# Patient Record
Sex: MALE | Race: WHITE | NOT HISPANIC OR LATINO
[De-identification: names, ages, dates, MRNs, and addresses within clinical notes are randomized per-mention and may not be internally consistent; named-entity substitution may affect disease eponyms.]

---

## 2017-02-21 ENCOUNTER — NON-APPOINTMENT (OUTPATIENT)
Age: 77
End: 2017-02-21

## 2017-02-21 ENCOUNTER — LABORATORY RESULT (OUTPATIENT)
Age: 77
End: 2017-02-21

## 2017-02-21 ENCOUNTER — APPOINTMENT (OUTPATIENT)
Dept: INTERNAL MEDICINE | Facility: CLINIC | Age: 77
End: 2017-02-21

## 2017-02-21 VITALS
HEIGHT: 70 IN | BODY MASS INDEX: 21.76 KG/M2 | SYSTOLIC BLOOD PRESSURE: 180 MMHG | HEART RATE: 62 BPM | WEIGHT: 152 LBS | DIASTOLIC BLOOD PRESSURE: 75 MMHG

## 2017-02-21 LAB
BILIRUB UR QL STRIP: NORMAL
BILIRUB UR QL STRIP: NORMAL
CLARITY UR: CLEAR
CLARITY UR: CLEAR
COLLECTION METHOD: NORMAL
COLLECTION METHOD: NORMAL
GLUCOSE UR-MCNC: NORMAL
GLUCOSE UR-MCNC: NORMAL
HCG UR QL: 0.2 EU/DL
HCG UR QL: 0.2 EU/DL
HGB UR QL STRIP.AUTO: NORMAL
HGB UR QL STRIP.AUTO: NORMAL
KETONES UR-MCNC: NORMAL
KETONES UR-MCNC: NORMAL
LEUKOCYTE ESTERASE UR QL STRIP: NORMAL
LEUKOCYTE ESTERASE UR QL STRIP: NORMAL
NITRITE UR QL STRIP: NORMAL
NITRITE UR QL STRIP: NORMAL
PH UR STRIP: 7
PH UR STRIP: 7
PROT UR STRIP-MCNC: NORMAL
PROT UR STRIP-MCNC: NORMAL
SP GR UR STRIP: 1.02
SP GR UR STRIP: 1.02

## 2017-02-21 RX ORDER — FINASTERIDE 5 MG/1
5 TABLET, FILM COATED ORAL
Qty: 90 | Refills: 0 | Status: ACTIVE | COMMUNITY
Start: 2016-05-19

## 2017-02-23 LAB
25(OH)D3 SERPL-MCNC: 34.4 NG/ML
ALBUMIN SERPL ELPH-MCNC: 3.9 G/DL
ALP BLD-CCNC: 88 U/L
ALT SERPL-CCNC: 23 U/L
ANION GAP SERPL CALC-SCNC: 13 MMOL/L
AST SERPL-CCNC: 26 U/L
BASOPHILS # BLD AUTO: 0.03 K/UL
BASOPHILS NFR BLD AUTO: 0.4 %
BILIRUB SERPL-MCNC: 0.4 MG/DL
BUN SERPL-MCNC: 20 MG/DL
CALCIUM SERPL-MCNC: 9.7 MG/DL
CHLORIDE SERPL-SCNC: 101 MMOL/L
CHOLEST SERPL-MCNC: 167 MG/DL
CHOLEST/HDLC SERPL: 3.3 RATIO
CO2 SERPL-SCNC: 25 MMOL/L
CREAT SERPL-MCNC: 1.21 MG/DL
EOSINOPHIL # BLD AUTO: 0.23 K/UL
EOSINOPHIL NFR BLD AUTO: 3.4 %
GLUCOSE SERPL-MCNC: 128 MG/DL
HBA1C MFR BLD HPLC: 5.5 %
HCT VFR BLD CALC: 43.4 %
HDLC SERPL-MCNC: 51 MG/DL
HGB BLD-MCNC: 14.7 G/DL
IMM GRANULOCYTES NFR BLD AUTO: 0.3 %
LDLC SERPL CALC-MCNC: 100 MG/DL
LYMPHOCYTES # BLD AUTO: 1.59 K/UL
LYMPHOCYTES NFR BLD AUTO: 23.3 %
MAN DIFF?: NORMAL
MCHC RBC-ENTMCNC: 32.7 PG
MCHC RBC-ENTMCNC: 33.9 GM/DL
MCV RBC AUTO: 96.7 FL
MONOCYTES # BLD AUTO: 0.83 K/UL
MONOCYTES NFR BLD AUTO: 12.2 %
NEUTROPHILS # BLD AUTO: 4.12 K/UL
NEUTROPHILS NFR BLD AUTO: 60.4 %
PLATELET # BLD AUTO: 220 K/UL
POTASSIUM SERPL-SCNC: 4.1 MMOL/L
PROT SERPL-MCNC: 6.9 G/DL
RBC # BLD: 4.49 M/UL
RBC # FLD: 13.3 %
SODIUM SERPL-SCNC: 139 MMOL/L
T3FREE SERPL-MCNC: 2.67 PG/ML
T4 FREE SERPL-MCNC: 1.3 NG/DL
TRIGL SERPL-MCNC: 78 MG/DL
TSH SERPL-ACNC: 2.31 UIU/ML
URATE SERPL-MCNC: 6.9 MG/DL
WBC # FLD AUTO: 6.82 K/UL

## 2017-05-08 ENCOUNTER — APPOINTMENT (OUTPATIENT)
Dept: INTERNAL MEDICINE | Facility: CLINIC | Age: 77
End: 2017-05-08

## 2017-05-08 ENCOUNTER — LABORATORY RESULT (OUTPATIENT)
Age: 77
End: 2017-05-08

## 2017-05-08 VITALS
DIASTOLIC BLOOD PRESSURE: 79 MMHG | SYSTOLIC BLOOD PRESSURE: 150 MMHG | HEIGHT: 68 IN | WEIGHT: 158 LBS | HEART RATE: 63 BPM | BODY MASS INDEX: 23.95 KG/M2

## 2017-05-08 LAB
FLUAV SPEC QL CULT: NEGATIVE
FLUBV AG SPEC QL IA: NEGATIVE

## 2017-05-12 LAB
25(OH)D3 SERPL-MCNC: 32.3 NG/ML
ALBUMIN SERPL ELPH-MCNC: 3.9 G/DL
ALP BLD-CCNC: 128 U/L
ALT SERPL-CCNC: 112 U/L
ANION GAP SERPL CALC-SCNC: 16 MMOL/L
AST SERPL-CCNC: 89 U/L
BASOPHILS # BLD AUTO: 0.03 K/UL
BASOPHILS NFR BLD AUTO: 0.3 %
BILIRUB SERPL-MCNC: 0.7 MG/DL
BUN SERPL-MCNC: 25 MG/DL
CALCIUM SERPL-MCNC: 9.8 MG/DL
CHLORIDE SERPL-SCNC: 95 MMOL/L
CO2 SERPL-SCNC: 25 MMOL/L
CREAT SERPL-MCNC: 1.11 MG/DL
EOSINOPHIL # BLD AUTO: 0.04 K/UL
EOSINOPHIL NFR BLD AUTO: 0.4 %
GLUCOSE SERPL-MCNC: 93 MG/DL
HCT VFR BLD CALC: 46.3 %
HETEROPH AB SER QL: NEGATIVE
HGB BLD-MCNC: 15 G/DL
IMM GRANULOCYTES NFR BLD AUTO: 0.3 %
LYMPHOCYTES # BLD AUTO: 0.51 K/UL
LYMPHOCYTES NFR BLD AUTO: 5 %
MAN DIFF?: NORMAL
MCHC RBC-ENTMCNC: 31.4 PG
MCHC RBC-ENTMCNC: 32.4 GM/DL
MCV RBC AUTO: 96.9 FL
MONOCYTES # BLD AUTO: 0.85 K/UL
MONOCYTES NFR BLD AUTO: 8.4 %
NEUTROPHILS # BLD AUTO: 8.66 K/UL
NEUTROPHILS NFR BLD AUTO: 85.6 %
PLATELET # BLD AUTO: 187 K/UL
POTASSIUM SERPL-SCNC: 4.2 MMOL/L
PROT SERPL-MCNC: 7.7 G/DL
RBC # BLD: 4.78 M/UL
RBC # FLD: 13.8 %
SODIUM SERPL-SCNC: 136 MMOL/L
VIT B12 SERPL-MCNC: 475 PG/ML
WBC # FLD AUTO: 10.12 K/UL

## 2017-05-15 ENCOUNTER — LABORATORY RESULT (OUTPATIENT)
Age: 77
End: 2017-05-15

## 2017-05-15 ENCOUNTER — APPOINTMENT (OUTPATIENT)
Dept: INTERNAL MEDICINE | Facility: CLINIC | Age: 77
End: 2017-05-15

## 2017-05-16 LAB
ALBUMIN SERPL ELPH-MCNC: 3.8 G/DL
ALP BLD-CCNC: 184 U/L
ALT SERPL-CCNC: 109 U/L
ANION GAP SERPL CALC-SCNC: 13 MMOL/L
AST SERPL-CCNC: 55 U/L
BILIRUB SERPL-MCNC: 0.3 MG/DL
BUN SERPL-MCNC: 21 MG/DL
CALCIUM SERPL-MCNC: 9.9 MG/DL
CHLORIDE SERPL-SCNC: 101 MMOL/L
CO2 SERPL-SCNC: 25 MMOL/L
CREAT SERPL-MCNC: 1.15 MG/DL
GLUCOSE SERPL-MCNC: 81 MG/DL
POTASSIUM SERPL-SCNC: 4.2 MMOL/L
PROT SERPL-MCNC: 7.1 G/DL
SODIUM SERPL-SCNC: 139 MMOL/L

## 2017-05-21 ENCOUNTER — FORM ENCOUNTER (OUTPATIENT)
Age: 77
End: 2017-05-21

## 2017-05-22 ENCOUNTER — OUTPATIENT (OUTPATIENT)
Dept: OUTPATIENT SERVICES | Facility: HOSPITAL | Age: 77
LOS: 1 days | End: 2017-05-22
Payer: MEDICARE

## 2017-05-22 ENCOUNTER — APPOINTMENT (OUTPATIENT)
Dept: ULTRASOUND IMAGING | Facility: CLINIC | Age: 77
End: 2017-05-22

## 2017-05-22 DIAGNOSIS — R74.8 ABNORMAL LEVELS OF OTHER SERUM ENZYMES: ICD-10-CM

## 2017-05-22 DIAGNOSIS — R68.83 CHILLS (WITHOUT FEVER): ICD-10-CM

## 2017-05-22 PROCEDURE — 76700 US EXAM ABDOM COMPLETE: CPT

## 2017-06-26 ENCOUNTER — LABORATORY RESULT (OUTPATIENT)
Age: 77
End: 2017-06-26

## 2017-06-26 ENCOUNTER — APPOINTMENT (OUTPATIENT)
Dept: INTERNAL MEDICINE | Facility: CLINIC | Age: 77
End: 2017-06-26

## 2017-06-26 LAB
ALP BLD-CCNC: 94 U/L
ALT SERPL-CCNC: 17 U/L
AST SERPL-CCNC: 21 U/L

## 2018-01-19 ENCOUNTER — APPOINTMENT (OUTPATIENT)
Dept: INTERNAL MEDICINE | Facility: CLINIC | Age: 78
End: 2018-01-19
Payer: MEDICARE

## 2018-01-19 PROCEDURE — 90686 IIV4 VACC NO PRSV 0.5 ML IM: CPT

## 2018-01-19 PROCEDURE — G0008: CPT

## 2018-07-16 ENCOUNTER — APPOINTMENT (OUTPATIENT)
Dept: INTERNAL MEDICINE | Facility: CLINIC | Age: 78
End: 2018-07-16
Payer: MEDICARE

## 2018-07-16 ENCOUNTER — NON-APPOINTMENT (OUTPATIENT)
Age: 78
End: 2018-07-16

## 2018-07-16 ENCOUNTER — RESULT CHARGE (OUTPATIENT)
Age: 78
End: 2018-07-16

## 2018-07-16 VITALS
WEIGHT: 166 LBS | DIASTOLIC BLOOD PRESSURE: 78 MMHG | SYSTOLIC BLOOD PRESSURE: 168 MMHG | HEART RATE: 60 BPM | HEIGHT: 68 IN | BODY MASS INDEX: 25.16 KG/M2

## 2018-07-16 DIAGNOSIS — R68.83 CHILLS (WITHOUT FEVER): ICD-10-CM

## 2018-07-16 LAB
BILIRUB UR QL STRIP: NEGATIVE
CLARITY UR: CLEAR
COLLECTION METHOD: NORMAL
GLUCOSE UR-MCNC: NEGATIVE
HCG UR QL: 0.2 EU/DL
HGB UR QL STRIP.AUTO: NEGATIVE
KETONES UR-MCNC: NEGATIVE
LEUKOCYTE ESTERASE UR QL STRIP: NEGATIVE
NITRITE UR QL STRIP: NEGATIVE
PH UR STRIP: 6.5
PROT UR STRIP-MCNC: NEGATIVE
SP GR UR STRIP: 1.02

## 2018-07-16 PROCEDURE — 90670 PCV13 VACCINE IM: CPT

## 2018-07-16 PROCEDURE — G0009: CPT

## 2018-07-16 PROCEDURE — G0439: CPT | Mod: 25

## 2018-07-16 PROCEDURE — 36415 COLL VENOUS BLD VENIPUNCTURE: CPT

## 2018-07-16 PROCEDURE — 93000 ELECTROCARDIOGRAM COMPLETE: CPT

## 2018-07-16 NOTE — PLAN
[FreeTextEntry1] : Colonoscopy Stressed.\par Dr. Parada's name provided.\par \par Dr. Lisker's name given for Cardiology evaluation.

## 2018-07-16 NOTE — HISTORY OF PRESENT ILLNESS
[de-identified] : This is annual Medicare Preventative examination for this 78 year year old White male.  The patient has been generally feeling well with no new complaints except: trauma to left middle finger.. A complete evaluation of their current medication was reviewed with them including OTC medication .\par \par Chronic medical problems for which they are being followed by a physician are:\par Hypertension\par Elevated PSA\par Glaucoma\par \par \par    \par Colonoscopist:    Last done-\par \par Exercises regularly:\par \par A complete Review of Systems is below but it is noteworthy to mention that this patient denies Chest Pain, Dyspnea on Exertion, Palpitations, urinary problems, rectal bleeding or Gastrointestinal problems at this time.\par \par \par

## 2018-07-16 NOTE — HEALTH RISK ASSESSMENT
[One fall no injury in past year] : Patient reported one fall in the past year without injury [0] : 2) Feeling down, depressed, or hopeless: Not at all (0) [Employed] : employed [] :  [Discussed at today's visit] : Advance Directives Discussed at today's visit [] : No [[id number]] : 0 [Reports changes in hearing] : Reports no changes in hearing [Reports changes in vision] : Reports no changes in vision [Reports changes in dental health] : Reports no changes in dental health [FreeTextEntry4] : Wife is HCP.

## 2018-07-16 NOTE — ASSESSMENT
[FreeTextEntry1] : This is a 78 year -old  M who was examined today for an annual preventative physical.  A complete history and physical examination were performed.  The patient seems to follow a healthy lifestyle in that he  follows a good diet and exercises regularly.  \par \par Physical examination was entirely within normal limits , including a normal blood pressure and pulse.  \par \par Cognitive Issues  _X__No   ___Yes\par Fall Risk                _X__No   ___Yes\par \par The following recommendations were made:\par Exercise regularly.\par Maintain a healthy diet.\par Patient promised to have a colonoscopy.  Told to see Dr. Parada.\par \par \par

## 2018-07-17 LAB
25(OH)D3 SERPL-MCNC: 37.2 NG/ML
ALBUMIN SERPL ELPH-MCNC: 4.2 G/DL
ALP BLD-CCNC: 94 U/L
ALT SERPL-CCNC: 33 U/L
ANION GAP SERPL CALC-SCNC: 14 MMOL/L
AST SERPL-CCNC: 31 U/L
BASOPHILS # BLD AUTO: 0.02 K/UL
BASOPHILS NFR BLD AUTO: 0.3 %
BILIRUB SERPL-MCNC: 0.4 MG/DL
BUN SERPL-MCNC: 19 MG/DL
CALCIUM SERPL-MCNC: 9.9 MG/DL
CHLORIDE SERPL-SCNC: 104 MMOL/L
CHOLEST SERPL-MCNC: 183 MG/DL
CHOLEST/HDLC SERPL: 4 RATIO
CO2 SERPL-SCNC: 25 MMOL/L
CREAT SERPL-MCNC: 1.23 MG/DL
EOSINOPHIL # BLD AUTO: 0.2 K/UL
EOSINOPHIL NFR BLD AUTO: 3.1 %
GLUCOSE SERPL-MCNC: 72 MG/DL
HBA1C MFR BLD HPLC: 5.1 %
HCT VFR BLD CALC: 45.9 %
HDLC SERPL-MCNC: 46 MG/DL
HGB BLD-MCNC: 14.7 G/DL
IMM GRANULOCYTES NFR BLD AUTO: 0.3 %
LDLC SERPL CALC-MCNC: 109 MG/DL
LYMPHOCYTES # BLD AUTO: 1.42 K/UL
LYMPHOCYTES NFR BLD AUTO: 21.9 %
MAN DIFF?: NORMAL
MCHC RBC-ENTMCNC: 32 GM/DL
MCHC RBC-ENTMCNC: 32 PG
MCV RBC AUTO: 99.8 FL
MONOCYTES # BLD AUTO: 0.81 K/UL
MONOCYTES NFR BLD AUTO: 12.5 %
NEUTROPHILS # BLD AUTO: 4.01 K/UL
NEUTROPHILS NFR BLD AUTO: 61.9 %
PLATELET # BLD AUTO: 220 K/UL
POTASSIUM SERPL-SCNC: 4.3 MMOL/L
PROT SERPL-MCNC: 7.4 G/DL
RBC # BLD: 4.6 M/UL
RBC # FLD: 14 %
SAVE SPECIMEN: NORMAL
SODIUM SERPL-SCNC: 143 MMOL/L
T4 SERPL-MCNC: 7.1 UG/DL
TRIGL SERPL-MCNC: 140 MG/DL
TSH SERPL-ACNC: 3.49 UIU/ML
URATE SERPL-MCNC: 7.2 MG/DL
WBC # FLD AUTO: 6.48 K/UL

## 2018-11-14 ENCOUNTER — OTHER (OUTPATIENT)
Age: 78
End: 2018-11-14

## 2018-11-27 ENCOUNTER — OTHER (OUTPATIENT)
Age: 78
End: 2018-11-27

## 2018-12-05 ENCOUNTER — APPOINTMENT (OUTPATIENT)
Dept: ORTHOPEDIC SURGERY | Facility: CLINIC | Age: 78
End: 2018-12-05
Payer: MEDICARE

## 2018-12-05 VITALS
SYSTOLIC BLOOD PRESSURE: 164 MMHG | WEIGHT: 156 LBS | HEIGHT: 70 IN | DIASTOLIC BLOOD PRESSURE: 75 MMHG | BODY MASS INDEX: 22.33 KG/M2 | HEART RATE: 55 BPM

## 2018-12-05 PROCEDURE — 73130 X-RAY EXAM OF HAND: CPT | Mod: 26,RT

## 2018-12-05 PROCEDURE — 99203 OFFICE O/P NEW LOW 30 MIN: CPT

## 2018-12-05 RX ORDER — FLUTICASONE PROPIONATE 50 UG/1
50 SPRAY, METERED NASAL TWICE DAILY
Qty: 1 | Refills: 1 | Status: DISCONTINUED | COMMUNITY
Start: 2017-05-08 | End: 2018-12-05

## 2019-01-03 PROBLEM — S63.659D SAGITTAL BAND RUPTURE AT METACARPOPHALANGEAL JOINT, SUBSEQUENT ENCOUNTER: Status: ACTIVE | Noted: 2019-01-03

## 2019-01-04 ENCOUNTER — APPOINTMENT (OUTPATIENT)
Dept: ORTHOPEDIC SURGERY | Facility: CLINIC | Age: 79
End: 2019-01-04
Payer: MEDICARE

## 2019-01-04 VITALS
SYSTOLIC BLOOD PRESSURE: 177 MMHG | WEIGHT: 156 LBS | BODY MASS INDEX: 22.33 KG/M2 | HEART RATE: 62 BPM | DIASTOLIC BLOOD PRESSURE: 75 MMHG | HEIGHT: 70 IN

## 2019-01-04 DIAGNOSIS — S63.659D SPRAIN OF METACARPOPHALANGEAL JOINT OF UNSPECIFIED FINGER, SUBSEQUENT ENCOUNTER: ICD-10-CM

## 2019-01-04 PROCEDURE — 99213 OFFICE O/P EST LOW 20 MIN: CPT

## 2019-01-22 ENCOUNTER — RX RENEWAL (OUTPATIENT)
Age: 79
End: 2019-01-22

## 2019-03-11 ENCOUNTER — OUTPATIENT (OUTPATIENT)
Dept: OUTPATIENT SERVICES | Facility: HOSPITAL | Age: 79
LOS: 1 days | End: 2019-03-11
Payer: MEDICARE

## 2019-03-11 ENCOUNTER — APPOINTMENT (OUTPATIENT)
Dept: RADIOLOGY | Facility: CLINIC | Age: 79
End: 2019-03-11
Payer: MEDICARE

## 2019-03-11 DIAGNOSIS — Z87.442 PERSONAL HISTORY OF URINARY CALCULI: ICD-10-CM

## 2019-03-11 PROCEDURE — 74018 RADEX ABDOMEN 1 VIEW: CPT | Mod: 26

## 2019-03-11 PROCEDURE — 74018 RADEX ABDOMEN 1 VIEW: CPT

## 2019-04-01 ENCOUNTER — RX RENEWAL (OUTPATIENT)
Age: 79
End: 2019-04-01

## 2019-04-11 ENCOUNTER — LABORATORY RESULT (OUTPATIENT)
Age: 79
End: 2019-04-11

## 2019-04-11 ENCOUNTER — APPOINTMENT (OUTPATIENT)
Dept: CARDIOLOGY | Facility: CLINIC | Age: 79
End: 2019-04-11
Payer: MEDICARE

## 2019-04-11 ENCOUNTER — NON-APPOINTMENT (OUTPATIENT)
Age: 79
End: 2019-04-11

## 2019-04-11 ENCOUNTER — APPOINTMENT (OUTPATIENT)
Dept: PULMONOLOGY | Facility: CLINIC | Age: 79
End: 2019-04-11
Payer: MEDICARE

## 2019-04-11 VITALS
HEART RATE: 70 BPM | BODY MASS INDEX: 21.76 KG/M2 | WEIGHT: 152 LBS | TEMPERATURE: 98 F | SYSTOLIC BLOOD PRESSURE: 152 MMHG | DIASTOLIC BLOOD PRESSURE: 60 MMHG | HEIGHT: 70 IN | OXYGEN SATURATION: 99 %

## 2019-04-11 DIAGNOSIS — R42 DIZZINESS AND GIDDINESS: ICD-10-CM

## 2019-04-11 DIAGNOSIS — K21.0 GASTRO-ESOPHAGEAL REFLUX DISEASE WITH ESOPHAGITIS: ICD-10-CM

## 2019-04-11 DIAGNOSIS — R49.9 UNSPECIFIED VOICE AND RESONANCE DISORDER: ICD-10-CM

## 2019-04-11 DIAGNOSIS — R74.0 NONSPECIFIC ELEVATION OF LEVELS OF TRANSAMINASE AND LACTIC ACID DEHYDROGENASE [LDH]: ICD-10-CM

## 2019-04-11 DIAGNOSIS — R25.1 TREMOR, UNSPECIFIED: ICD-10-CM

## 2019-04-11 DIAGNOSIS — H40.9 UNSPECIFIED GLAUCOMA: ICD-10-CM

## 2019-04-11 PROCEDURE — 99204 OFFICE O/P NEW MOD 45 MIN: CPT

## 2019-04-11 PROCEDURE — 93000 ELECTROCARDIOGRAM COMPLETE: CPT

## 2019-04-11 PROCEDURE — 93224 XTRNL ECG REC UP TO 48 HRS: CPT

## 2019-04-11 PROCEDURE — 71046 X-RAY EXAM CHEST 2 VIEWS: CPT

## 2019-04-11 PROCEDURE — 99214 OFFICE O/P EST MOD 30 MIN: CPT | Mod: 25

## 2019-04-11 PROCEDURE — 93306 TTE W/DOPPLER COMPLETE: CPT

## 2019-04-11 RX ORDER — BIMATOPROST 0.1 MG/ML
SOLUTION/ DROPS OPHTHALMIC
Refills: 0 | Status: ACTIVE | COMMUNITY

## 2019-04-11 RX ORDER — MECLIZINE HYDROCHLORIDE 25 MG/1
25 TABLET ORAL
Refills: 0 | Status: DISCONTINUED | COMMUNITY

## 2019-04-11 NOTE — HISTORY OF PRESENT ILLNESS
[FreeTextEntry1] : lightheaded, palpitations, skipped beat feeling, racy heart, mild COSTA\par typically (gym) exercises 2 times per week but recently feels fatigues and mildly short breath with exercises.\par denies headache, dizziness, visual disturbances, chest pain, N/V, dipahoresis, orthopnea, paroxysmal nocturnal dyspnea.   pt with increased tremors

## 2019-04-11 NOTE — PHYSICAL EXAM
[General Appearance - Well Developed] : well developed [Normal Appearance] : normal appearance [General Appearance - Well Nourished] : well nourished [Well Groomed] : well groomed [General Appearance - In No Acute Distress] : no acute distress [No Deformities] : no deformities [Normal Oral Mucosa] : normal oral mucosa [Eyelids - No Xanthelasma] : the eyelids demonstrated no xanthelasmas [Normal Conjunctiva] : the conjunctiva exhibited no abnormalities [No Oral Cyanosis] : no oral cyanosis [No Oral Pallor] : no oral pallor [Normal Jugular Venous A Waves Present] : normal jugular venous A waves present [Normal Jugular Venous V Waves Present] : normal jugular venous V waves present [No Jugular Venous Jones A Waves] : no jugular venous jones A waves [Heart Rate And Rhythm] : heart rate and rhythm were normal [Heart Sounds] : normal S1 and S2 [Murmurs] : no murmurs present [Respiration, Rhythm And Depth] : normal respiratory rhythm and effort [Abdomen Soft] : soft [Auscultation Breath Sounds / Voice Sounds] : lungs were clear to auscultation bilaterally [Exaggerated Use Of Accessory Muscles For Inspiration] : no accessory muscle use [Abdomen Mass (___ Cm)] : no abdominal mass palpated [Abdomen Tenderness] : non-tender [Abnormal Walk] : normal gait [Gait - Sufficient For Exercise Testing] : the gait was sufficient for exercise testing [Petechial Hemorrhages (___cm)] : no petechial hemorrhages [Nail Clubbing] : no clubbing of the fingernails [Cyanosis, Localized] : no localized cyanosis [] : no ischemic changes [Mood] : the mood was normal [Oriented To Time, Place, And Person] : oriented to person, place, and time [Affect] : the affect was normal [No Anxiety] : not feeling anxious [FreeTextEntry1] : left side (ear) area excoriation/skin growth right hand

## 2019-04-12 ENCOUNTER — APPOINTMENT (OUTPATIENT)
Dept: CARDIOLOGY | Facility: CLINIC | Age: 79
End: 2019-04-12

## 2019-04-14 LAB
25(OH)D3 SERPL-MCNC: 35.3 NG/ML
ALBUMIN SERPL ELPH-MCNC: 4.3 G/DL
ALP BLD-CCNC: 107 U/L
ALT SERPL-CCNC: 27 U/L
ANION GAP SERPL CALC-SCNC: 14 MMOL/L
AST SERPL-CCNC: 16 U/L
BASOPHILS # BLD AUTO: 0.06 K/UL
BASOPHILS NFR BLD AUTO: 0.8 %
BILIRUB SERPL-MCNC: 0.3 MG/DL
BUN SERPL-MCNC: 28 MG/DL
CALCIUM SERPL-MCNC: 10 MG/DL
CHLORIDE SERPL-SCNC: 103 MMOL/L
CHOLEST SERPL-MCNC: 183 MG/DL
CHOLEST/HDLC SERPL: 3.5 RATIO
CO2 SERPL-SCNC: 24 MMOL/L
CREAT SERPL-MCNC: 1.14 MG/DL
EOSINOPHIL # BLD AUTO: 0.2 K/UL
EOSINOPHIL NFR BLD AUTO: 2.6 %
ESTIMATED AVERAGE GLUCOSE: 111 MG/DL
FERRITIN SERPL-MCNC: 433 NG/ML
FOLATE SERPL-MCNC: 18.4 NG/ML
GLUCOSE SERPL-MCNC: 87 MG/DL
HAPTOGLOB SERPL-MCNC: 89 MG/DL
HBA1C MFR BLD HPLC: 5.5 %
HCT VFR BLD CALC: 47.9 %
HDLC SERPL-MCNC: 52 MG/DL
HGB BLD-MCNC: 15.3 G/DL
IMM GRANULOCYTES NFR BLD AUTO: 0.4 %
IRON SERPL-MCNC: 96 UG/DL
LDH SERPL-CCNC: 145 U/L
LDLC SERPL CALC-MCNC: 108 MG/DL
LYMPHOCYTES # BLD AUTO: 1.42 K/UL
LYMPHOCYTES NFR BLD AUTO: 18.5 %
MAGNESIUM SERPL-MCNC: 2.2 MG/DL
MAN DIFF?: NORMAL
MCHC RBC-ENTMCNC: 31.8 PG
MCHC RBC-ENTMCNC: 31.9 GM/DL
MCV RBC AUTO: 99.6 FL
MONOCYTES # BLD AUTO: 0.91 K/UL
MONOCYTES NFR BLD AUTO: 11.9 %
NEUTROPHILS # BLD AUTO: 5.05 K/UL
NEUTROPHILS NFR BLD AUTO: 65.8 %
PHOSPHATE SERPL-MCNC: 2.9 MG/DL
PLATELET # BLD AUTO: 232 K/UL
POTASSIUM SERPL-SCNC: 4.5 MMOL/L
PROT SERPL-MCNC: 7 G/DL
RBC # BLD: 4.81 M/UL
RBC # FLD: 13.2 %
SODIUM SERPL-SCNC: 141 MMOL/L
T3RU NFR SERPL: 1 TBI
T4 FREE SERPL-MCNC: 1.3 NG/DL
T4 SERPL-MCNC: 7.1 UG/DL
TRANSFERRIN SERPL-MCNC: 217 MG/DL
TRIGL SERPL-MCNC: 116 MG/DL
TSH SERPL-ACNC: 2.99 UIU/ML
URATE SERPL-MCNC: 7.4 MG/DL
VIT B12 SERPL-MCNC: 507 PG/ML
WBC # FLD AUTO: 7.67 K/UL

## 2019-04-18 ENCOUNTER — NON-APPOINTMENT (OUTPATIENT)
Age: 79
End: 2019-04-18

## 2019-04-22 ENCOUNTER — APPOINTMENT (OUTPATIENT)
Dept: CARDIOLOGY | Facility: CLINIC | Age: 79
End: 2019-04-22

## 2019-04-22 DIAGNOSIS — M72.2 PLANTAR FASCIAL FIBROMATOSIS: ICD-10-CM

## 2019-04-23 ENCOUNTER — APPOINTMENT (OUTPATIENT)
Dept: CARDIOLOGY | Facility: CLINIC | Age: 79
End: 2019-04-23
Payer: MEDICARE

## 2019-04-23 PROCEDURE — A9500: CPT

## 2019-04-23 PROCEDURE — 93015 CV STRESS TEST SUPVJ I&R: CPT

## 2019-04-23 PROCEDURE — 78452 HT MUSCLE IMAGE SPECT MULT: CPT

## 2019-04-25 ENCOUNTER — APPOINTMENT (OUTPATIENT)
Dept: CARDIOLOGY | Facility: CLINIC | Age: 79
End: 2019-04-25

## 2019-05-07 ENCOUNTER — TRANSCRIPTION ENCOUNTER (OUTPATIENT)
Age: 79
End: 2019-05-07

## 2019-05-14 DIAGNOSIS — R79.89 OTHER SPECIFIED ABNORMAL FINDINGS OF BLOOD CHEMISTRY: ICD-10-CM

## 2019-05-17 ENCOUNTER — RESULT REVIEW (OUTPATIENT)
Age: 79
End: 2019-05-17

## 2019-05-18 LAB
BASOPHILS # BLD AUTO: 0.04 K/UL
BASOPHILS NFR BLD AUTO: 0.6 %
EOSINOPHIL # BLD AUTO: 0.26 K/UL
EOSINOPHIL NFR BLD AUTO: 3.7 %
FERRITIN SERPL-MCNC: 409 NG/ML
HCT VFR BLD CALC: 48.7 %
HGB BLD-MCNC: 15.4 G/DL
IMM GRANULOCYTES NFR BLD AUTO: 0.4 %
LYMPHOCYTES # BLD AUTO: 1.59 K/UL
LYMPHOCYTES NFR BLD AUTO: 22.6 %
MAN DIFF?: NORMAL
MCHC RBC-ENTMCNC: 31.6 GM/DL
MCHC RBC-ENTMCNC: 31.6 PG
MCV RBC AUTO: 100 FL
MONOCYTES # BLD AUTO: 0.78 K/UL
MONOCYTES NFR BLD AUTO: 11.1 %
NEUTROPHILS # BLD AUTO: 4.34 K/UL
NEUTROPHILS NFR BLD AUTO: 61.6 %
PLATELET # BLD AUTO: 226 K/UL
RBC # BLD: 4.87 M/UL
RBC # FLD: 13.2 %
WBC # FLD AUTO: 7.04 K/UL

## 2019-05-28 ENCOUNTER — APPOINTMENT (OUTPATIENT)
Dept: PULMONOLOGY | Facility: CLINIC | Age: 79
End: 2019-05-28
Payer: MEDICARE

## 2019-05-28 VITALS
SYSTOLIC BLOOD PRESSURE: 186 MMHG | RESPIRATION RATE: 14 BRPM | HEART RATE: 52 BPM | HEIGHT: 70 IN | BODY MASS INDEX: 22.19 KG/M2 | DIASTOLIC BLOOD PRESSURE: 69 MMHG | WEIGHT: 155 LBS | OXYGEN SATURATION: 99 %

## 2019-05-28 VITALS — DIASTOLIC BLOOD PRESSURE: 80 MMHG | SYSTOLIC BLOOD PRESSURE: 150 MMHG

## 2019-05-28 DIAGNOSIS — Z87.891 PERSONAL HISTORY OF NICOTINE DEPENDENCE: ICD-10-CM

## 2019-05-28 DIAGNOSIS — Z77.22 CONTACT WITH AND (SUSPECTED) EXPOSURE TO ENVIRONMENTAL TOBACCO SMOKE (ACUTE) (CHRONIC): ICD-10-CM

## 2019-05-28 PROCEDURE — 94664 DEMO&/EVAL PT USE INHALER: CPT | Mod: 59

## 2019-05-28 PROCEDURE — 94726 PLETHYSMOGRAPHY LUNG VOLUMES: CPT

## 2019-05-28 PROCEDURE — 94729 DIFFUSING CAPACITY: CPT

## 2019-05-28 PROCEDURE — 99204 OFFICE O/P NEW MOD 45 MIN: CPT | Mod: 25

## 2019-05-28 PROCEDURE — 94060 EVALUATION OF WHEEZING: CPT

## 2019-05-28 PROCEDURE — 94750: CPT

## 2019-05-28 NOTE — PROCEDURE
[FreeTextEntry1] : Pulmonary function testing.\par FEV1, FVC, and FEV1/FVC are within normal limits. There was not a significant response to inhaled bronchodilator. TLC and subdivisions are normal. RV/TLC ratio is normal. Resistance and specific conductance are normal. Single breath diffusion capacity is normal.

## 2019-05-28 NOTE — ASSESSMENT
[FreeTextEntry1] : No evidence of significant pulmonary pathology or COPD\par Appearance of hyperinflation on chest radiograph likely secondary to body habitus.\par \par No indication for further workup or treatment. \par

## 2019-05-28 NOTE — PHYSICAL EXAM
[Normal Conjunctiva] : the conjunctiva exhibited no abnormalities [General Appearance - Well Developed] : well developed [Normal Oropharynx] : normal oropharynx [Murmurs] : no murmurs present [Heart Sounds] : normal S1 and S2 [Lungs Percussion] : the lungs were normal to percussion [Abdomen Soft] : soft [Auscultation Breath Sounds / Voice Sounds] : lungs were clear to auscultation bilaterally [] : no hepato-splenomegaly [Nail Clubbing] : no clubbing of the fingernails [Abdomen Tenderness] : non-tender [Cyanosis, Localized] : no localized cyanosis

## 2019-05-28 NOTE — HISTORY OF PRESENT ILLNESS
[FreeTextEntry1] : NICOLE CALDERÓN is a 79 year old  M referred for pulmonary evaluation for abnormal chest radiograph and COSTA.\par \par \par Had CXR told abnormal.\par \par had cardiac workup negative.\par Difficulty with SOB. no significant cough, wheezing or chest congestion.\par States SOB most of life. Feels ? related to anxiety. \par Does feel SOB progressive past few years. \par \par Past pulmonary history. N\par Occupational Exposure.\par Family history of pulmonary disease.\par Recent travel\par Pets\par

## 2019-07-30 ENCOUNTER — APPOINTMENT (OUTPATIENT)
Dept: INTERNAL MEDICINE | Facility: CLINIC | Age: 79
End: 2019-07-30
Payer: MEDICARE

## 2019-07-30 VITALS
HEART RATE: 60 BPM | BODY MASS INDEX: 22.19 KG/M2 | DIASTOLIC BLOOD PRESSURE: 54 MMHG | WEIGHT: 155 LBS | SYSTOLIC BLOOD PRESSURE: 106 MMHG | HEIGHT: 70 IN

## 2019-07-30 PROCEDURE — G0439: CPT

## 2019-07-30 NOTE — HISTORY OF PRESENT ILLNESS
[de-identified] : This is annual Preventative examination for this 79 year year old White male.  The patient has been generally feeling well with no new complaints except Shortness of breath which was worked . A complete evaluation of their current medication was reviewed with them including OTC medication .\par \par Chronic medical problems for which they are being followed by a physician are:\par BPH\par    \par Colonoscopist:    Last done-\par \par Exercises regularly:\par \par A complete Review of Systems is below but it is noteworthy to mention that this patient denies Chest Pain, Dyspnea on Exertion, Palpitations, urinary problems, rectal bleeding or Gastrointestinal problems at this time.\par \par \par

## 2019-07-30 NOTE — HEALTH RISK ASSESSMENT
[Yes] : Yes [No falls in past year] : Patient reported no falls in the past year [0] : 2) Feeling down, depressed, or hopeless: Not at all (0) [de-identified] : Socially

## 2019-07-30 NOTE — ASSESSMENT
[FreeTextEntry1] : This is a 79 year -old  M who was examined today for an annual preventative physical.  A complete history and physical examination were performed.  The patient seems to follow a healthy lifestyle in that he  follows a good diet and exercises regularly.  \par \par Physical examination was entirely within normal limits , including a normal blood pressure and pulse.  \par \par Cognitive Issues  _X__No   ___Yes\par Fall Risk                __X_No   ___Yes\par \par The following recommendations were made:\par Exercise regularly.\par Maintain a healthy diet.\par \par \par

## 2019-07-30 NOTE — PHYSICAL EXAM
[No Acute Distress] : no acute distress [Well Developed] : well developed [Well Nourished] : well nourished [PERRL] : pupils equal round and reactive to light [Well-Appearing] : well-appearing [Normal Sclera/Conjunctiva] : normal sclera/conjunctiva [EOMI] : extraocular movements intact [Normal Outer Ear/Nose] : the outer ears and nose were normal in appearance [Normal Oropharynx] : the oropharynx was normal [No JVD] : no jugular venous distention [No Lymphadenopathy] : no lymphadenopathy [Supple] : supple [Thyroid Normal, No Nodules] : the thyroid was normal and there were no nodules present [No Accessory Muscle Use] : no accessory muscle use [No Respiratory Distress] : no respiratory distress  [Clear to Auscultation] : lungs were clear to auscultation bilaterally [Normal Rate] : normal rate  [Regular Rhythm] : with a regular rhythm [No Carotid Bruits] : no carotid bruits [Normal S1, S2] : normal S1 and S2 [No Murmur] : no murmur heard [No Abdominal Bruit] : a ~M bruit was not heard ~T in the abdomen [No Varicosities] : no varicosities [No Palpable Aorta] : no palpable aorta [No Edema] : there was no peripheral edema [Pedal Pulses Present] : the pedal pulses are present [Soft] : abdomen soft [No Extremity Clubbing/Cyanosis] : no extremity clubbing/cyanosis [Non-distended] : non-distended [No Masses] : no abdominal mass palpated [Non Tender] : non-tender [Normal Posterior Cervical Nodes] : no posterior cervical lymphadenopathy [Normal Bowel Sounds] : normal bowel sounds [No HSM] : no HSM [No CVA Tenderness] : no CVA  tenderness [Normal Anterior Cervical Nodes] : no anterior cervical lymphadenopathy [No Spinal Tenderness] : no spinal tenderness [No Joint Swelling] : no joint swelling [No Rash] : no rash [Grossly Normal Strength/Tone] : grossly normal strength/tone [Coordination Grossly Intact] : coordination grossly intact [Normal Gait] : normal gait [No Focal Deficits] : no focal deficits [Normal Affect] : the affect was normal [Deep Tendon Reflexes (DTR)] : deep tendon reflexes were 2+ and symmetric [Normal Insight/Judgement] : insight and judgment were intact [de-identified] : Large Sebaceous Cysts on right arms

## 2019-12-17 ENCOUNTER — APPOINTMENT (OUTPATIENT)
Dept: INTERNAL MEDICINE | Facility: CLINIC | Age: 79
End: 2019-12-17
Payer: MEDICARE

## 2019-12-17 PROCEDURE — 90662 IIV NO PRSV INCREASED AG IM: CPT

## 2019-12-17 PROCEDURE — G0008: CPT

## 2019-12-26 ENCOUNTER — APPOINTMENT (OUTPATIENT)
Dept: INTERNAL MEDICINE | Facility: CLINIC | Age: 79
End: 2019-12-26
Payer: MEDICARE

## 2019-12-26 VITALS
WEIGHT: 152 LBS | DIASTOLIC BLOOD PRESSURE: 81 MMHG | HEIGHT: 70 IN | BODY MASS INDEX: 21.76 KG/M2 | HEART RATE: 65 BPM | SYSTOLIC BLOOD PRESSURE: 184 MMHG

## 2019-12-26 PROCEDURE — 99213 OFFICE O/P EST LOW 20 MIN: CPT

## 2019-12-26 NOTE — HISTORY OF PRESENT ILLNESS
[FreeTextEntry8] : \par 79 year old male here today with c/o an infected cyst on his band. Went back to Derm after having a cyst removed on Saturday and was then put on abx. He feels the infection might be getting worse again. \par He is on Doxycycline and bactroban cream. \par

## 2019-12-26 NOTE — PHYSICAL EXAM
[Normal] : affect was normal and insight and judgment were intact [de-identified] : red warm raised abcess on right wrist, healed over scab

## 2019-12-26 NOTE — ASSESSMENT
[FreeTextEntry1] : Abcess: recommend seeing DERM for I and D \par continue given meds\par \par pt states hes going there now

## 2020-01-14 ENCOUNTER — RX RENEWAL (OUTPATIENT)
Age: 80
End: 2020-01-14

## 2020-05-01 ENCOUNTER — RX RENEWAL (OUTPATIENT)
Age: 80
End: 2020-05-01

## 2020-09-22 ENCOUNTER — NON-APPOINTMENT (OUTPATIENT)
Age: 80
End: 2020-09-22

## 2020-09-22 ENCOUNTER — APPOINTMENT (OUTPATIENT)
Dept: INTERNAL MEDICINE | Facility: CLINIC | Age: 80
End: 2020-09-22
Payer: MEDICARE

## 2020-09-22 ENCOUNTER — LABORATORY RESULT (OUTPATIENT)
Age: 80
End: 2020-09-22

## 2020-09-22 VITALS
SYSTOLIC BLOOD PRESSURE: 134 MMHG | TEMPERATURE: 97.7 F | HEIGHT: 70 IN | DIASTOLIC BLOOD PRESSURE: 91 MMHG | BODY MASS INDEX: 20.76 KG/M2 | WEIGHT: 145 LBS | HEART RATE: 80 BPM

## 2020-09-22 DIAGNOSIS — N40.0 BENIGN PROSTATIC HYPERPLASIA WITHOUT LOWER URINARY TRACT SYMPMS: ICD-10-CM

## 2020-09-22 DIAGNOSIS — G25.0 ESSENTIAL TREMOR: ICD-10-CM

## 2020-09-22 LAB — SAVE SPECIMEN: NORMAL

## 2020-09-22 PROCEDURE — G0439: CPT | Mod: 25

## 2020-09-22 PROCEDURE — 93000 ELECTROCARDIOGRAM COMPLETE: CPT

## 2020-09-22 PROCEDURE — 90662 IIV NO PRSV INCREASED AG IM: CPT

## 2020-09-22 PROCEDURE — 36415 COLL VENOUS BLD VENIPUNCTURE: CPT

## 2020-09-22 PROCEDURE — G0008: CPT

## 2020-09-23 LAB
ALBUMIN SERPL ELPH-MCNC: 4.6 G/DL
ALP BLD-CCNC: 116 U/L
ALT SERPL-CCNC: 27 U/L
ANION GAP SERPL CALC-SCNC: 10 MMOL/L
APPEARANCE: CLEAR
AST SERPL-CCNC: 25 U/L
BACTERIA: NEGATIVE
BASOPHILS # BLD AUTO: 0.03 K/UL
BASOPHILS NFR BLD AUTO: 0.5 %
BILIRUB SERPL-MCNC: 0.4 MG/DL
BILIRUBIN URINE: NEGATIVE
BLOOD URINE: NEGATIVE
BUN SERPL-MCNC: 17 MG/DL
CALCIUM SERPL-MCNC: 10.1 MG/DL
CHLORIDE SERPL-SCNC: 100 MMOL/L
CHOLEST SERPL-MCNC: 176 MG/DL
CHOLEST/HDLC SERPL: 3.3 RATIO
CO2 SERPL-SCNC: 28 MMOL/L
COLOR: NORMAL
CREAT SERPL-MCNC: 1.18 MG/DL
EOSINOPHIL # BLD AUTO: 0.26 K/UL
EOSINOPHIL NFR BLD AUTO: 4.4 %
ESTIMATED AVERAGE GLUCOSE: 103 MG/DL
GLUCOSE QUALITATIVE U: NEGATIVE
GLUCOSE SERPL-MCNC: 79 MG/DL
HBA1C MFR BLD HPLC: 5.2 %
HCT VFR BLD CALC: 49.4 %
HDLC SERPL-MCNC: 54 MG/DL
HGB BLD-MCNC: 15.9 G/DL
HYALINE CASTS: 0 /LPF
IMM GRANULOCYTES NFR BLD AUTO: 0.2 %
KETONES URINE: NEGATIVE
LDLC SERPL CALC-MCNC: 98 MG/DL
LEUKOCYTE ESTERASE URINE: NEGATIVE
LYMPHOCYTES # BLD AUTO: 1.57 K/UL
LYMPHOCYTES NFR BLD AUTO: 26.8 %
MAN DIFF?: NORMAL
MCHC RBC-ENTMCNC: 32.2 GM/DL
MCHC RBC-ENTMCNC: 32.4 PG
MCV RBC AUTO: 100.8 FL
MICROSCOPIC-UA: NORMAL
MONOCYTES # BLD AUTO: 0.74 K/UL
MONOCYTES NFR BLD AUTO: 12.6 %
NEUTROPHILS # BLD AUTO: 3.24 K/UL
NEUTROPHILS NFR BLD AUTO: 55.5 %
NITRITE URINE: NEGATIVE
PH URINE: 6.5
PLATELET # BLD AUTO: 210 K/UL
POTASSIUM SERPL-SCNC: 4.2 MMOL/L
PROT SERPL-MCNC: 7.2 G/DL
PROTEIN URINE: NEGATIVE
PSA SERPL-MCNC: 6.48 NG/ML
RBC # BLD: 4.9 M/UL
RBC # FLD: 12.9 %
RED BLOOD CELLS URINE: 1 /HPF
SARS-COV-2 IGG SERPL IA-ACNC: <0.1 INDEX
SARS-COV-2 IGG SERPL QL IA: NEGATIVE
SODIUM SERPL-SCNC: 138 MMOL/L
SPECIFIC GRAVITY URINE: 1.01
SQUAMOUS EPITHELIAL CELLS: 0 /HPF
T3FREE SERPL-MCNC: 2.36 PG/ML
T4 FREE SERPL-MCNC: 1.2 NG/DL
T4 SERPL-MCNC: 6.8 UG/DL
TRIGL SERPL-MCNC: 119 MG/DL
TSH SERPL-ACNC: 3.35 UIU/ML
URATE SERPL-MCNC: 6.8 MG/DL
UROBILINOGEN URINE: NORMAL
VIT B12 SERPL-MCNC: 383 PG/ML
WBC # FLD AUTO: 5.85 K/UL
WHITE BLOOD CELLS URINE: 0 /HPF

## 2020-09-23 NOTE — HISTORY OF PRESENT ILLNESS
[de-identified] : This is annual Medicare Preventative examination for this 80 year year old White male.  The patient has been generally feeling well with no new complaints except: tremor of both hands R>L.. A complete evaluation of their current medication was reviewed with them including OTC medication .\par \par Chronic medical problems for which they are being followed by a physician are:\par Hypertension\par \par Losing weight:\par Claims that his appetite is very good.  He is actually eating more\par \par Elevated PSA: I reviewed Dr. Dozier's note\par \par Glaucoma\par \par \par    \par Colonoscopist:    Last done-\par \par Exercises regularly:\par \par A complete Review of Systems is below but it is noteworthy to mention that this patient denies Chest Pain, Dyspnea on Exertion, Palpitations, urinary problems, rectal bleeding or Gastrointestinal problems at this time.\par \par \par

## 2020-09-23 NOTE — HEALTH RISK ASSESSMENT
[0] : 2) Feeling down, depressed, or hopeless: Not at all (0) [Employed] : employed [] :  [Discussed at today's visit] : Advance Directives Discussed at today's visit [No falls in past year] : Patient reported no falls in the past year [Fully functional (bathing, dressing, toileting, transferring, walking, feeding)] : Fully functional (bathing, dressing, toileting, transferring, walking, feeding) [Fully functional (using the telephone, shopping, preparing meals, housekeeping, doing laundry, using] : Fully functional and needs no help or supervision to perform IADLs (using the telephone, shopping, preparing meals, housekeeping, doing laundry, using transportation, managing medications and managing finances) [With Patient/Caregiver] : With Patient/Caregiver [Name: ___] : Health Care Proxy's Name: [unfilled]  [Relationship: ___] : Relationship: [unfilled] [] : No [[id number]] : 0 [Reports changes in hearing] : Reports no changes in hearing [Reports changes in vision] : Reports no changes in vision [Reports changes in dental health] : Reports no changes in dental health [AdvancecareDate] : 1990 [FreeTextEntry4] : Wife is HCP.

## 2020-10-09 ENCOUNTER — RX RENEWAL (OUTPATIENT)
Age: 80
End: 2020-10-09

## 2020-10-14 ENCOUNTER — APPOINTMENT (OUTPATIENT)
Dept: INTERNAL MEDICINE | Facility: CLINIC | Age: 80
End: 2020-10-14
Payer: MEDICARE

## 2020-10-14 VITALS
BODY MASS INDEX: 21.33 KG/M2 | WEIGHT: 149 LBS | DIASTOLIC BLOOD PRESSURE: 83 MMHG | HEART RATE: 71 BPM | HEIGHT: 70 IN | SYSTOLIC BLOOD PRESSURE: 167 MMHG

## 2020-10-14 PROCEDURE — 99213 OFFICE O/P EST LOW 20 MIN: CPT

## 2020-10-14 NOTE — PHYSICAL EXAM
[Non Tender] : non-tender [Soft] : abdomen soft [Normal] : normal rate, regular rhythm, normal S1 and S2 and no murmur heard [Non-distended] : non-distended [No CVA Tenderness] : no CVA  tenderness [de-identified] : erythematous lesions scattered over chest and leg.  no blisters, bleeding or discharge

## 2020-10-14 NOTE — ASSESSMENT
[FreeTextEntry1] : 80 year old male presents complaining of rash on his body.  for the last 3 weeks ago.  was using triamsinolone cream with moderate relief but still present.  associated with itching.  has had this in the past.  resolves on its own.  was seen by dermatologist 3 weeks ago and given lac-hydrin with no relief.  has follow-up on 26th.  denies any new detergents or soaps.  \par appears to be related to dermatitis, eczema \par -steroid cream as directed, risks and benefits of medication discussed in detail.\par -You should make every attempt to identify and avoid provokers of itch. \par -After showering or bathing, before toweling off, you should apply a moisturizing cream or ointment.  Lotions should be avoided in people with dry skin conditions as water is the main ingredient and quickly evaporates.  \par -Eucerin, Aquaphor, Hydrolatum, and Theraplex emollient are all good moisturizers to try at first.  You should then pat dry with a towel.  Do not rub or scrub any areas. \par -You have furthermore been prescribed a topical corticosteroid cream to help decrease inflammation.  \par -Apply steroid cream to affected areas prior to your moisturizer twice a day for no longer than 2 weeks given risk of skin changes (color changes, thickening, stretch marks, etc..) with topical corticosteroid creams used continuously for longer than 2 weeks.\par -Try using a 12-hour non-sedating antihistamine (Claritin or Zyrtec) during the day to help relieve the itching and break the itch-scratch cycle of eczema. \par -Follow-up with Dermatologist, different one provided to patient \par \par

## 2020-10-14 NOTE — HISTORY OF PRESENT ILLNESS
[de-identified] : 80 year old male presents complaining of rash on his body.  for the last 3 weeks ago.  was using triamcinolone cream with moderate relief but still present.  associated with itching.  has had this in the past.  resolves on its own.  \par was seen by dermatologist 3 weeks ago and given lac-hydrin with no relief.  has follow-up on 26th.  denies new detergents or soaps.  \par \par

## 2020-11-30 ENCOUNTER — APPOINTMENT (OUTPATIENT)
Dept: INTERNAL MEDICINE | Facility: CLINIC | Age: 80
End: 2020-11-30
Payer: MEDICARE

## 2020-11-30 VITALS
WEIGHT: 150 LBS | DIASTOLIC BLOOD PRESSURE: 79 MMHG | SYSTOLIC BLOOD PRESSURE: 176 MMHG | HEART RATE: 67 BPM | BODY MASS INDEX: 21.47 KG/M2 | TEMPERATURE: 97.3 F | HEIGHT: 70 IN

## 2020-11-30 PROCEDURE — 99214 OFFICE O/P EST MOD 30 MIN: CPT

## 2020-11-30 NOTE — ASSESSMENT
[FreeTextEntry1] : Hypertension:\par Will need to start medication.\par Start Amlodipine 2.5 mg a day.\par \par Abnormal; wt loss: Has stabiilized.\par \par Granuloma Faciale:\par I have no recommendations.

## 2020-11-30 NOTE — HISTORY OF PRESENT ILLNESS
[de-identified] : CC: Follow up of Hypertension and other medical problems\par \par HPI:\par This is a 80 year male being seen for evaluation of:\par \par Hypertension:\par Patient is feeling well.\par \par Recently diagnosed as having Granuloma Faciale. He is being treated by a Dermatologist for this.\par \par Abnormal weight loss.\par Has stabilized his weight.\par

## 2020-11-30 NOTE — PHYSICAL EXAM
[No Acute Distress] : no acute distress [Well Nourished] : well nourished [Ill-Appearing] : ill-appearing [No JVD] : no jugular venous distention [Supple] : supple [Normal Rate] : normal rate  [Regular Rhythm] : with a regular rhythm [Normal S1, S2] : normal S1 and S2 [Normal] : affect was normal and insight and judgment were intact [de-identified] : Darkened lesion on right cheek

## 2020-12-01 ENCOUNTER — APPOINTMENT (OUTPATIENT)
Dept: DERMATOLOGY | Facility: CLINIC | Age: 80
End: 2020-12-01

## 2021-01-11 ENCOUNTER — APPOINTMENT (OUTPATIENT)
Dept: DERMATOLOGY | Facility: CLINIC | Age: 81
End: 2021-01-11

## 2021-01-25 ENCOUNTER — RX RENEWAL (OUTPATIENT)
Age: 81
End: 2021-01-25

## 2021-03-02 ENCOUNTER — APPOINTMENT (OUTPATIENT)
Dept: INTERNAL MEDICINE | Facility: CLINIC | Age: 81
End: 2021-03-02
Payer: MEDICARE

## 2021-03-02 VITALS
WEIGHT: 147 LBS | HEIGHT: 70 IN | HEART RATE: 62 BPM | TEMPERATURE: 97.7 F | SYSTOLIC BLOOD PRESSURE: 180 MMHG | DIASTOLIC BLOOD PRESSURE: 78 MMHG | BODY MASS INDEX: 21.05 KG/M2

## 2021-03-02 PROCEDURE — 99072 ADDL SUPL MATRL&STAF TM PHE: CPT

## 2021-03-02 PROCEDURE — 99214 OFFICE O/P EST MOD 30 MIN: CPT

## 2021-03-02 NOTE — PHYSICAL EXAM
[Normal] : normal rate, regular rhythm, normal S1 and S2 and no murmur heard [de-identified] : Faint redness of skin in mid chest and back.

## 2021-03-02 NOTE — ASSESSMENT
[FreeTextEntry1] : Hypertension: It is not at optimal level today and will therefore increase his amlodipine to 5 mg once a day.\par \par Skin rash: At the patient's request he was given the name of a allergist.\par \par I answered patient's questions about Covid. \par \par Total Time: Face to Face and reviewing records on the day of visit = 30 minutes.\par

## 2021-03-02 NOTE — HISTORY OF PRESENT ILLNESS
[FreeTextEntry1] : Hypertension follow-up [de-identified] : Patient is feeling well.  He is concerned about a dermatitis that he has had chronically for about 10 years on his chest and legs.  He has seen 2 dermatologists and a allergy specialist and has been told that this is due to dry skin.  He is not happy with the conclusions and would like to be seen by a another allergist.

## 2021-04-30 ENCOUNTER — RX RENEWAL (OUTPATIENT)
Age: 81
End: 2021-04-30

## 2021-06-08 ENCOUNTER — APPOINTMENT (OUTPATIENT)
Dept: INTERNAL MEDICINE | Facility: CLINIC | Age: 81
End: 2021-06-08
Payer: MEDICARE

## 2021-06-08 VITALS
BODY MASS INDEX: 21.19 KG/M2 | SYSTOLIC BLOOD PRESSURE: 132 MMHG | HEIGHT: 70 IN | DIASTOLIC BLOOD PRESSURE: 73 MMHG | HEART RATE: 62 BPM | WEIGHT: 148 LBS | TEMPERATURE: 97.7 F

## 2021-06-08 VITALS — DIASTOLIC BLOOD PRESSURE: 70 MMHG | SYSTOLIC BLOOD PRESSURE: 130 MMHG

## 2021-06-08 DIAGNOSIS — L02.91 CUTANEOUS ABSCESS, UNSPECIFIED: ICD-10-CM

## 2021-06-08 PROCEDURE — 99072 ADDL SUPL MATRL&STAF TM PHE: CPT

## 2021-06-08 PROCEDURE — 36415 COLL VENOUS BLD VENIPUNCTURE: CPT

## 2021-06-08 PROCEDURE — 99214 OFFICE O/P EST MOD 30 MIN: CPT | Mod: 25

## 2021-06-09 LAB
ALBUMIN SERPL ELPH-MCNC: 4.4 G/DL
ALP BLD-CCNC: 114 U/L
ALT SERPL-CCNC: 16 U/L
ANION GAP SERPL CALC-SCNC: 13 MMOL/L
AST SERPL-CCNC: 19 U/L
BASOPHILS # BLD AUTO: 0.04 K/UL
BASOPHILS NFR BLD AUTO: 0.7 %
BILIRUB SERPL-MCNC: 0.4 MG/DL
BUN SERPL-MCNC: 16 MG/DL
CALCIUM SERPL-MCNC: 9.9 MG/DL
CHLORIDE SERPL-SCNC: 102 MMOL/L
CHOLEST SERPL-MCNC: 182 MG/DL
CO2 SERPL-SCNC: 22 MMOL/L
CREAT SERPL-MCNC: 1.16 MG/DL
EOSINOPHIL # BLD AUTO: 0.18 K/UL
EOSINOPHIL NFR BLD AUTO: 3.1 %
GLUCOSE SERPL-MCNC: 99 MG/DL
HCT VFR BLD CALC: 43.8 %
HDLC SERPL-MCNC: 57 MG/DL
HGB BLD-MCNC: 14.8 G/DL
IMM GRANULOCYTES NFR BLD AUTO: 0.2 %
LDLC SERPL CALC-MCNC: 105 MG/DL
LYMPHOCYTES # BLD AUTO: 1.43 K/UL
LYMPHOCYTES NFR BLD AUTO: 24.9 %
MAN DIFF?: NORMAL
MCHC RBC-ENTMCNC: 32.5 PG
MCHC RBC-ENTMCNC: 33.8 GM/DL
MCV RBC AUTO: 96.3 FL
MONOCYTES # BLD AUTO: 0.7 K/UL
MONOCYTES NFR BLD AUTO: 12.2 %
NEUTROPHILS # BLD AUTO: 3.39 K/UL
NEUTROPHILS NFR BLD AUTO: 58.9 %
NONHDLC SERPL-MCNC: 125 MG/DL
PLATELET # BLD AUTO: 224 K/UL
POTASSIUM SERPL-SCNC: 4.4 MMOL/L
PROT SERPL-MCNC: 7.2 G/DL
RBC # BLD: 4.55 M/UL
RBC # FLD: 13.1 %
SODIUM SERPL-SCNC: 137 MMOL/L
T4 SERPL-MCNC: 6 UG/DL
TRIGL SERPL-MCNC: 100 MG/DL
TSH SERPL-ACNC: 2.69 UIU/ML
WBC # FLD AUTO: 5.75 K/UL

## 2021-06-09 NOTE — ASSESSMENT
[FreeTextEntry1] : Hypertension:\par Well controlled.\par Continue current medication.(amlodipine 5 mg/d)\par Low salt diet.\par \par Anxiety:\par Get TSH and T4\par \par I have ordered a CBC, CMP and Lipid Profile in relationship to the above problems.\par \par

## 2021-06-09 NOTE — HISTORY OF PRESENT ILLNESS
[de-identified] : CC: Follow up of Hypertension and other medical problems\par \par HPI:\par This is a 81 year male being seen for evaluation of:\par \par Hypertension:\par Patient is taking his medication properly and is not having any side effects.Anxiety:\par He feels very nervous.\par

## 2021-07-23 ENCOUNTER — OUTPATIENT (OUTPATIENT)
Dept: OUTPATIENT SERVICES | Facility: HOSPITAL | Age: 81
LOS: 1 days | Discharge: HOME | End: 2021-07-23

## 2021-07-23 ENCOUNTER — APPOINTMENT (OUTPATIENT)
Dept: SPEECH THERAPY | Facility: CLINIC | Age: 81
End: 2021-07-23

## 2021-07-30 DIAGNOSIS — H90.3 SENSORINEURAL HEARING LOSS, BILATERAL: ICD-10-CM

## 2021-08-13 ENCOUNTER — APPOINTMENT (OUTPATIENT)
Dept: ORTHOPEDIC SURGERY | Facility: CLINIC | Age: 81
End: 2021-08-13
Payer: MEDICARE

## 2021-08-13 VITALS
SYSTOLIC BLOOD PRESSURE: 159 MMHG | HEART RATE: 53 BPM | OXYGEN SATURATION: 97 % | WEIGHT: 147 LBS | DIASTOLIC BLOOD PRESSURE: 72 MMHG | HEIGHT: 70 IN | BODY MASS INDEX: 21.05 KG/M2

## 2021-08-13 DIAGNOSIS — M70.21 OLECRANON BURSITIS, RIGHT ELBOW: ICD-10-CM

## 2021-08-13 PROCEDURE — 73080 X-RAY EXAM OF ELBOW: CPT | Mod: RT

## 2021-08-13 PROCEDURE — 99214 OFFICE O/P EST MOD 30 MIN: CPT

## 2021-08-13 NOTE — HISTORY OF PRESENT ILLNESS
[Worsening] : worsening [___ days] : [unfilled] day(s) ago [0] : a minimum pain level of 0/10 [4] : a maximum pain level of 4/10 [Intermit.] : ~He/She~ states the symptoms seem to be intermittent [Lifting] : worsened by lifting [Rest] : relieved by rest [de-identified] : Pt presents for initial evaluation with discomfort to the right elbow, pt is right hand dominant, pt has not taken any pain medication, he has no known injury, there is no radiating pain to the upper right extremity. [de-identified] : direct pressure

## 2021-08-13 NOTE — PHYSICAL EXAM
[de-identified] : Physical examination of the right elbow discloses mild effusion to the olecranon bursa.  There is no erythema no tenderness to palpation full stable nontender range of motion to the right elbow of note is a proximal forearm cystic mass consistent with a ganglion the patient has had for several years [de-identified] : X-rays taken of the right elbow in AP lateral injections do not reveal any acute osseous changes, no bone spurs

## 2021-08-13 NOTE — DISCUSSION/SUMMARY
[de-identified] : The patient was informed of his findings he was advised he likely has an olecranon bursitis possibly secondary to having gout.  He is not interested in undergoing any specific treatment for gout at this time and considering the limited extent of his swelling no further treatment will be indicated except for local ice rest and compression wrap.  He prefers to only take OTC NSAIDs.  Follow-up as needed

## 2021-08-17 ENCOUNTER — APPOINTMENT (OUTPATIENT)
Dept: OTOLARYNGOLOGY | Facility: CLINIC | Age: 81
End: 2021-08-17
Payer: MEDICARE

## 2021-08-17 VITALS — HEART RATE: 54 BPM | SYSTOLIC BLOOD PRESSURE: 150 MMHG | DIASTOLIC BLOOD PRESSURE: 70 MMHG

## 2021-08-17 PROCEDURE — 69210 REMOVE IMPACTED EAR WAX UNI: CPT

## 2021-08-17 PROCEDURE — 99203 OFFICE O/P NEW LOW 30 MIN: CPT | Mod: 25

## 2021-08-17 NOTE — HISTORY OF PRESENT ILLNESS
[de-identified] : 81 year old male referred by Dr. Jaime Barrett, PCP, for clogged ears.  States has hearing loss, will need hearing aid clearance.  States has intermittent tinnitus for the past 3-4 years, unsure of laterality.  States has had vertigo for the past 20 years, last episode was 3 years.  [facility] [place] takes Meclizine with good results. Patient denies otalgia, otorrhea, ear infections, dizziness, headaches related to hearing.\par

## 2021-08-17 NOTE — REASON FOR VISIT
[Initial Consultation] : an initial consultation for [FreeTextEntry2] : referred by Dr. Jaime Barrett, PCP, for clogged ears

## 2021-08-19 ENCOUNTER — APPOINTMENT (OUTPATIENT)
Dept: INTERNAL MEDICINE | Facility: CLINIC | Age: 81
End: 2021-08-19
Payer: MEDICARE

## 2021-08-19 VITALS
DIASTOLIC BLOOD PRESSURE: 73 MMHG | BODY MASS INDEX: 21.47 KG/M2 | HEIGHT: 70 IN | HEART RATE: 56 BPM | WEIGHT: 150 LBS | SYSTOLIC BLOOD PRESSURE: 165 MMHG

## 2021-08-19 PROCEDURE — 36415 COLL VENOUS BLD VENIPUNCTURE: CPT

## 2021-08-19 PROCEDURE — 99214 OFFICE O/P EST MOD 30 MIN: CPT | Mod: 25

## 2021-08-19 NOTE — HISTORY OF PRESENT ILLNESS
[de-identified] : 81 year old male here today for a BP check. \par Went to ortho for bursitis and his BP was running high. \par He is feeling somewhat tense.\par He swims, he eats healthy as well. \par

## 2021-08-19 NOTE — ASSESSMENT
[FreeTextEntry1] : HTN: Increase Amlodipine to 7.5mg daily \par \par fu in 1 month \par \par low salt diet

## 2021-08-20 ENCOUNTER — NON-APPOINTMENT (OUTPATIENT)
Age: 81
End: 2021-08-20

## 2021-08-20 LAB
ALBUMIN SERPL ELPH-MCNC: 4.5 G/DL
ALP BLD-CCNC: 116 U/L
ALT SERPL-CCNC: 31 U/L
ANION GAP SERPL CALC-SCNC: 11 MMOL/L
AST SERPL-CCNC: 24 U/L
BASOPHILS # BLD AUTO: 0.04 K/UL
BASOPHILS NFR BLD AUTO: 0.6 %
BILIRUB SERPL-MCNC: 0.4 MG/DL
BUN SERPL-MCNC: 17 MG/DL
CALCIUM SERPL-MCNC: 10.3 MG/DL
CHLORIDE SERPL-SCNC: 101 MMOL/L
CHOLEST SERPL-MCNC: 177 MG/DL
CO2 SERPL-SCNC: 26 MMOL/L
CREAT SERPL-MCNC: 1.13 MG/DL
EOSINOPHIL # BLD AUTO: 0.16 K/UL
EOSINOPHIL NFR BLD AUTO: 2.5 %
GLUCOSE SERPL-MCNC: 123 MG/DL
HCT VFR BLD CALC: 46.7 %
HDLC SERPL-MCNC: 55 MG/DL
HGB BLD-MCNC: 15.5 G/DL
IMM GRANULOCYTES NFR BLD AUTO: 0.5 %
LDLC SERPL CALC-MCNC: 104 MG/DL
LYMPHOCYTES # BLD AUTO: 1.58 K/UL
LYMPHOCYTES NFR BLD AUTO: 25.1 %
MAN DIFF?: NORMAL
MCHC RBC-ENTMCNC: 32.6 PG
MCHC RBC-ENTMCNC: 33.2 GM/DL
MCV RBC AUTO: 98.3 FL
MONOCYTES # BLD AUTO: 0.64 K/UL
MONOCYTES NFR BLD AUTO: 10.2 %
NEUTROPHILS # BLD AUTO: 3.85 K/UL
NEUTROPHILS NFR BLD AUTO: 61.1 %
NONHDLC SERPL-MCNC: 122 MG/DL
PLATELET # BLD AUTO: 230 K/UL
POTASSIUM SERPL-SCNC: 4.3 MMOL/L
PROT SERPL-MCNC: 7.3 G/DL
RBC # BLD: 4.75 M/UL
RBC # FLD: 13.3 %
SODIUM SERPL-SCNC: 138 MMOL/L
T4 SERPL-MCNC: 6.7 UG/DL
TRIGL SERPL-MCNC: 92 MG/DL
TSH SERPL-ACNC: 2.93 UIU/ML
WBC # FLD AUTO: 6.3 K/UL

## 2021-08-27 ENCOUNTER — APPOINTMENT (OUTPATIENT)
Dept: ORTHOPEDIC SURGERY | Facility: CLINIC | Age: 81
End: 2021-08-27

## 2021-09-27 ENCOUNTER — APPOINTMENT (OUTPATIENT)
Dept: ALLERGY | Facility: CLINIC | Age: 81
End: 2021-09-27
Payer: MEDICARE

## 2021-09-27 VITALS
RESPIRATION RATE: 16 BRPM | SYSTOLIC BLOOD PRESSURE: 138 MMHG | HEART RATE: 54 BPM | DIASTOLIC BLOOD PRESSURE: 60 MMHG | OXYGEN SATURATION: 95 % | TEMPERATURE: 98.6 F

## 2021-09-27 PROCEDURE — 99204 OFFICE O/P NEW MOD 45 MIN: CPT | Mod: 25

## 2021-09-27 PROCEDURE — 95018 ALL TSTG PERQ&IQ DRUGS/BIOL: CPT

## 2021-09-27 PROCEDURE — 95004 PERQ TESTS W/ALRGNC XTRCS: CPT

## 2021-09-27 RX ORDER — AMOXICILLIN 500 MG/1
500 CAPSULE ORAL
Qty: 21 | Refills: 0 | Status: DISCONTINUED | COMMUNITY
Start: 2021-05-12

## 2021-09-27 RX ORDER — FLUOROURACIL 50 MG/G
5 CREAM TOPICAL
Qty: 40 | Refills: 0 | Status: DISCONTINUED | COMMUNITY
Start: 2021-04-07

## 2021-09-27 RX ORDER — AMLODIPINE BESYLATE 2.5 MG/1
2.5 TABLET ORAL
Qty: 270 | Refills: 1 | Status: DISCONTINUED | COMMUNITY
Start: 2020-11-30 | End: 2021-09-27

## 2021-09-29 ENCOUNTER — APPOINTMENT (OUTPATIENT)
Dept: PHARMACY | Facility: CLINIC | Age: 81
End: 2021-09-29

## 2021-09-29 ENCOUNTER — APPOINTMENT (OUTPATIENT)
Dept: INTERNAL MEDICINE | Facility: CLINIC | Age: 81
End: 2021-09-29
Payer: MEDICARE

## 2021-09-29 VITALS
SYSTOLIC BLOOD PRESSURE: 173 MMHG | BODY MASS INDEX: 21.62 KG/M2 | HEIGHT: 70 IN | WEIGHT: 151 LBS | HEART RATE: 54 BPM | DIASTOLIC BLOOD PRESSURE: 78 MMHG

## 2021-09-29 PROCEDURE — 99214 OFFICE O/P EST MOD 30 MIN: CPT | Mod: 25

## 2021-09-29 PROCEDURE — 90662 IIV NO PRSV INCREASED AG IM: CPT

## 2021-09-29 PROCEDURE — G0008: CPT

## 2021-09-29 NOTE — HISTORY OF PRESENT ILLNESS
[de-identified] : Mr. NICOLE CALDERÓN is a 81 year old male here today for a follow up of their chronic medical conditions.\par \par Has a rash and saw Dermatology. \par \par HTN: bp has been fluctuating\par on Amlodipine\par \par pt reports drinking a lot of caffeine a day

## 2021-10-07 ENCOUNTER — APPOINTMENT (OUTPATIENT)
Dept: PHARMACY | Facility: CLINIC | Age: 81
End: 2021-10-07

## 2021-10-11 ENCOUNTER — EMERGENCY (EMERGENCY)
Facility: HOSPITAL | Age: 81
LOS: 1 days | Discharge: HOME | End: 2021-10-11
Attending: EMERGENCY MEDICINE | Admitting: EMERGENCY MEDICINE
Payer: MEDICARE

## 2021-10-11 VITALS
SYSTOLIC BLOOD PRESSURE: 154 MMHG | RESPIRATION RATE: 18 BRPM | TEMPERATURE: 98 F | HEART RATE: 62 BPM | DIASTOLIC BLOOD PRESSURE: 73 MMHG | OXYGEN SATURATION: 99 %

## 2021-10-11 PROCEDURE — 99284 EMERGENCY DEPT VISIT MOD MDM: CPT

## 2021-10-11 PROCEDURE — 70450 CT HEAD/BRAIN W/O DYE: CPT | Mod: 26

## 2021-10-11 NOTE — ED PROVIDER NOTE - PATIENT PORTAL LINK FT
You can access the FollowMyHealth Patient Portal offered by [facility] by registering at the following website: [email or web address]. By joining [employer]’s FollowMyHealth portal, you will also be able to view your health information using other applications (apps) compatible with our system.

## 2021-10-11 NOTE — ED PROVIDER NOTE - PHYSICAL EXAMINATION
well appearing NAD    tender left temple no hematoma,  HEENT full EOM perrl  neck supple   heart s1s2,   lungs clear  abd soft  nontender,   extrem no edema,  neuro  A and O x3, 2-12 intact, motor and sensory intact.

## 2021-10-11 NOTE — ED ADULT TRIAGE NOTE - CHIEF COMPLAINT QUOTE
Pt c/o pain to the left side of the head s/p being hit with a golf ball., denies LOC, no blood thinner use. PMH htn

## 2021-10-11 NOTE — ED PROVIDER NOTE - NSFOLLOWUPINSTRUCTIONS_ED_ALL_ED_FT
You were treated for a head injury  your examination was nml and your CT scan  did not show a fracture  or bleed     You were given written head injury instructions   may also use ice to area for local treatment 20 minutes on , then repeat  every few hours.     Advance activity as tolerated.  Continue all previously prescribed medications as directed unless otherwise instructed.  Follow up with your primary care physician in 48-72 hours- bring copies of your results.  Return to the ER for worsening or persistent symptoms, and/or ANY NEW OR CONCERNING SYMPTOMS. If you have issues obtaining follow up, please call: [phone] to obtain a doctor or specialist who takes your insurance in your area.  You may call [phone] to make an appointment with the internal medicine clinic.

## 2021-10-11 NOTE — ED PROVIDER NOTE - OBJECTIVE STATEMENT
81 yr old male with HTN hit in head by golf ball, left temple, pain at site, no LOC, no headache,  wasn't knocked down. No blood thinner, no aspirin. Denies other injury, no weakness or numbness.

## 2021-10-18 NOTE — HISTORY OF PRESENT ILLNESS
[Asthma] : asthma [Allergic Rhinitis] : allergic rhinitis [Venom Reactions] : venom reactions [Food Allergies] : food allergies [de-identified] : Rash on trunk - back - arms and legs - ongoing x 3-4 years - for the past year his symptoms have been more problematic - he consulted with dermatologist - dry skin - no change with treatment - he was treated for eczema with no change in his symptoms.   He is concerned about cause of his symptoms.   His symptoms worsen during the winter.   The rash is fixed - does not come and go.   He has prostate enlargement and antihistamines effect his ability to urinate.  \par \par No skin biopsy has been performed. \par \par He has a history of episodic sinusitis

## 2021-10-18 NOTE — PHYSICAL EXAM
[Alert] : alert [Well Nourished] : well nourished [Healthy Appearance] : healthy appearance [No Acute Distress] : no acute distress [Well Developed] : well developed [Normal Voice/Communication] : normal voice communication [No Neck Mass] : no neck mass was observed [No LAD] : no lymphadenopathy [No Thyroid Mass] : no thyroid mass [No Retractions] : no retractions [Normal Rate] : heart rate was normal  [Normal S1, S2] : normal S1 and S2 [No murmur] : no murmur [Regular Rhythm] : with a regular rhythm [Soft] : abdomen soft [Not Tender] : non-tender [No HSM] : no hepato-splenomegaly [Normal Cervical Lymph Nodes] : cervical [Normal Axillary Lumph Nodes] : axillary [No clubbing] : no clubbing [No Cyanosis] : no cyanosis [Normal Mood] : mood was normal [Normal Affect] : affect was normal [Judgment and Insight Age Appropriate] : judgement and insight is age appropriate [Alert, Awake, Oriented as Age-Appropriate] : alert, awake, oriented as age appropriate [Wheezing] : no wheezing was heard [de-identified] : erythematous patches scattered on chest and back

## 2021-10-18 NOTE — SOCIAL HISTORY
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

## 2021-10-18 NOTE — ASSESSMENT
[FreeTextEntry1] : Recurrent pruritic erythematous eruption - possible [place]'s Disease\par \par Refer to Dr Moy for possible skin biopsy\par Diprolene lotion apply BID\par Allegra 60 mg BID

## 2021-10-18 NOTE — CONSULT LETTER
[Dear  ___] : Dear ~ROSLYN, [Please see my note below.] : Please see my note below. [Sincerely,] : Sincerely, [FreeTextEntry3] : Mitchell B. Boxer, M.D., FAAAAI\par [facility]\par \par Department of Allergy-Immunology\par [facility] \par [address] [place] [address]\par [place] [address]\par Tel:   [phone]\par Fax:  [phone]\par Email: [email or web address]\par

## 2021-12-13 ENCOUNTER — LABORATORY RESULT (OUTPATIENT)
Age: 81
End: 2021-12-13

## 2021-12-13 ENCOUNTER — APPOINTMENT (OUTPATIENT)
Dept: INTERNAL MEDICINE | Facility: CLINIC | Age: 81
End: 2021-12-13
Payer: MEDICARE

## 2021-12-13 ENCOUNTER — NON-APPOINTMENT (OUTPATIENT)
Age: 81
End: 2021-12-13

## 2021-12-13 VITALS
HEART RATE: 67 BPM | WEIGHT: 146 LBS | BODY MASS INDEX: 20.9 KG/M2 | DIASTOLIC BLOOD PRESSURE: 73 MMHG | SYSTOLIC BLOOD PRESSURE: 158 MMHG | HEIGHT: 70 IN

## 2021-12-13 PROCEDURE — 93000 ELECTROCARDIOGRAM COMPLETE: CPT

## 2021-12-13 PROCEDURE — 36415 COLL VENOUS BLD VENIPUNCTURE: CPT

## 2021-12-13 PROCEDURE — G0439: CPT

## 2021-12-13 NOTE — ASSESSMENT
[FreeTextEntry1] : This is a 81 year -old  M who was examined today for an annual preventative physical.  A complete history and physical examination were performed.  The patient seems to follow a healthy lifestyle in that he  follows a good diet and exercises regularly.  \par \par Physical examination was entirely within normal limits , including a normal blood pressure and pulse.  \par \par Cognitive Issues  __x_No   ___Yes\par Fall Risk                __x_No   ___Yes\par \par The following recommendations were made:\par Exercise regularly.\par Maintain a healthy diet.\par _____________________________________________________\par \par Very Concerned about weight, its low\par he eats a lot \par he feels very anxious \par \par Hes due for a colonoscopy in march , 2022 \par He had one 3 years ago

## 2021-12-13 NOTE — HISTORY OF PRESENT ILLNESS
[de-identified] : This is annual Preventative examination for this 81 year year old White male.  The patient has been generally feeling well with no new complaints. A complete evaluation of their current medication was reviewed with them including OTC medication .\par \par Chronic medical problems for which they are being followed by a physician are:\par HTN\par    \par \par Exercises regularly:\par \par A complete Review of Systems is below but it is noteworthy to mention that this patient denies Chest Pain, Dyspnea on Exertion, Palpitations, urinary problems, rectal bleeding or Gastrointestinal problems at this time.\par \par

## 2021-12-13 NOTE — HEALTH RISK ASSESSMENT
[Good] : ~his/her~  mood as  good [Reviewed no changes] : Reviewed, no changes [No falls in past year] : Patient reported no falls in the past year [0] : 2) Feeling down, depressed, or hopeless: Not at all (0) [[id number]] : 0 [AdvancecareDate] : 12/13/21

## 2021-12-14 LAB
25(OH)D3 SERPL-MCNC: 34.8 NG/ML
ALBUMIN SERPL ELPH-MCNC: 4.1 G/DL
ALP BLD-CCNC: 114 U/L
ALT SERPL-CCNC: 20 U/L
ANION GAP SERPL CALC-SCNC: 13 MMOL/L
APPEARANCE: CLEAR
AST SERPL-CCNC: 20 U/L
BACTERIA: NEGATIVE
BASOPHILS # BLD AUTO: 0.04 K/UL
BASOPHILS NFR BLD AUTO: 0.5 %
BILIRUB SERPL-MCNC: 0.4 MG/DL
BILIRUBIN URINE: NEGATIVE
BLOOD URINE: NEGATIVE
BUN SERPL-MCNC: 18 MG/DL
CALCIUM OXALATE CRYSTALS: ABNORMAL
CALCIUM SERPL-MCNC: 9.8 MG/DL
CHLORIDE SERPL-SCNC: 102 MMOL/L
CHOLEST SERPL-MCNC: 165 MG/DL
CO2 SERPL-SCNC: 25 MMOL/L
COLOR: YELLOW
CREAT SERPL-MCNC: 1.17 MG/DL
EOSINOPHIL # BLD AUTO: 0.17 K/UL
EOSINOPHIL NFR BLD AUTO: 2.2 %
ESTIMATED AVERAGE GLUCOSE: 103 MG/DL
GLUCOSE QUALITATIVE U: NEGATIVE
GLUCOSE SERPL-MCNC: 93 MG/DL
HBA1C MFR BLD HPLC: 5.2 %
HCT VFR BLD CALC: 44.7 %
HDLC SERPL-MCNC: 57 MG/DL
HGB BLD-MCNC: 14.5 G/DL
HYALINE CASTS: 0 /LPF
IMM GRANULOCYTES NFR BLD AUTO: 0.3 %
KETONES URINE: NEGATIVE
LDLC SERPL CALC-MCNC: 88 MG/DL
LEUKOCYTE ESTERASE URINE: NEGATIVE
LYMPHOCYTES # BLD AUTO: 1.57 K/UL
LYMPHOCYTES NFR BLD AUTO: 20.7 %
MAN DIFF?: NORMAL
MCHC RBC-ENTMCNC: 32.4 GM/DL
MCHC RBC-ENTMCNC: 33 PG
MCV RBC AUTO: 101.6 FL
MICROSCOPIC-UA: NORMAL
MONOCYTES # BLD AUTO: 0.77 K/UL
MONOCYTES NFR BLD AUTO: 10.2 %
NEUTROPHILS # BLD AUTO: 5.01 K/UL
NEUTROPHILS NFR BLD AUTO: 66.1 %
NITRITE URINE: NEGATIVE
NONHDLC SERPL-MCNC: 108 MG/DL
PH URINE: 6.5
PLATELET # BLD AUTO: 239 K/UL
POTASSIUM SERPL-SCNC: 4.3 MMOL/L
PROT SERPL-MCNC: 6.9 G/DL
PROTEIN URINE: NORMAL
PSA SERPL-MCNC: 6.99 NG/ML
RBC # BLD: 4.4 M/UL
RBC # FLD: 13 %
RED BLOOD CELLS URINE: 0 /HPF
SODIUM SERPL-SCNC: 140 MMOL/L
SPECIFIC GRAVITY URINE: 1.02
SQUAMOUS EPITHELIAL CELLS: 0 /HPF
T4 SERPL-MCNC: 7.4 UG/DL
TRIGL SERPL-MCNC: 99 MG/DL
TSH SERPL-ACNC: 3.22 UIU/ML
URATE SERPL-MCNC: 6.2 MG/DL
UROBILINOGEN URINE: NORMAL
WBC # FLD AUTO: 7.58 K/UL
WHITE BLOOD CELLS URINE: 1 /HPF

## 2022-03-21 ENCOUNTER — APPOINTMENT (OUTPATIENT)
Dept: INTERNAL MEDICINE | Facility: CLINIC | Age: 82
End: 2022-03-21
Payer: MEDICARE

## 2022-03-21 VITALS
HEART RATE: 49 BPM | HEIGHT: 70 IN | BODY MASS INDEX: 21.76 KG/M2 | WEIGHT: 152 LBS | DIASTOLIC BLOOD PRESSURE: 72 MMHG | SYSTOLIC BLOOD PRESSURE: 158 MMHG

## 2022-03-21 PROBLEM — I10 ESSENTIAL (PRIMARY) HYPERTENSION: Chronic | Status: ACTIVE | Noted: 2021-10-11

## 2022-03-21 PROCEDURE — 99213 OFFICE O/P EST LOW 20 MIN: CPT

## 2022-03-22 NOTE — ASSESSMENT
[FreeTextEntry1] : abd discomfort: fu with GI \par continue to monitor symptoms \par report any worsening \par \par 25 minutes were spent in the care and coordination of this patient including reviewing old notes/labs/consults\par

## 2022-03-22 NOTE — HISTORY OF PRESENT ILLNESS
[FreeTextEntry8] : 81 year old male here today with complaints of "reoccurrence of abdominal upset" \par There is no real pain. \par He describes it like a sour stomach and then says its a gas bubble that makes him sometime regurgitated. \par He states its debilitating.\par He does take omeprazole. \par He has had this for 4-5 months, it is not subsiding. \par He is one eye drops and thinks this aggravated.\par His stomach is sore.\par \par states he has thick mucusy sinus secretions, this has been going for \par \par \par \par

## 2022-03-29 ENCOUNTER — LABORATORY RESULT (OUTPATIENT)
Age: 82
End: 2022-03-29

## 2022-03-29 ENCOUNTER — APPOINTMENT (OUTPATIENT)
Dept: GASTROENTEROLOGY | Facility: CLINIC | Age: 82
End: 2022-03-29
Payer: MEDICARE

## 2022-03-29 VITALS
HEIGHT: 70 IN | SYSTOLIC BLOOD PRESSURE: 163 MMHG | HEART RATE: 74 BPM | OXYGEN SATURATION: 98 % | WEIGHT: 149 LBS | TEMPERATURE: 97.9 F | BODY MASS INDEX: 21.33 KG/M2 | DIASTOLIC BLOOD PRESSURE: 73 MMHG

## 2022-03-29 DIAGNOSIS — Z82.3 FAMILY HISTORY OF STROKE: ICD-10-CM

## 2022-03-29 DIAGNOSIS — K58.2 MIXED IRRITABLE BOWEL SYNDROME: ICD-10-CM

## 2022-03-29 DIAGNOSIS — K21.9 GASTRO-ESOPHAGEAL REFLUX DISEASE W/OUT ESOPHAGITIS: ICD-10-CM

## 2022-03-29 DIAGNOSIS — Z80.1 FAMILY HISTORY OF MALIGNANT NEOPLASM OF TRACHEA, BRONCHUS AND LUNG: ICD-10-CM

## 2022-03-29 DIAGNOSIS — Z86.79 PERSONAL HISTORY OF OTHER DISEASES OF THE CIRCULATORY SYSTEM: ICD-10-CM

## 2022-03-29 PROCEDURE — 99204 OFFICE O/P NEW MOD 45 MIN: CPT

## 2022-03-29 NOTE — ASSESSMENT
[FreeTextEntry1] : Acute gastroenteritis.  Patient symptoms have improved. Dietary and lifestyle modification discussed with the patient.  He is to remain on the omeprazole.  FIT test and abdominal sonogram ordered.  He will forward a copy of his upcoming colonoscopy results to me.  Follow-up appointment in 3 weeks.  This was all discussed with the patient.  He understands and all questions were answered.\par

## 2022-03-29 NOTE — PHYSICAL EXAM
[General Appearance - Alert] : alert [General Appearance - In No Acute Distress] : in no acute distress [Sclera] : the sclera and conjunctiva were normal [PERRL With Normal Accommodation] : pupils were equal in size, round, and reactive to light [Extraocular Movements] : extraocular movements were intact [Outer Ear] : the ears and nose were normal in appearance [Neck Appearance] : the appearance of the neck was normal [Neck Cervical Mass (___cm)] : no neck mass was observed [Jugular Venous Distention Increased] : there was no jugular-venous distention [Thyroid Diffuse Enlargement] : the thyroid was not enlarged [Thyroid Nodule] : there were no palpable thyroid nodules [Auscultation Breath Sounds / Voice Sounds] : lungs were clear to auscultation bilaterally [Heart Rate And Rhythm] : heart rate was normal and rhythm regular [Heart Sounds] : normal S1 and S2 [Heart Sounds Gallop] : no gallops [Murmurs] : no murmurs [Heart Sounds Pericardial Friction Rub] : no pericardial rub [Bowel Sounds] : normal bowel sounds [Abdomen Soft] : soft [Abdomen Tenderness] : non-tender [] : no hepato-splenomegaly [Abdomen Mass (___ Cm)] : no abdominal mass palpated [Cervical Lymph Nodes Enlarged Posterior Bilaterally] : posterior cervical [Cervical Lymph Nodes Enlarged Anterior Bilaterally] : anterior cervical [Supraclavicular Lymph Nodes Enlarged Bilaterally] : supraclavicular [Axillary Lymph Nodes Enlarged Bilaterally] : axillary [Femoral Lymph Nodes Enlarged Bilaterally] : femoral [No CVA Tenderness] : no ~M costovertebral angle tenderness [No Spinal Tenderness] : no spinal tenderness [Abnormal Walk] : normal gait [Nail Clubbing] : no clubbing  or cyanosis of the fingernails [Musculoskeletal - Swelling] : no joint swelling seen [Motor Tone] : muscle strength and tone were normal [Deep Tendon Reflexes (DTR)] : deep tendon reflexes were 2+ and symmetric [Sensation] : the sensory exam was normal to light touch and pinprick [No Focal Deficits] : no focal deficits [Oriented To Time, Place, And Person] : oriented to person, place, and time [Impaired Insight] : insight and judgment were intact [Affect] : the affect was normal

## 2022-03-29 NOTE — HISTORY OF PRESENT ILLNESS
[Heartburn] : stable heartburn [Nausea] : denies nausea [Vomiting] : denies vomiting [Constipation] : denies constipation [Yellow Skin Or Eyes (Jaundice)] : denies jaundice [Abdominal Swelling] : denies abdominal swelling [Rectal Pain] : denies rectal pain [Diarrhea] : diarrhea [Abdominal Pain] : abdominal pain [GERD] : gastroesophageal reflux disease [Diverticulitis] : diverticulitis [Appendectomy] : appendectomy [Wt Gain ___ Lbs] : no recent weight gain [Hiatus Hernia] : no hiatus hernia [Peptic Ulcer Disease] : no peptic ulcer disease [Pancreatitis] : no pancreatitis [Cholelithiasis] : no cholelithiasis [Kidney Stone] : no kidney stone [Inflammatory Bowel Disease] : no inflammatory bowel disease [Irritable Bowel Syndrome] : no irritable bowel syndrome [Alcohol Abuse] : no alcohol abuse [Malignancy] : no malignancy [Abdominal Surgery] : no abdominal surgery [Cholecystectomy] : no cholecystectomy [de-identified] : 82-year-old man present 10 days ago after eating out developed GI upset.  Specifically had epigastric pain belching and sour stomach for approximately 2 to 3 days.  His son experienced similar plaints.  He subsequently began to feel better and his symptoms resolved.  He is now improved.  He denies any rectal bleeding, melena or hematemesis.  He had a similar episode of GI upset but not quite as significant about 6 months ago that also resolved spontaneously.  He has occasional constipation followed by diarrhea and is scheduled for a screening colonoscopy with Dr. Mo Willingham.  His last colonoscopy 3 years ago he had a small polyp removed.

## 2022-04-06 ENCOUNTER — APPOINTMENT (OUTPATIENT)
Dept: ULTRASOUND IMAGING | Facility: CLINIC | Age: 82
End: 2022-04-06
Payer: MEDICARE

## 2022-04-06 ENCOUNTER — OUTPATIENT (OUTPATIENT)
Dept: OUTPATIENT SERVICES | Facility: HOSPITAL | Age: 82
LOS: 1 days | End: 2022-04-06
Payer: MEDICARE

## 2022-04-06 DIAGNOSIS — Z00.8 ENCOUNTER FOR OTHER GENERAL EXAMINATION: ICD-10-CM

## 2022-04-06 PROCEDURE — 76700 US EXAM ABDOM COMPLETE: CPT

## 2022-04-06 PROCEDURE — 76700 US EXAM ABDOM COMPLETE: CPT | Mod: 26

## 2022-04-07 ENCOUNTER — NON-APPOINTMENT (OUTPATIENT)
Age: 82
End: 2022-04-07

## 2022-04-12 ENCOUNTER — APPOINTMENT (OUTPATIENT)
Dept: GASTROENTEROLOGY | Facility: CLINIC | Age: 82
End: 2022-04-12

## 2022-06-07 ENCOUNTER — APPOINTMENT (OUTPATIENT)
Dept: INTERNAL MEDICINE | Facility: CLINIC | Age: 82
End: 2022-06-07
Payer: MEDICARE

## 2022-06-07 ENCOUNTER — NON-APPOINTMENT (OUTPATIENT)
Age: 82
End: 2022-06-07

## 2022-06-07 VITALS
HEART RATE: 50 BPM | DIASTOLIC BLOOD PRESSURE: 72 MMHG | SYSTOLIC BLOOD PRESSURE: 155 MMHG | WEIGHT: 147 LBS | HEIGHT: 70 IN | BODY MASS INDEX: 21.05 KG/M2

## 2022-06-07 DIAGNOSIS — L98.9 DISORDER OF THE SKIN AND SUBCUTANEOUS TISSUE, UNSPECIFIED: ICD-10-CM

## 2022-06-07 DIAGNOSIS — K57.92 DIVERTICULITIS OF INTESTINE, PART UNSPECIFIED, W/OUT PERFORATION OR ABSCESS W/OUT BLEEDING: ICD-10-CM

## 2022-06-07 DIAGNOSIS — R21 RASH AND OTHER NONSPECIFIC SKIN ERUPTION: ICD-10-CM

## 2022-06-07 DIAGNOSIS — R10.9 UNSPECIFIED ABDOMINAL PAIN: ICD-10-CM

## 2022-06-07 DIAGNOSIS — H93.8X3 OTHER SPECIFIED DISORDERS OF EAR, BILATERAL: ICD-10-CM

## 2022-06-07 DIAGNOSIS — R06.02 SHORTNESS OF BREATH: ICD-10-CM

## 2022-06-07 DIAGNOSIS — R74.8 ABNORMAL LEVELS OF OTHER SERUM ENZYMES: ICD-10-CM

## 2022-06-07 DIAGNOSIS — K52.9 NONINFECTIVE GASTROENTERITIS AND COLITIS, UNSPECIFIED: ICD-10-CM

## 2022-06-07 DIAGNOSIS — T14.8XXA OTHER INJURY OF UNSPECIFIED BODY REGION, INITIAL ENCOUNTER: ICD-10-CM

## 2022-06-07 DIAGNOSIS — M25.521 PAIN IN RIGHT ELBOW: ICD-10-CM

## 2022-06-07 DIAGNOSIS — H61.23 IMPACTED CERUMEN, BILATERAL: ICD-10-CM

## 2022-06-07 DIAGNOSIS — Z87.898 PERSONAL HISTORY OF OTHER SPECIFIED CONDITIONS: ICD-10-CM

## 2022-06-07 DIAGNOSIS — S63.659S SPRAIN OF METACARPOPHALANGEAL JOINT OF UNSPECIFIED FINGER, SEQUELA: ICD-10-CM

## 2022-06-07 DIAGNOSIS — Z87.2 PERSONAL HISTORY OF DISEASES OF THE SKIN AND SUBCUTANEOUS TISSUE: ICD-10-CM

## 2022-06-07 PROCEDURE — 36415 COLL VENOUS BLD VENIPUNCTURE: CPT

## 2022-06-07 PROCEDURE — 99214 OFFICE O/P EST MOD 30 MIN: CPT | Mod: 25

## 2022-06-07 PROCEDURE — 93000 ELECTROCARDIOGRAM COMPLETE: CPT

## 2022-06-07 NOTE — HISTORY OF PRESENT ILLNESS
[de-identified] : Mr. NICOLE CALDERÓN is a 82 year old male here today for a follow up of his chronic medical conditions.\par \par He does have SOB when walking a long distance but states he can do 130 push ups. \par He is wondering if he should have a stress test\par No CP\par Every so often he feels like his heart is racing. \par He has seen cardio in the past \par \par Also with c/o Back and neck pain for 25 years but now getting worse. \par He had seen chiropractors in the past. \par His shoulders feel heavy when he walks and feels he can't keep his head up. \par \par HTN: on meds \par \par He did have an eye procedure last week

## 2022-06-07 NOTE — PHYSICAL EXAM
[Normal] : normal rate, regular rhythm, normal S1 and S2 and no murmur heard [de-identified] : Marked kyphoscoliosis of lower thoracic and lumbar spine

## 2022-06-07 NOTE — PLAN
[FreeTextEntry1] : Hypertension:\par Well controlled.\par Continue current medication, losartan 100 mg a day\par Low salt diet.\par \par Neck and back pain:\par Patient has marked kyphoscoliosis in his mid back.  I believe that he would probably benefit from physical therapy, but have asked him to see Dr. Junior in consultation first.\par \par Dyspnea:\par His EKG today shows sinus bradycardia with no acute changes.  I have advised the patient to see his cardiologist, Dr. Gomez.\par \par I have ordered a CBC, CMP and Lipid Profile in relationship to the above problems.\par  Breast cancer in female    HTN (hypertension)

## 2022-06-08 LAB
BASOPHILS # BLD AUTO: 0.04 K/UL
BASOPHILS NFR BLD AUTO: 0.6 %
CHOLEST SERPL-MCNC: 177 MG/DL
EOSINOPHIL # BLD AUTO: 0.16 K/UL
EOSINOPHIL NFR BLD AUTO: 2.5 %
ESTIMATED AVERAGE GLUCOSE: 105 MG/DL
HBA1C MFR BLD HPLC: 5.3 %
HCT VFR BLD CALC: 43.5 %
HDLC SERPL-MCNC: 61 MG/DL
HGB BLD-MCNC: 14.4 G/DL
IMM GRANULOCYTES NFR BLD AUTO: 0.3 %
LDLC SERPL CALC-MCNC: 102 MG/DL
LYMPHOCYTES # BLD AUTO: 1.4 K/UL
LYMPHOCYTES NFR BLD AUTO: 21.6 %
MAN DIFF?: NORMAL
MCHC RBC-ENTMCNC: 32.4 PG
MCHC RBC-ENTMCNC: 33.1 GM/DL
MCV RBC AUTO: 98 FL
MONOCYTES # BLD AUTO: 0.89 K/UL
MONOCYTES NFR BLD AUTO: 13.8 %
NEUTROPHILS # BLD AUTO: 3.96 K/UL
NEUTROPHILS NFR BLD AUTO: 61.2 %
NONHDLC SERPL-MCNC: 116 MG/DL
PLATELET # BLD AUTO: 211 K/UL
RBC # BLD: 4.44 M/UL
RBC # FLD: 13.4 %
TRIGL SERPL-MCNC: 71 MG/DL
URATE SERPL-MCNC: 6.3 MG/DL
WBC # FLD AUTO: 6.47 K/UL

## 2022-06-09 ENCOUNTER — NON-APPOINTMENT (OUTPATIENT)
Age: 82
End: 2022-06-09

## 2022-06-13 ENCOUNTER — APPOINTMENT (OUTPATIENT)
Dept: ORTHOPEDIC SURGERY | Facility: CLINIC | Age: 82
End: 2022-06-13
Payer: MEDICARE

## 2022-06-13 VITALS
HEIGHT: 70 IN | BODY MASS INDEX: 21.05 KG/M2 | WEIGHT: 147 LBS | SYSTOLIC BLOOD PRESSURE: 138 MMHG | DIASTOLIC BLOOD PRESSURE: 79 MMHG

## 2022-06-13 DIAGNOSIS — M54.2 CERVICALGIA: ICD-10-CM

## 2022-06-13 DIAGNOSIS — M41.9 SCOLIOSIS, UNSPECIFIED: ICD-10-CM

## 2022-06-13 LAB
ALBUMIN SERPL ELPH-MCNC: 4.7 G/DL
ALP BLD-CCNC: 119 U/L
ALT SERPL-CCNC: 15 U/L
ANION GAP SERPL CALC-SCNC: 12 MMOL/L
AST SERPL-CCNC: 21 U/L
BILIRUB SERPL-MCNC: 0.4 MG/DL
BUN SERPL-MCNC: 21 MG/DL
CALCIUM SERPL-MCNC: 10.3 MG/DL
CHLORIDE SERPL-SCNC: 101 MMOL/L
CO2 SERPL-SCNC: 25 MMOL/L
CREAT SERPL-MCNC: 1.21 MG/DL
EGFR: 60 ML/MIN/1.73M2
GLUCOSE SERPL-MCNC: 86 MG/DL
POTASSIUM SERPL-SCNC: 4.4 MMOL/L
PROT SERPL-MCNC: 7.6 G/DL
SODIUM SERPL-SCNC: 138 MMOL/L

## 2022-06-13 PROCEDURE — 72110 X-RAY EXAM L-2 SPINE 4/>VWS: CPT

## 2022-06-13 PROCEDURE — 99214 OFFICE O/P EST MOD 30 MIN: CPT

## 2022-06-13 PROCEDURE — 72050 X-RAY EXAM NECK SPINE 4/5VWS: CPT

## 2022-06-13 PROCEDURE — 72070 X-RAY EXAM THORAC SPINE 2VWS: CPT

## 2022-06-13 NOTE — HISTORY OF PRESENT ILLNESS
[de-identified] : This is an 82-year-old male that is here today for evaluation of his neck pain.  He has been dealing with the symptoms for quite some time.  He denies any real radicular type pain past his elbows and his arms.  He denies any issues with balance.  He has had some changes in his handwriting over the years.  He does note that there has been increased effort to maintain horizontal gaze during the day.  He feels this tightness over his neck.  He denies any bowel bladder issues.  Denies any saddle anesthesia.

## 2022-06-13 NOTE — PHYSICAL EXAM
[de-identified] : Cervical Physical Exam\par \par Gait - Normal\par \par Station - Normal\par \par Sagittal balance - Normal \par \par Compensatory mechanism? - None\par \par Horizontal gaze - Maintained\par \par Heel walk - Normal\par \par Toe walk - Normal\par \par Reflexes\par Biceps - Normal\par Triceps - Normal\par Brachioradialis - Normal\par Patellar - Normal\par Gastroc - Normal\par Clonus -No\par \par Ibarra´s - None\par \par Shoulder Exam - Normal\par \par Spurling´s - None\par \par Wrist Pulses -2+ radial/ulnar\par \par Foot Pulses -2+ DP/PT\par \par Cervical range of motion - Normal\par \par Sensation \par C5-T1 sensation intact to light touch bilaterally\par \par L1-S1 sensation intact to light touch bilaterally\par \par Motor\par \par \par 	Deltoid	Biceps	Triceps	WF	WE	IO	\par Right	4/5	5/5	5/5	5/5	5/5	5/5	5/5\par Left	4/5	5/5	5/5	5/5	5/5	5/5	5/5\par \par \par 	IP	Quad	HS	TA	Gastroc	EHL\par Right	5/5	5/5	5/5	5/5	5/5	5/5\par Left	5/5	5/5	5/5	5/5	5/5	5/5 [de-identified] : Cervical radiographs\par C1-C3 focal kyphosis\par Significant facet arthropathy\par \par Thoracic radiographs\par Thoracic kyphosis increased\par Thoracic spondylosis\par \par Lumbar radiographs\par Lordosis consistent with age\par No fracture noted

## 2022-07-08 ENCOUNTER — APPOINTMENT (OUTPATIENT)
Dept: MRI IMAGING | Facility: CLINIC | Age: 82
End: 2022-07-08

## 2022-07-08 ENCOUNTER — OUTPATIENT (OUTPATIENT)
Dept: OUTPATIENT SERVICES | Facility: HOSPITAL | Age: 82
LOS: 1 days | End: 2022-07-08
Payer: MEDICARE

## 2022-07-08 DIAGNOSIS — Z00.8 ENCOUNTER FOR OTHER GENERAL EXAMINATION: ICD-10-CM

## 2022-07-08 PROCEDURE — 72141 MRI NECK SPINE W/O DYE: CPT

## 2022-07-08 PROCEDURE — 72141 MRI NECK SPINE W/O DYE: CPT | Mod: 26

## 2022-07-11 ENCOUNTER — APPOINTMENT (OUTPATIENT)
Dept: ORTHOPEDIC SURGERY | Facility: CLINIC | Age: 82
End: 2022-07-11

## 2022-07-15 ENCOUNTER — NON-APPOINTMENT (OUTPATIENT)
Age: 82
End: 2022-07-15

## 2022-07-26 ENCOUNTER — NON-APPOINTMENT (OUTPATIENT)
Age: 82
End: 2022-07-26

## 2022-07-27 ENCOUNTER — NON-APPOINTMENT (OUTPATIENT)
Age: 82
End: 2022-07-27

## 2022-07-27 ENCOUNTER — APPOINTMENT (OUTPATIENT)
Dept: CARDIOLOGY | Facility: CLINIC | Age: 82
End: 2022-07-27

## 2022-07-27 VITALS
TEMPERATURE: 98.3 F | OXYGEN SATURATION: 99 % | WEIGHT: 146 LBS | HEIGHT: 70 IN | BODY MASS INDEX: 20.9 KG/M2 | DIASTOLIC BLOOD PRESSURE: 70 MMHG | SYSTOLIC BLOOD PRESSURE: 138 MMHG | HEART RATE: 48 BPM

## 2022-07-27 DIAGNOSIS — R00.2 PALPITATIONS: ICD-10-CM

## 2022-07-27 PROCEDURE — 99204 OFFICE O/P NEW MOD 45 MIN: CPT | Mod: 25

## 2022-07-27 PROCEDURE — 93000 ELECTROCARDIOGRAM COMPLETE: CPT

## 2022-07-27 NOTE — HISTORY OF PRESENT ILLNESS
[FreeTextEntry1] : This is an 81 y/o male with a pmhx of HTN here for a follow up. Pt admits to dyspnea with going up stairs and with gardening. pt also with fatigue pt denies any chest  pain dizziness ,lightheadedness ,nausea vomiting diaphoresis\par

## 2022-07-28 ENCOUNTER — APPOINTMENT (OUTPATIENT)
Dept: CARDIOLOGY | Facility: CLINIC | Age: 82
End: 2022-07-28

## 2022-07-28 LAB
ANION GAP SERPL CALC-SCNC: 13 MMOL/L
BASOPHILS # BLD AUTO: 0.06 K/UL
BASOPHILS NFR BLD AUTO: 0.7 %
BUN SERPL-MCNC: 24 MG/DL
CALCIUM SERPL-MCNC: 10 MG/DL
CHLORIDE SERPL-SCNC: 102 MMOL/L
CO2 SERPL-SCNC: 24 MMOL/L
CREAT SERPL-MCNC: 1.17 MG/DL
EGFR: 62 ML/MIN/1.73M2
EOSINOPHIL # BLD AUTO: 0.15 K/UL
EOSINOPHIL NFR BLD AUTO: 1.7 %
GLUCOSE SERPL-MCNC: 83 MG/DL
HCT VFR BLD CALC: 47.3 %
HGB BLD-MCNC: 15.2 G/DL
IMM GRANULOCYTES NFR BLD AUTO: 0.3 %
LYMPHOCYTES # BLD AUTO: 1.58 K/UL
LYMPHOCYTES NFR BLD AUTO: 18.1 %
MAN DIFF?: NORMAL
MCHC RBC-ENTMCNC: 32.1 GM/DL
MCHC RBC-ENTMCNC: 32.1 PG
MCV RBC AUTO: 99.8 FL
MONOCYTES # BLD AUTO: 0.87 K/UL
MONOCYTES NFR BLD AUTO: 9.9 %
NEUTROPHILS # BLD AUTO: 6.06 K/UL
NEUTROPHILS NFR BLD AUTO: 69.3 %
PLATELET # BLD AUTO: 246 K/UL
POTASSIUM SERPL-SCNC: 4.5 MMOL/L
RBC # BLD: 4.74 M/UL
RBC # FLD: 13.2 %
SODIUM SERPL-SCNC: 139 MMOL/L
T4 FREE SERPL-MCNC: 1.2 NG/DL
TSH SERPL-ACNC: 3.99 UIU/ML
WBC # FLD AUTO: 8.75 K/UL

## 2022-07-28 PROCEDURE — 93306 TTE W/DOPPLER COMPLETE: CPT

## 2022-07-28 PROCEDURE — A9500: CPT

## 2022-07-28 PROCEDURE — 93015 CV STRESS TEST SUPVJ I&R: CPT

## 2022-07-28 PROCEDURE — 78452 HT MUSCLE IMAGE SPECT MULT: CPT

## 2022-07-28 RX ORDER — REGADENOSON 0.08 MG/ML
0.4 INJECTION, SOLUTION INTRAVENOUS
Qty: 1 | Refills: 0 | Status: COMPLETED | OUTPATIENT
Start: 2022-07-28

## 2022-07-28 RX ADMIN — REGADENOSON 5 MG/5ML: 0.08 INJECTION, SOLUTION INTRAVENOUS at 00:00

## 2022-07-29 ENCOUNTER — NON-APPOINTMENT (OUTPATIENT)
Age: 82
End: 2022-07-29

## 2022-08-04 ENCOUNTER — OUTPATIENT (OUTPATIENT)
Dept: OUTPATIENT SERVICES | Facility: HOSPITAL | Age: 82
LOS: 1 days | End: 2022-08-04
Payer: MEDICARE

## 2022-08-04 ENCOUNTER — APPOINTMENT (OUTPATIENT)
Dept: CT IMAGING | Facility: CLINIC | Age: 82
End: 2022-08-04

## 2022-08-04 DIAGNOSIS — I77.810 THORACIC AORTIC ECTASIA: ICD-10-CM

## 2022-08-04 DIAGNOSIS — Z00.8 ENCOUNTER FOR OTHER GENERAL EXAMINATION: ICD-10-CM

## 2022-08-04 PROCEDURE — 71250 CT THORAX DX C-: CPT | Mod: 26

## 2022-08-04 PROCEDURE — 71250 CT THORAX DX C-: CPT

## 2022-08-08 ENCOUNTER — NON-APPOINTMENT (OUTPATIENT)
Age: 82
End: 2022-08-08

## 2022-08-11 ENCOUNTER — RX RENEWAL (OUTPATIENT)
Age: 82
End: 2022-08-11

## 2022-08-12 PROCEDURE — 93241 XTRNL ECG REC>48HR<7D: CPT

## 2022-08-15 ENCOUNTER — RX RENEWAL (OUTPATIENT)
Age: 82
End: 2022-08-15

## 2022-08-15 ENCOUNTER — RESULT CHARGE (OUTPATIENT)
Age: 82
End: 2022-08-15

## 2022-08-16 ENCOUNTER — APPOINTMENT (OUTPATIENT)
Dept: PULMONOLOGY | Facility: CLINIC | Age: 82
End: 2022-08-16

## 2022-08-16 VITALS
HEART RATE: 52 BPM | SYSTOLIC BLOOD PRESSURE: 115 MMHG | OXYGEN SATURATION: 99 % | DIASTOLIC BLOOD PRESSURE: 56 MMHG | TEMPERATURE: 98.2 F

## 2022-08-16 DIAGNOSIS — R06.09 OTHER FORMS OF DYSPNEA: ICD-10-CM

## 2022-08-16 PROCEDURE — ZZZZZ: CPT

## 2022-08-16 PROCEDURE — 94729 DIFFUSING CAPACITY: CPT

## 2022-08-16 PROCEDURE — 94060 EVALUATION OF WHEEZING: CPT

## 2022-08-16 PROCEDURE — 94727 GAS DIL/WSHOT DETER LNG VOL: CPT

## 2022-08-16 PROCEDURE — 99203 OFFICE O/P NEW LOW 30 MIN: CPT | Mod: 25

## 2022-08-16 NOTE — CONSULT LETTER
[FreeTextEntry1] : Dear ,\par \par I had the pleasure of evaluating your patient, NICOLE CALDERÓN today in pulmonary consultation.  Please refer to my attached note for my findings and recommendations.\par \par \par Thank you for allowing me to participate in the care of your patient, please feel free to call with any questions or concerns.\par \par \par Sincerely,\par \par Zaria Rivera MD\par [facility] \par [facility]\par \par

## 2022-08-16 NOTE — PROCEDURE
[FreeTextEntry1] : PFT: mild obstruction without a significant bronchodilator response.  Lung volumes normal. DLCO normal.  FVC improved from 2019.\par \par \par EXAM: [id number] - CT CHEST - ORDERED BY: ARVIN CLEMENS\par \par \par PROCEDURE DATE: 08/04/2022\par \par \par \par INTERPRETATION: INDICATION: Dyspnea on exertion\par \par TECHNIQUE: Volumetric images of the chest without intravenous contrast. Maximum intensity projection images were generated.\par \par COMPARISON: CT abdomen and pelvis August 6, 2015.\par \par FINDINGS:\par \par CARDIOVASCULAR, MEDIASTINUM, THYROID:\par Visualized thyroid gland: Grossly unremarkable.\par \par Vasculature:\par Thoracic aorta: Normal in caliber and course. The ascending aorta measures 3.7 cm at the level of the main pulmonary artery and the descending aorta measures 2.4 cm at the same level. Mild atherosclerotic calcification of the thoracic aorta. There are aortic valvular calcifications.\par Main pulmonary artery: Normal in caliber.\par \par Heart and pericardium: Normal heart size. No pericardial effusion. Multivessel coronary artery calcifications are visualized.\par \par Esophagus: Normally decompressed.\par \par LYMPH NODES: No mediastinal or axillary lymphadenopathy. No gross hilar adenopathy.\par \par MAJOR AIRWAYS: Patent trachea and mainstem bronchi.\par \par LUNGS: No focal consolidation. No evidence of interstitial lung disease.\par \par Scattered indeterminate pulmonary nodules including a 4 mm right upper lobe nodule (series 3, image 256) and 2 mm right lower lobe nodule (series 3, image 395). There is a 2 mm right middle lobe nodule abutting the minor fissure (series 3, image 365), likely representing an intrapulmonary lymph node.\par \par PLEURA: No pleural effusion or pneumothorax.\par \par UPPER ABDOMEN: Evaluation of the partially-imaged upper abdomen demonstrates no acute abnormality.\par \par CHEST WALL: Normal.\par \par BONES: Mild multilevel degenerative disc disease.\par \par IMPRESSION:\par \par No evidence of pneumonia, pleural effusion, or pneumothorax.\par \par Scattered bilateral indeterminate pulmonary nodules measuring up to 4 mm in size, for which a follow-up chest CT can be considered in one year if this patient has an elevated risk of lung cancer (such as a history of cigarette smoking, family history of lung cancer, or has had significant exposure to asbestos or radon).\par \par --- End of Report ---\par \par \par \par \par \par \par  SAMI JONES DO; Attending Radiologist\par This document has been electronically signed. Aug 4 2022 1:09PM

## 2022-08-16 NOTE — HISTORY OF PRESENT ILLNESS
[Former] : former [TextBox_4] : 82M had ct for coronaries, found to have few sub centimeter lung nodules.\par \par prior smoking >50 years ago in his teens/twenties\par no pulm complaints at present\par denies sob/cough/wheeze etc\par no recent illness\par

## 2022-09-12 ENCOUNTER — APPOINTMENT (OUTPATIENT)
Dept: OTOLARYNGOLOGY | Facility: CLINIC | Age: 82
End: 2022-09-12

## 2022-09-22 ENCOUNTER — APPOINTMENT (OUTPATIENT)
Dept: INTERNAL MEDICINE | Facility: CLINIC | Age: 82
End: 2022-09-22

## 2022-09-22 ENCOUNTER — NON-APPOINTMENT (OUTPATIENT)
Age: 82
End: 2022-09-22

## 2022-09-22 VITALS
WEIGHT: 146 LBS | HEIGHT: 70 IN | SYSTOLIC BLOOD PRESSURE: 119 MMHG | BODY MASS INDEX: 20.9 KG/M2 | OXYGEN SATURATION: 99 % | TEMPERATURE: 97.6 F | HEART RATE: 53 BPM | DIASTOLIC BLOOD PRESSURE: 68 MMHG

## 2022-09-22 VITALS — DIASTOLIC BLOOD PRESSURE: 75 MMHG | SYSTOLIC BLOOD PRESSURE: 140 MMHG

## 2022-09-22 VITALS — DIASTOLIC BLOOD PRESSURE: 75 MMHG | SYSTOLIC BLOOD PRESSURE: 135 MMHG

## 2022-09-22 DIAGNOSIS — R42 DIZZINESS AND GIDDINESS: ICD-10-CM

## 2022-09-22 PROCEDURE — 99214 OFFICE O/P EST MOD 30 MIN: CPT | Mod: 25

## 2022-09-22 PROCEDURE — 93000 ELECTROCARDIOGRAM COMPLETE: CPT

## 2022-09-22 RX ORDER — BETAMETHASONE DIPROPIONATE 0.5 MG/ML
0.05 LOTION, AUGMENTED TOPICAL TWICE DAILY
Qty: 150 | Refills: 1 | Status: DISCONTINUED | COMMUNITY
Start: 2021-09-27 | End: 2022-09-22

## 2022-09-22 RX ORDER — AMOXICILLIN 500 MG/1
500 TABLET, FILM COATED ORAL
Qty: 14 | Refills: 0 | Status: DISCONTINUED | COMMUNITY
Start: 2017-05-08 | End: 2022-09-22

## 2022-09-22 RX ORDER — TRIAMCINOLONE ACETONIDE 5 MG/G
0.5 OINTMENT TOPICAL
Qty: 75 | Refills: 18 | Status: DISCONTINUED | COMMUNITY
Start: 2020-10-14 | End: 2022-09-22

## 2022-09-23 PROBLEM — R42 DIZZINESS: Status: ACTIVE | Noted: 2022-07-27

## 2022-09-23 LAB
ALBUMIN SERPL ELPH-MCNC: 4.4 G/DL
ALP BLD-CCNC: 115 U/L
ALT SERPL-CCNC: 17 U/L
ANION GAP SERPL CALC-SCNC: 10 MMOL/L
AST SERPL-CCNC: 21 U/L
BASOPHILS # BLD AUTO: 0.04 K/UL
BASOPHILS NFR BLD AUTO: 0.4 %
BILIRUB SERPL-MCNC: 0.4 MG/DL
BUN SERPL-MCNC: 22 MG/DL
CALCIUM SERPL-MCNC: 10 MG/DL
CHLORIDE SERPL-SCNC: 101 MMOL/L
CO2 SERPL-SCNC: 25 MMOL/L
CREAT SERPL-MCNC: 1.21 MG/DL
EGFR: 60 ML/MIN/1.73M2
EOSINOPHIL # BLD AUTO: 0.19 K/UL
EOSINOPHIL NFR BLD AUTO: 2 %
FOLATE SERPL-MCNC: 19.3 NG/ML
GLUCOSE SERPL-MCNC: 92 MG/DL
HCT VFR BLD CALC: 44.9 %
HGB BLD-MCNC: 14.5 G/DL
IMM GRANULOCYTES NFR BLD AUTO: 0.5 %
LYMPHOCYTES # BLD AUTO: 1.34 K/UL
LYMPHOCYTES NFR BLD AUTO: 14.1 %
MAGNESIUM SERPL-MCNC: 2.3 MG/DL
MAN DIFF?: NORMAL
MCHC RBC-ENTMCNC: 32.3 GM/DL
MCHC RBC-ENTMCNC: 32.5 PG
MCV RBC AUTO: 100.7 FL
MONOCYTES # BLD AUTO: 0.99 K/UL
MONOCYTES NFR BLD AUTO: 10.4 %
NEUTROPHILS # BLD AUTO: 6.9 K/UL
NEUTROPHILS NFR BLD AUTO: 72.6 %
PLATELET # BLD AUTO: 241 K/UL
POTASSIUM SERPL-SCNC: 4.3 MMOL/L
PROT SERPL-MCNC: 7.7 G/DL
RBC # BLD: 4.46 M/UL
RBC # FLD: 13.3 %
SODIUM SERPL-SCNC: 136 MMOL/L
T4 FREE SERPL-MCNC: 1.2 NG/DL
TSH SERPL-ACNC: 2.93 UIU/ML
VIT B12 SERPL-MCNC: 491 PG/ML
WBC # FLD AUTO: 9.51 K/UL

## 2022-09-23 NOTE — PHYSICAL EXAM
[No Acute Distress] : no acute distress [Well Nourished] : well nourished [Well Developed] : well developed [Well-Appearing] : well-appearing [Normal Sclera/Conjunctiva] : normal sclera/conjunctiva [PERRL] : pupils equal round and reactive to light [EOMI] : extraocular movements intact [Normal Outer Ear/Nose] : the outer ears and nose were normal in appearance [Normal Oropharynx] : the oropharynx was normal [No JVD] : no jugular venous distention [Supple] : supple [No Respiratory Distress] : no respiratory distress  [No Accessory Muscle Use] : no accessory muscle use [Clear to Auscultation] : lungs were clear to auscultation bilaterally [Regular Rhythm] : with a regular rhythm [Normal S1, S2] : normal S1 and S2 [No Murmur] : no murmur heard [No Carotid Bruits] : no carotid bruits [No Varicosities] : no varicosities [Pedal Pulses Present] : the pedal pulses are present [No Edema] : there was no peripheral edema [No Extremity Clubbing/Cyanosis] : no extremity clubbing/cyanosis [Soft] : abdomen soft [Non Tender] : non-tender [Non-distended] : non-distended [Normal Bowel Sounds] : normal bowel sounds [Normal Posterior Cervical Nodes] : no posterior cervical lymphadenopathy [Normal Anterior Cervical Nodes] : no anterior cervical lymphadenopathy [No CVA Tenderness] : no CVA  tenderness [No Spinal Tenderness] : no spinal tenderness [No Joint Swelling] : no joint swelling [Grossly Normal Strength/Tone] : grossly normal strength/tone [No Rash] : no rash [Coordination Grossly Intact] : coordination grossly intact [No Focal Deficits] : no focal deficits [Normal Gait] : normal gait [Normal Affect] : the affect was normal [Alert and Oriented x3] : oriented to person, place, and time [de-identified] : erin

## 2022-09-23 NOTE — REVIEW OF SYSTEMS
[Dizziness] : dizziness [Negative] : Heme/Lymph [Headache] : no headache [Fainting] : no fainting [Confusion] : no confusion [Unsteady Walk] : no ataxia [Memory Loss] : no memory loss

## 2022-09-23 NOTE — HISTORY OF PRESENT ILLNESS
[FreeTextEntry8] : CC: dizziness\par hpi: 83 y/o male with a pmhx of HTN, sinus bradycardia, vertigo comes to office complaining of ongoing dizziness. Patient says about 1 month he got a very had vertigo attack and symptoms although somewhat improved still remain. He looked meclizine which he typically does but symptoms remain. He went to see his neurologist Dr Reza who wanted cardiology to reevaluate if bradycardia causing dizziness. \par Dizziness is not really a spinning sensation any longer. Denies focal weakness, vision changes, dysarthria\par Denies headache but does think he hit his head while getting out of the car a few weeks ago\par Denies chest pain, sob, enriquez, dizziness, palpitations, LE swelling, syncope, n/v, \par \par \par 2 weeks ago got veritgo, meclziine, went to neuro Dr. Reza

## 2022-09-24 ENCOUNTER — OUTPATIENT (OUTPATIENT)
Dept: OUTPATIENT SERVICES | Facility: HOSPITAL | Age: 82
LOS: 1 days | End: 2022-09-24
Payer: MEDICARE

## 2022-09-24 ENCOUNTER — RESULT REVIEW (OUTPATIENT)
Age: 82
End: 2022-09-24

## 2022-09-24 ENCOUNTER — APPOINTMENT (OUTPATIENT)
Dept: CT IMAGING | Facility: CLINIC | Age: 82
End: 2022-09-24

## 2022-09-24 DIAGNOSIS — Z00.8 ENCOUNTER FOR OTHER GENERAL EXAMINATION: ICD-10-CM

## 2022-09-24 PROCEDURE — 70450 CT HEAD/BRAIN W/O DYE: CPT

## 2022-09-24 PROCEDURE — 70450 CT HEAD/BRAIN W/O DYE: CPT | Mod: 26

## 2022-10-03 ENCOUNTER — APPOINTMENT (OUTPATIENT)
Dept: INTERNAL MEDICINE | Facility: CLINIC | Age: 82
End: 2022-10-03

## 2022-10-03 VITALS
WEIGHT: 149 LBS | HEIGHT: 70 IN | SYSTOLIC BLOOD PRESSURE: 155 MMHG | DIASTOLIC BLOOD PRESSURE: 74 MMHG | HEART RATE: 56 BPM | BODY MASS INDEX: 21.33 KG/M2

## 2022-10-03 DIAGNOSIS — Z23 ENCOUNTER FOR IMMUNIZATION: ICD-10-CM

## 2022-10-03 DIAGNOSIS — R00.1 BRADYCARDIA, UNSPECIFIED: ICD-10-CM

## 2022-10-03 PROCEDURE — 90662 IIV NO PRSV INCREASED AG IM: CPT

## 2022-10-03 PROCEDURE — G0008: CPT

## 2022-10-04 NOTE — PHYSICAL EXAM
[No Acute Distress] : no acute distress [Well Nourished] : well nourished [PERRL] : pupils equal round and reactive to light [No JVD] : no jugular venous distention [No Lymphadenopathy] : no lymphadenopathy [No Respiratory Distress] : no respiratory distress  [No Accessory Muscle Use] : no accessory muscle use [Regular Rhythm] : with a regular rhythm [No Carotid Bruits] : no carotid bruits [Soft] : abdomen soft [No HSM] : no HSM [Speech Grossly Normal] : speech grossly normal [Memory Grossly Normal] : memory grossly normal

## 2022-10-04 NOTE — HISTORY OF PRESENT ILLNESS
[FreeTextEntry8] : Patient has two problems that he wanted to come in today to discuss.\par \par 1. Patient is lightheaded.  He has had vertigo for many many years.  He saw a neuro otologist Dr. Reza who recommended that he discuss it with his cardiologist.  The lightheadedness is unchanged in its nature and has been a recurrent problem for at least 10 years.  He was referred to another internist who recommended that the patient have a EP study to determine whether he has conduction system disease.  The patient was here to get my input into this.  I have reviewed this patient's cardiograms for the last 12 years which have always showed a sinus bradycardia in the 50s.  I have reviewed his most recent note from Dr. Cristobal and reviewed the Holter monitor which was done within the last couple months.  I also reviewed his recent echocardiogram.  The patient denies any symptoms of syncope or near syncope.  In general he feels pretty good.\par Please see below for further discussion.\par \par 2.  The patient has also had chronic neck pain for which she saw her Dr. Junior.  The discomfort he gets is intermittent and is generally helped with mild over-the-counter medication.  He relates that Dr. Junior suggested a cervical fusion with an anterior approach.  The patient is apprehensive about this and has come to discuss my thoughts on whether he should have the procedure.  As a mention to the patient I am not an orthopedist or spine surgeon or neurologist but would be happy to review his x-rays and give him my thoughts as his primary care physician.  I reviewed the pertinent test which I will discuss further below.

## 2022-10-04 NOTE — PLAN
[FreeTextEntry1] : 1.  Decision to have an EP study:\par I discussed with the patient the fact that I am not a cardiologist and that he should ultimately discuss this with Dr. Gomez.  My obdulio opinion is that he has no indication to have an EP study and that he has had a persistent sinus bradycardia as far back as I can check.  In addition the patient has had no symptoms of syncope or near syncope.  It was discussed with the patient that an EP study as an invasive procedure and there are some side effects.  The patient said that he understood and that he did not want to pursue seeing an EP specialist for their opinion at this point.\par \par 2.  The patient has significant disease on MRI of the cervical spine.  I reviewed the actual MRI and do see some nerve root impingement and possibly some effacement of the spinal cord.  He does not have significant pain or any demonstrable weakness of his lower extremities.  I had mentioned to the patient that I would be willing to speak to Dr. Junior if he wanted me to.  One other potential approach would be to have a cervical epidural.  I also discussed with the patient the potential use of medications like Neurontin to see if that would give him relief of the pain.  He felt that the pain is not that significant and he did not want to pursue medication at this time.\par \par I have reviewed the B12 folic acid CBC CMP and thyroid function tests from September 22 and they all seem to be fine.\par \par I have spent a total of 55 minutes both face-to-face and non-face-to-face time on the day of the visit.

## 2022-11-14 ENCOUNTER — APPOINTMENT (OUTPATIENT)
Dept: INTERNAL MEDICINE | Facility: CLINIC | Age: 82
End: 2022-11-14

## 2022-11-14 VITALS
SYSTOLIC BLOOD PRESSURE: 167 MMHG | BODY MASS INDEX: 21.47 KG/M2 | HEART RATE: 57 BPM | HEIGHT: 70 IN | DIASTOLIC BLOOD PRESSURE: 72 MMHG | WEIGHT: 150 LBS

## 2022-11-14 DIAGNOSIS — M25.529 PAIN IN UNSPECIFIED ELBOW: ICD-10-CM

## 2022-11-14 PROCEDURE — 99213 OFFICE O/P EST LOW 20 MIN: CPT

## 2022-11-15 NOTE — ASSESSMENT
[FreeTextEntry1] : elbow pain: \par start antiinflammatories\par will check xray \par fu with ortho

## 2022-11-15 NOTE — HISTORY OF PRESENT ILLNESS
[FreeTextEntry8] : 82 year old male with c/o Pain in left elbow. It started last week. \par It is so bad he can not sleep. \par He states he tried motrin once and it somewhat helped. \par \par He also is doing PT and does some excercises with rubber bands on his elbow. \par \par \par \par \par \par

## 2022-11-18 ENCOUNTER — OUTPATIENT (OUTPATIENT)
Dept: OUTPATIENT SERVICES | Facility: HOSPITAL | Age: 82
LOS: 1 days | End: 2022-11-18
Payer: MEDICARE

## 2022-11-18 ENCOUNTER — APPOINTMENT (OUTPATIENT)
Dept: RADIOLOGY | Facility: CLINIC | Age: 82
End: 2022-11-18

## 2022-11-18 DIAGNOSIS — Z00.8 ENCOUNTER FOR OTHER GENERAL EXAMINATION: ICD-10-CM

## 2022-11-18 PROCEDURE — 73070 X-RAY EXAM OF ELBOW: CPT

## 2022-11-18 PROCEDURE — 73070 X-RAY EXAM OF ELBOW: CPT | Mod: 26,LT

## 2022-11-23 ENCOUNTER — NON-APPOINTMENT (OUTPATIENT)
Age: 82
End: 2022-11-23

## 2022-12-27 ENCOUNTER — LABORATORY RESULT (OUTPATIENT)
Age: 82
End: 2022-12-27

## 2022-12-27 ENCOUNTER — APPOINTMENT (OUTPATIENT)
Dept: INTERNAL MEDICINE | Facility: CLINIC | Age: 82
End: 2022-12-27

## 2022-12-27 VITALS — OXYGEN SATURATION: 96 % | HEART RATE: 56 BPM | SYSTOLIC BLOOD PRESSURE: 151 MMHG | DIASTOLIC BLOOD PRESSURE: 64 MMHG

## 2022-12-27 VITALS — HEIGHT: 70 IN | BODY MASS INDEX: 20.76 KG/M2 | WEIGHT: 145 LBS

## 2022-12-27 PROCEDURE — 36415 COLL VENOUS BLD VENIPUNCTURE: CPT | Mod: 59

## 2022-12-27 PROCEDURE — G0439: CPT

## 2022-12-27 RX ORDER — MECLIZINE HYDROCHLORIDE 25 MG/1
25 TABLET ORAL 3 TIMES DAILY
Qty: 30 | Refills: 0 | Status: DISCONTINUED | COMMUNITY
Start: 2017-11-30 | End: 2022-12-27

## 2022-12-27 RX ORDER — MUPIROCIN 20 MG/G
2 OINTMENT TOPICAL
Qty: 22 | Refills: 0 | Status: DISCONTINUED | COMMUNITY
Start: 2022-12-15

## 2022-12-27 NOTE — ASSESSMENT
[FreeTextEntry1] : This is a 82 year -old  M who was here today for an annual preventative physical.  A history, relevant physical examination and Health Risk Assessment were performed.  .  \par \par \par Cognitive Issues  __x_No   ___Yes\par Fall Risk                __x_No   ___Yes\par \par The following recommendations were made:\par Exercise regularly.\par Maintain a healthy diet.\par Yearly Flu shot.\par Return in 6 months\par _____________________________________\par

## 2022-12-27 NOTE — HISTORY OF PRESENT ILLNESS
[de-identified] : This is annual Preventative examination for this 82 year year old White male.  The patient has been generally feeling well with no new complaints except that he feels like he has a very low energy level.. A complete evaluation of their current medication was reviewed with them including OTC medication .\par \par Chronic medical problems for which they are being followed by a physician are:\par Hypertension\par    \par \par Exercises regularly:yes\par \par .\par \par \par

## 2022-12-27 NOTE — HEALTH RISK ASSESSMENT
[Good] : ~his/her~  mood as  good [Never] : Never [Yes] : Yes [2 - 4 times a month (2 pts)] : 2-4 times a month (2 points) [PHQ-2 Negative - No further assessment needed] : PHQ-2 Negative - No further assessment needed [None] : None [] :  [Fully functional (bathing, dressing, toileting, transferring, walking, feeding)] : Fully functional (bathing, dressing, toileting, transferring, walking, feeding) [Fully functional (using the telephone, shopping, preparing meals, housekeeping, doing laundry, using] : Fully functional and needs no help or supervision to perform IADLs (using the telephone, shopping, preparing meals, housekeeping, doing laundry, using transportation, managing medications and managing finances) [Reports changes in hearing] : Reports changes in hearing [Smoke Detector] : smoke detector [Carbon Monoxide Detector] : carbon monoxide detector [With Patient/Caregiver] : , with patient/caregiver [Name: ___] : Health Care Proxy's Name: [unfilled]  [Relationship: ___] : Relationship: [unfilled] [Change in mental status noted] : No change in mental status noted [Language] : denies difficulty with language [Reports changes in vision] : Reports no changes in vision [Reports changes in dental health] : Reports no changes in dental health [AdvancecareDate] : 12/22

## 2023-01-09 LAB
ALBUMIN SERPL ELPH-MCNC: 4.5 G/DL
ALP BLD-CCNC: 134 U/L
ALT SERPL-CCNC: 25 U/L
ANION GAP SERPL CALC-SCNC: 13 MMOL/L
APPEARANCE: CLEAR
AST SERPL-CCNC: 22 U/L
BACTERIA: NEGATIVE
BASOPHILS # BLD AUTO: 0.04 K/UL
BASOPHILS NFR BLD AUTO: 0.5 %
BILIRUB SERPL-MCNC: 0.4 MG/DL
BILIRUBIN URINE: NEGATIVE
BLOOD URINE: NEGATIVE
BUN SERPL-MCNC: 21 MG/DL
CALCIUM SERPL-MCNC: 10.1 MG/DL
CHLORIDE SERPL-SCNC: 101 MMOL/L
CHOLEST SERPL-MCNC: 176 MG/DL
CO2 SERPL-SCNC: 25 MMOL/L
COLOR: YELLOW
CREAT SERPL-MCNC: 1.16 MG/DL
EGFR: 63 ML/MIN/1.73M2
EOSINOPHIL # BLD AUTO: 0.18 K/UL
EOSINOPHIL NFR BLD AUTO: 2.3 %
ESTIMATED AVERAGE GLUCOSE: 105 MG/DL
FERRITIN SERPL-MCNC: 331 NG/ML
GLUCOSE QUALITATIVE U: NEGATIVE
GLUCOSE SERPL-MCNC: 97 MG/DL
HBA1C MFR BLD HPLC: 5.3 %
HCT VFR BLD CALC: 45.2 %
HDLC SERPL-MCNC: 63 MG/DL
HGB BLD-MCNC: 14.6 G/DL
HYALINE CASTS: 8 /LPF
IMM GRANULOCYTES NFR BLD AUTO: 0.3 %
IRON SATN MFR SERPL: 42 %
IRON SERPL-MCNC: 109 UG/DL
KETONES URINE: NORMAL
LDLC SERPL CALC-MCNC: 96 MG/DL
LEUKOCYTE ESTERASE URINE: ABNORMAL
LYMPHOCYTES # BLD AUTO: 1.59 K/UL
LYMPHOCYTES NFR BLD AUTO: 20.6 %
MAN DIFF?: NORMAL
MCHC RBC-ENTMCNC: 32.3 GM/DL
MCHC RBC-ENTMCNC: 32.8 PG
MCV RBC AUTO: 101.6 FL
MICROSCOPIC-UA: NORMAL
MONOCYTES # BLD AUTO: 0.86 K/UL
MONOCYTES NFR BLD AUTO: 11.1 %
NEUTROPHILS # BLD AUTO: 5.04 K/UL
NEUTROPHILS NFR BLD AUTO: 65.2 %
NITRITE URINE: NEGATIVE
NONHDLC SERPL-MCNC: 113 MG/DL
PH URINE: 6
PLATELET # BLD AUTO: 266 K/UL
POTASSIUM SERPL-SCNC: 4.4 MMOL/L
PROT SERPL-MCNC: 7.5 G/DL
PROTEIN URINE: ABNORMAL
PSA SERPL-MCNC: 8.28 NG/ML
RBC # BLD: 4.45 M/UL
RBC # FLD: 13.2 %
RED BLOOD CELLS URINE: 2 /HPF
SODIUM SERPL-SCNC: 139 MMOL/L
SPECIFIC GRAVITY URINE: 1.03
SQUAMOUS EPITHELIAL CELLS: 1 /HPF
T4 SERPL-MCNC: 6.6 UG/DL
TESTOST FREE SERPL-MCNC: 10.3 PG/ML
TESTOST SERPL-MCNC: 742 NG/DL
TIBC SERPL-MCNC: 260 UG/DL
TRIGL SERPL-MCNC: 87 MG/DL
TSH SERPL-ACNC: 3.85 UIU/ML
UIBC SERPL-MCNC: 152 UG/DL
URATE SERPL-MCNC: 6.9 MG/DL
URINE COMMENTS: NORMAL
UROBILINOGEN URINE: ABNORMAL
VIT B12 SERPL-MCNC: 431 PG/ML
WBC # FLD AUTO: 7.73 K/UL
WHITE BLOOD CELLS URINE: 10 /HPF

## 2023-01-13 ENCOUNTER — OUTPATIENT (OUTPATIENT)
Dept: OUTPATIENT SERVICES | Facility: HOSPITAL | Age: 83
LOS: 1 days | End: 2023-01-13
Payer: MEDICARE

## 2023-01-13 ENCOUNTER — APPOINTMENT (OUTPATIENT)
Dept: ULTRASOUND IMAGING | Facility: CLINIC | Age: 83
End: 2023-01-13
Payer: MEDICARE

## 2023-01-13 DIAGNOSIS — Z00.00 ENCOUNTER FOR GENERAL ADULT MEDICAL EXAMINATION WITHOUT ABNORMAL FINDINGS: ICD-10-CM

## 2023-01-13 PROCEDURE — 93880 EXTRACRANIAL BILAT STUDY: CPT

## 2023-01-13 PROCEDURE — 93880 EXTRACRANIAL BILAT STUDY: CPT | Mod: 26

## 2023-01-17 ENCOUNTER — RESULT REVIEW (OUTPATIENT)
Age: 83
End: 2023-01-17

## 2023-01-17 ENCOUNTER — APPOINTMENT (OUTPATIENT)
Dept: INTERNAL MEDICINE | Facility: CLINIC | Age: 83
End: 2023-01-17
Payer: MEDICARE

## 2023-01-17 VITALS
HEIGHT: 70 IN | BODY MASS INDEX: 21.62 KG/M2 | HEART RATE: 54 BPM | SYSTOLIC BLOOD PRESSURE: 160 MMHG | WEIGHT: 151 LBS | DIASTOLIC BLOOD PRESSURE: 79 MMHG | OXYGEN SATURATION: 99 %

## 2023-01-17 DIAGNOSIS — K57.92 DIVERTICULITIS OF INTESTINE, PART UNSPECIFIED, W/OUT PERFORATION OR ABSCESS W/OUT BLEEDING: ICD-10-CM

## 2023-01-17 PROCEDURE — 99214 OFFICE O/P EST MOD 30 MIN: CPT

## 2023-01-17 RX ORDER — METRONIDAZOLE 250 MG/1
250 TABLET ORAL
Qty: 30 | Refills: 0 | Status: ACTIVE | COMMUNITY
Start: 1900-01-01 | End: 1900-01-01

## 2023-01-17 NOTE — ASSESSMENT
[FreeTextEntry1] : Acute diverticulitis: I have advised the patient to add Flagyl 2050 mg 3 times a day to the regimen.  He was warned not to ingest any alcohol while on the Flagyl.  I have also asked the patient to get a CT of the abdomen pelvis tomorrow morning.  I am concerned that the patient still has some tenderness and the fact that he has questionable increased urinary symptoms.  It is unlikely that the inflammation is irritating the bladder, but it is still in the realm of possibilities.  I will also get a urinalysis.  I reviewed Dr. Singleton's note from March 2022.\par \par I had also spoken to the patient about his mild atherosclerosis of his carotid arteries.  The plaque described is minimal for his age.  I did discuss with him about starting a low-dose statin after the diverticulitis is resolved.  He is very much against taking any additional medication.\par \par I have spent a total of 35 minutes on the day of the visit both face to face and non-face to face with the patient.\par

## 2023-01-17 NOTE — PHYSICAL EXAM
[de-identified] : There is mild tenderness in the left lower quadrant and in the suprapubic area.

## 2023-01-17 NOTE — HISTORY OF PRESENT ILLNESS
[FreeTextEntry8] : Patient developed LLQ pain with more generalized abdominal pain. Started Amoxicillin. on Saturday. Feeling better today.  However he has some increased discomfort in the upper abdomen.  The patient has not altered his diet.  He is mildly constipated.  He seems to have some symptoms of increased urination but this may not be different than his normal routine.  There is no history of fever although he had some chills initially.

## 2023-01-18 ENCOUNTER — APPOINTMENT (OUTPATIENT)
Dept: CT IMAGING | Facility: IMAGING CENTER | Age: 83
End: 2023-01-18
Payer: MEDICARE

## 2023-01-18 ENCOUNTER — INPATIENT (INPATIENT)
Facility: HOSPITAL | Age: 83
LOS: 1 days | Discharge: HOME | End: 2023-01-20
Attending: SURGERY | Admitting: SURGERY
Payer: MEDICARE

## 2023-01-18 ENCOUNTER — OUTPATIENT (OUTPATIENT)
Dept: OUTPATIENT SERVICES | Facility: HOSPITAL | Age: 83
LOS: 1 days | End: 2023-01-18
Payer: MEDICARE

## 2023-01-18 VITALS
OXYGEN SATURATION: 100 % | HEART RATE: 58 BPM | DIASTOLIC BLOOD PRESSURE: 63 MMHG | SYSTOLIC BLOOD PRESSURE: 149 MMHG | RESPIRATION RATE: 17 BRPM | TEMPERATURE: 98 F

## 2023-01-18 DIAGNOSIS — K57.92 DIVERTICULITIS OF INTESTINE, PART UNSPECIFIED, WITHOUT PERFORATION OR ABSCESS WITHOUT BLEEDING: ICD-10-CM

## 2023-01-18 DIAGNOSIS — K63.1 PERFORATION OF INTESTINE (NONTRAUMATIC): ICD-10-CM

## 2023-01-18 LAB
ALBUMIN SERPL ELPH-MCNC: 4 G/DL — SIGNIFICANT CHANGE UP (ref 3.3–5)
ALP SERPL-CCNC: 163 U/L — HIGH (ref 40–120)
ALT FLD-CCNC: 53 U/L — HIGH (ref 4–41)
ANION GAP SERPL CALC-SCNC: 10 MMOL/L — SIGNIFICANT CHANGE UP (ref 7–14)
APPEARANCE UR: CLEAR — SIGNIFICANT CHANGE UP
APPEARANCE: CLEAR
APTT BLD: 27.9 SEC — SIGNIFICANT CHANGE UP (ref 27–36.3)
AST SERPL-CCNC: 32 U/L — SIGNIFICANT CHANGE UP (ref 4–40)
BACTERIA: NEGATIVE
BASOPHILS # BLD AUTO: 0.03 K/UL — SIGNIFICANT CHANGE UP (ref 0–0.2)
BASOPHILS NFR BLD AUTO: 0.4 % — SIGNIFICANT CHANGE UP (ref 0–2)
BILIRUB SERPL-MCNC: 0.3 MG/DL — SIGNIFICANT CHANGE UP (ref 0.2–1.2)
BILIRUB UR-MCNC: NEGATIVE — SIGNIFICANT CHANGE UP
BILIRUBIN URINE: NEGATIVE
BLD GP AB SCN SERPL QL: NEGATIVE — SIGNIFICANT CHANGE UP
BLOOD GAS VENOUS COMPREHENSIVE RESULT: SIGNIFICANT CHANGE UP
BLOOD URINE: NEGATIVE
BUN SERPL-MCNC: 19 MG/DL — SIGNIFICANT CHANGE UP (ref 7–23)
CALCIUM SERPL-MCNC: 9.8 MG/DL — SIGNIFICANT CHANGE UP (ref 8.4–10.5)
CHLORIDE SERPL-SCNC: 101 MMOL/L — SIGNIFICANT CHANGE UP (ref 98–107)
CO2 SERPL-SCNC: 26 MMOL/L — SIGNIFICANT CHANGE UP (ref 22–31)
COLOR SPEC: YELLOW — SIGNIFICANT CHANGE UP
COLOR: NORMAL
CREAT SERPL-MCNC: 0.97 MG/DL — SIGNIFICANT CHANGE UP (ref 0.5–1.3)
DIFF PNL FLD: NEGATIVE — SIGNIFICANT CHANGE UP
EGFR: 78 ML/MIN/1.73M2 — SIGNIFICANT CHANGE UP
EOSINOPHIL # BLD AUTO: 0.04 K/UL — SIGNIFICANT CHANGE UP (ref 0–0.5)
EOSINOPHIL NFR BLD AUTO: 0.6 % — SIGNIFICANT CHANGE UP (ref 0–6)
FLUAV AG NPH QL: SIGNIFICANT CHANGE UP
FLUBV AG NPH QL: SIGNIFICANT CHANGE UP
GLUCOSE QUALITATIVE U: NEGATIVE
GLUCOSE SERPL-MCNC: 100 MG/DL — HIGH (ref 70–99)
GLUCOSE UR QL: NEGATIVE — SIGNIFICANT CHANGE UP
HCT VFR BLD CALC: 42.3 % — SIGNIFICANT CHANGE UP (ref 39–50)
HGB BLD-MCNC: 13.9 G/DL — SIGNIFICANT CHANGE UP (ref 13–17)
HYALINE CASTS: 0 /LPF
IANC: 5.44 K/UL — SIGNIFICANT CHANGE UP (ref 1.8–7.4)
IMM GRANULOCYTES NFR BLD AUTO: 1.2 % — HIGH (ref 0–0.9)
INR BLD: 1.02 RATIO — SIGNIFICANT CHANGE UP (ref 0.88–1.16)
KETONES UR-MCNC: NEGATIVE — SIGNIFICANT CHANGE UP
KETONES URINE: NEGATIVE
LEUKOCYTE ESTERASE UR-ACNC: NEGATIVE — SIGNIFICANT CHANGE UP
LEUKOCYTE ESTERASE URINE: ABNORMAL
LIDOCAIN IGE QN: 33 U/L — SIGNIFICANT CHANGE UP (ref 7–60)
LYMPHOCYTES # BLD AUTO: 0.82 K/UL — LOW (ref 1–3.3)
LYMPHOCYTES # BLD AUTO: 11.3 % — LOW (ref 13–44)
MCHC RBC-ENTMCNC: 31.4 PG — SIGNIFICANT CHANGE UP (ref 27–34)
MCHC RBC-ENTMCNC: 32.9 GM/DL — SIGNIFICANT CHANGE UP (ref 32–36)
MCV RBC AUTO: 95.5 FL — SIGNIFICANT CHANGE UP (ref 80–100)
MICROSCOPIC-UA: NORMAL
MONOCYTES # BLD AUTO: 0.83 K/UL — SIGNIFICANT CHANGE UP (ref 0–0.9)
MONOCYTES NFR BLD AUTO: 11.4 % — SIGNIFICANT CHANGE UP (ref 2–14)
NEUTROPHILS # BLD AUTO: 5.44 K/UL — SIGNIFICANT CHANGE UP (ref 1.8–7.4)
NEUTROPHILS NFR BLD AUTO: 75.1 % — SIGNIFICANT CHANGE UP (ref 43–77)
NITRITE UR-MCNC: NEGATIVE — SIGNIFICANT CHANGE UP
NITRITE URINE: NEGATIVE
NRBC # BLD: 0 /100 WBCS — SIGNIFICANT CHANGE UP (ref 0–0)
NRBC # FLD: 0 K/UL — SIGNIFICANT CHANGE UP (ref 0–0)
PH UR: 6.5 — SIGNIFICANT CHANGE UP (ref 5–8)
PH URINE: 6.5
PLATELET # BLD AUTO: 230 K/UL — SIGNIFICANT CHANGE UP (ref 150–400)
POTASSIUM SERPL-MCNC: 4.4 MMOL/L — SIGNIFICANT CHANGE UP (ref 3.5–5.3)
POTASSIUM SERPL-SCNC: 4.4 MMOL/L — SIGNIFICANT CHANGE UP (ref 3.5–5.3)
PROT SERPL-MCNC: 7.5 G/DL — SIGNIFICANT CHANGE UP (ref 6–8.3)
PROT UR-MCNC: ABNORMAL
PROTEIN URINE: NEGATIVE
PROTHROM AB SERPL-ACNC: 11.8 SEC — SIGNIFICANT CHANGE UP (ref 10.5–13.4)
RBC # BLD: 4.43 M/UL — SIGNIFICANT CHANGE UP (ref 4.2–5.8)
RBC # FLD: 12.9 % — SIGNIFICANT CHANGE UP (ref 10.3–14.5)
RED BLOOD CELLS URINE: 1 /HPF
RH IG SCN BLD-IMP: POSITIVE — SIGNIFICANT CHANGE UP
RSV RNA NPH QL NAA+NON-PROBE: SIGNIFICANT CHANGE UP
SARS-COV-2 RNA SPEC QL NAA+PROBE: SIGNIFICANT CHANGE UP
SODIUM SERPL-SCNC: 137 MMOL/L — SIGNIFICANT CHANGE UP (ref 135–145)
SP GR SPEC: >1.05 (ref 1.01–1.05)
SPECIFIC GRAVITY URINE: 1.01
SQUAMOUS EPITHELIAL CELLS: 0 /HPF
UROBILINOGEN FLD QL: ABNORMAL
UROBILINOGEN URINE: NORMAL
WBC # BLD: 7.25 K/UL — SIGNIFICANT CHANGE UP (ref 3.8–10.5)
WBC # FLD AUTO: 7.25 K/UL — SIGNIFICANT CHANGE UP (ref 3.8–10.5)
WHITE BLOOD CELLS URINE: 2 /HPF

## 2023-01-18 PROCEDURE — 99285 EMERGENCY DEPT VISIT HI MDM: CPT | Mod: FS

## 2023-01-18 PROCEDURE — 74177 CT ABD & PELVIS W/CONTRAST: CPT

## 2023-01-18 PROCEDURE — 74177 CT ABD & PELVIS W/CONTRAST: CPT | Mod: 26,MH

## 2023-01-18 RX ORDER — FINASTERIDE 5 MG/1
5 TABLET, FILM COATED ORAL DAILY
Refills: 0 | Status: DISCONTINUED | OUTPATIENT
Start: 2023-01-18 | End: 2023-01-20

## 2023-01-18 RX ORDER — LOSARTAN POTASSIUM 100 MG/1
25 TABLET, FILM COATED ORAL EVERY 24 HOURS
Refills: 0 | Status: DISCONTINUED | OUTPATIENT
Start: 2023-01-18 | End: 2023-01-20

## 2023-01-18 RX ORDER — CIPROFLOXACIN LACTATE 400MG/40ML
VIAL (ML) INTRAVENOUS
Refills: 0 | Status: DISCONTINUED | OUTPATIENT
Start: 2023-01-18 | End: 2023-01-20

## 2023-01-18 RX ORDER — PIPERACILLIN AND TAZOBACTAM 4; .5 G/20ML; G/20ML
3.38 INJECTION, POWDER, LYOPHILIZED, FOR SOLUTION INTRAVENOUS ONCE
Refills: 0 | Status: COMPLETED | OUTPATIENT
Start: 2023-01-18 | End: 2023-01-18

## 2023-01-18 RX ORDER — AMLODIPINE BESYLATE 2.5 MG/1
2.5 TABLET ORAL EVERY 24 HOURS
Refills: 0 | Status: DISCONTINUED | OUTPATIENT
Start: 2023-01-18 | End: 2023-01-20

## 2023-01-18 RX ORDER — METRONIDAZOLE 500 MG
500 TABLET ORAL ONCE
Refills: 0 | Status: COMPLETED | OUTPATIENT
Start: 2023-01-18 | End: 2023-01-18

## 2023-01-18 RX ORDER — MECLIZINE HCL 12.5 MG
25 TABLET ORAL EVERY 24 HOURS
Refills: 0 | Status: DISCONTINUED | OUTPATIENT
Start: 2023-01-18 | End: 2023-01-20

## 2023-01-18 RX ORDER — ACETAMINOPHEN 500 MG
1000 TABLET ORAL EVERY 6 HOURS
Refills: 0 | Status: COMPLETED | OUTPATIENT
Start: 2023-01-18 | End: 2023-01-19

## 2023-01-18 RX ORDER — METRONIDAZOLE 500 MG
500 TABLET ORAL EVERY 8 HOURS
Refills: 0 | Status: DISCONTINUED | OUTPATIENT
Start: 2023-01-18 | End: 2023-01-20

## 2023-01-18 RX ORDER — CIPROFLOXACIN LACTATE 400MG/40ML
400 VIAL (ML) INTRAVENOUS ONCE
Refills: 0 | Status: COMPLETED | OUTPATIENT
Start: 2023-01-18 | End: 2023-01-18

## 2023-01-18 RX ORDER — ENOXAPARIN SODIUM 100 MG/ML
40 INJECTION SUBCUTANEOUS EVERY 24 HOURS
Refills: 0 | Status: DISCONTINUED | OUTPATIENT
Start: 2023-01-18 | End: 2023-01-20

## 2023-01-18 RX ORDER — CIPROFLOXACIN LACTATE 400MG/40ML
400 VIAL (ML) INTRAVENOUS EVERY 12 HOURS
Refills: 0 | Status: DISCONTINUED | OUTPATIENT
Start: 2023-01-19 | End: 2023-01-20

## 2023-01-18 RX ORDER — METRONIDAZOLE 500 MG
TABLET ORAL
Refills: 0 | Status: DISCONTINUED | OUTPATIENT
Start: 2023-01-18 | End: 2023-01-20

## 2023-01-18 RX ORDER — LATANOPROST 0.05 MG/ML
1 SOLUTION/ DROPS OPHTHALMIC; TOPICAL AT BEDTIME
Refills: 0 | Status: DISCONTINUED | OUTPATIENT
Start: 2023-01-18 | End: 2023-01-20

## 2023-01-18 RX ORDER — SODIUM CHLORIDE 9 MG/ML
1000 INJECTION, SOLUTION INTRAVENOUS
Refills: 0 | Status: DISCONTINUED | OUTPATIENT
Start: 2023-01-18 | End: 2023-01-19

## 2023-01-18 RX ORDER — PANTOPRAZOLE SODIUM 20 MG/1
40 TABLET, DELAYED RELEASE ORAL
Refills: 0 | Status: DISCONTINUED | OUTPATIENT
Start: 2023-01-18 | End: 2023-01-20

## 2023-01-18 RX ADMIN — PIPERACILLIN AND TAZOBACTAM 200 GRAM(S): 4; .5 INJECTION, POWDER, LYOPHILIZED, FOR SOLUTION INTRAVENOUS at 12:12

## 2023-01-18 RX ADMIN — SODIUM CHLORIDE 100 MILLILITER(S): 9 INJECTION, SOLUTION INTRAVENOUS at 21:22

## 2023-01-18 RX ADMIN — Medication 200 MILLIGRAM(S): at 16:45

## 2023-01-18 RX ADMIN — Medication 100 MILLIGRAM(S): at 23:34

## 2023-01-18 RX ADMIN — Medication 100 MILLIGRAM(S): at 15:37

## 2023-01-18 NOTE — ED PROVIDER NOTE - OBJECTIVE STATEMENT
82-year-old male with past medical history HTN, diverticulitis sent to the ER with CT concerning for bowel perforation.  Patient states 3 days ago he developed left lower quadrant pain which then traveled to the upper abdomen.  Patient started taking amoxicillin 2 days ago and saw his GI doctor yesterday and had a CT scan ordered.  Patient went for CT scan this morning and was called within 30 minutes, told he likely had a perforated bowel and sent to the ER for further work-up.  Pt does report chills prior to starting antibiotic. Patient denies fever, nausea, vomiting, diarrhea, bloody bowel movement, urinary complaints, chest pain, shortness of breath, recent travel or illness or any other concerns.

## 2023-01-18 NOTE — H&P ADULT - NSHPLABSRESULTS_GEN_ALL_CORE
13.9   7.25  )-----------( 230      ( 18 Jan 2023 12:15 )             42.3   01-18    137  |  101  |  19  ----------------------------<  100<H>  4.4   |  26  |  0.97    Ca    9.8      18 Jan 2023 12:15    TPro  7.5  /  Alb  4.0  /  TBili  0.3  /  DBili  x   /  AST  32  /  ALT  53<H>  /  AlkPhos  163<H>  01-18    < from: CT Abdomen and Pelvis w/ Oral Cont and w/ IV Cont (01.18.23 @ 08:49) >      EXAM: [id number] - CT ABDOMEN AND PELVIS OC IC  - ORDERED BY: PADMINI SANCHES      PROCEDURE DATE:  01/18/2023           INTERPRETATION:  CLINICAL INFORMATION: Left lower quadrant pain    COMPARISON: 8/15    CONTRAST/COMPLICATIONS:  IV Contrast: Omnipaque 350  90 cc administered   10 cc discarded  Oral Contrast: Smoothie Readi-Cat 2  Complications: None reported at time of study completion    PROCEDURE:  CT of the Abdomen and Pelvis was performed.  Sagittal and coronal reformats were performed.    FINDINGS:  LOWER CHEST: Within normal limits.    LIVER: Within normal limits.  BILE DUCTS: Normal caliber.  GALLBLADDER: Within normal limits.  SPLEEN: Small hypodensity spleen likely stable from previous exam  PANCREAS: Within normal limits.  ADRENALS: Within normal limits.  KIDNEYS/URETERS: Within normal limits.    BLADDER: No intraluminal abnormality can be seen. Stated below the is a   extraluminal air collection which is intimately associated and causing   tethering of soft tissue thickening along the dome of the urinary bladder   without definitive fistulization.  REPRODUCTIVE ORGANS: Prostate is enlarged.    BOWEL: Extensive diverticulosis. There appears be an extraluminal   thick-walled air collections seen measuring 6.1 x 6.8 x 6.8 cm in size   which is intimately associated with the anterior margin of the sigmoid   colon. Large amount of air is identified within this collection without   fluid level. Minimally bulges and touches the anterior peritoneal surface   without anterior abdominal wall involvement. There is no evidence of   gross free air. Slight infiltrative changes seen abutting the dome of the   urinary bladder without definitive communication to the bladder. No   definable small bowel abnormality seen  PERITONEUM: No evidence of free fluid can be seen  VESSELS: Within normal limits. Left-sided IVC  RETROPERITONEUM/LYMPH NODES: No lymphadenopathy.  ABDOMINAL WALL: Within normal limits.  BONES: Degenerative changes without lytic or blastic foci.    IMPRESSION:  Patient has a 6.1 x 6.8 x 6.8 cm thick-walled air collection noted   anterior to the sigmoid colon. This is causing significant infiltrative   changes extending to the dome of the urinary bladder without definitive   fistulization. These findings are suspicious for a confined perforation   without significant fluid within the air collection.    Findings were discussed with Dr. Alexander1/18/2023 9:03 AM by Dr. Abreu   with read back confirmation.    --- End of Report ---               RICA ABREU MD; Attending Radiologist   This document has been electronically signed. Jan 18 2023  9:08AM    < end of copied text >

## 2023-01-18 NOTE — ED PROVIDER NOTE - PROGRESS NOTE DETAILS
THEA Dyer: Spoke with surgery will see pt in the ED. THEA Dyer: Surgery recommending admission to . Pt aware of admission.

## 2023-01-18 NOTE — ED PROVIDER NOTE - CLINICAL SUMMARY MEDICAL DECISION MAKING FREE TEXT BOX
82-year-old male with past medical history HTN, diverticulitis sent to the ER with CT concerning for bowel perforation.  Patient states 3 days ago he developed left lower quadrant pain which then traveled to the upper abdomen, stated taking amoxicillin. CT this morning showing concern for confined perforation. On exam pt is well appearing, afebrile, mild LLQ/epigastric tenderness. Plan: cbc/cmp, vbg, pre-ops, IV abx, covid swab, surgery consult.

## 2023-01-18 NOTE — ED PROVIDER NOTE - ABDOMINAL EXAM
Spoke to patient over the phone and scheduled nurse visit for UDS for 8/27/21 at [facility]. soft/tender.../nondistended

## 2023-01-18 NOTE — ED ADULT NURSE NOTE - OBJECTIVE STATEMENT
pt A&ox4, awake and alert coming from out pt CT for possible perforated Mellen. pt was told to come into ED today after receiving CT today. pt has no c/o of pain at this time. pmh:HTN and glaucoma. pt denies Chest pain and SOB. pt denies H/A, Dizziness, lightheadedness, and radiating chest pain. breathing is spontaneous and unlabored. sating 99% on RA.  abdomen is flat, soft, and non tender. pt denies n/v/d/ or fevers or chills. bilateral pedal and radial pulses palpable and strong. right ac 20g IV placed Labs drawn and sent as per ordered. Bed in lowest position, call bell within reach, all other safety and comfort measures provided. awaiting labs results and further orders.

## 2023-01-18 NOTE — H&P ADULT - NSHPPHYSICALEXAM_GEN_ALL_CORE
General: NAD  Neuro: A/Ox4  HEENT: NC/AT  Respiratory: RA, no increased work of breathing  CV: RRR  Abdomen: Soft, Non distended, non tender to palpation without rebound tenderness/guarding/rigidity  Extremities: WWP, w/o gross deformity, BAKER  Skin: intact, w/o breakdown

## 2023-01-18 NOTE — ED ADULT TRIAGE NOTE - CHIEF COMPLAINT QUOTE
Pt sent in from Dr. Barrett, pt had a colonoscopy this morning, and had a call back in the last 30 min's concerned for a perforation in the colon. Endorsing LLQ pain. Denies N/V/D. Sent to be seen by MD Parada. Phx: HTN, glaucoma

## 2023-01-18 NOTE — H&P ADULT - HISTORY OF PRESENT ILLNESS
Mr. Fofana is 82M w PMHx HTN, HLD, BPH, GERD, Vertigo, Glaucoma, and diverticulitis who presents with abdominal pain since Saturday. Started on PO abx Sunday, patient keeps his own supply, however PMD ordered outpatient CT A/P to evaluate abdominal pain. CT A/P 1/18 w 6 x 6 x 6cm thick walled air collection cf contained perforation. Patient denies fevers, chills, nausea, vomiting, chest pain, SOB, diarrhea, hematochezia, hematemesis. Passing flatus and BMs.

## 2023-01-18 NOTE — ED PROVIDER NOTE - NS ED ATTENDING STATEMENT MOD
This was a shared visit with the ANDRY. I reviewed and verified the documentation and independently performed the documented:

## 2023-01-18 NOTE — ED PROVIDER NOTE - ATTENDING APP SHARED VISIT CONTRIBUTION OF CARE
Discharged
82-year-old male with past medical history of hypertension, diverticulitis sent to ED for evaluation of concern for bowel perforation.  Reports left lower quadrant pain radiating to upper abdomen.  Was started on amoxicillin 2 days ago after seeing GI doctor and CT scan was ordered, concern for peripheral valve was sent to the ER.  Patient denies fevers, chest pain, shortness of breath, urinary symptoms, URI symptoms. Well appearing, RRR, CTA BL lungs, abd +ttp A/P Labs, imaging, medicate, reassess

## 2023-01-18 NOTE — H&P ADULT - ASSESSMENT
Mr. Fofana is 82M w PMHx HTN, HLD, BPH, GERD, Vertigo, Glaucoma, and diverticulitis who presents with acute episode of diverticulitis. CT A/P 1/18 w concern for contained perforation given [id number] collection, however imaging more suspicious for large diverticula rather than perforation. Abdominal exam benign, labs wnl, and patient HDS/Afebrile. Will manage non operatively w IV Abx for now.     Plan;   -Admit to A Team Surgery, Dr. Parada   -No acute surgical intervention   -Pain Control: Tylenol and Dilaudid PRN  -HTN: Home anti hypertensives   -Diet: NPO w s/c   -IVF: LR @ 100  -Abx: IV CIPRO/FLAGYL  -Lovenox for chemical VTE ppx   -SCD  -AM labs     Discussed with Attending Surgeon Dr. Hilton and Dr. Annette Valenzuela MD PGY2   A Team   [id number]

## 2023-01-19 LAB
ANION GAP SERPL CALC-SCNC: 9 MMOL/L — SIGNIFICANT CHANGE UP (ref 7–14)
BUN SERPL-MCNC: 13 MG/DL — SIGNIFICANT CHANGE UP (ref 7–23)
CALCIUM SERPL-MCNC: 9.7 MG/DL — SIGNIFICANT CHANGE UP (ref 8.4–10.5)
CHLORIDE SERPL-SCNC: 101 MMOL/L — SIGNIFICANT CHANGE UP (ref 98–107)
CO2 SERPL-SCNC: 24 MMOL/L — SIGNIFICANT CHANGE UP (ref 22–31)
CREAT SERPL-MCNC: 1 MG/DL — SIGNIFICANT CHANGE UP (ref 0.5–1.3)
CULTURE RESULTS: NO GROWTH — SIGNIFICANT CHANGE UP
EGFR: 75 ML/MIN/1.73M2 — SIGNIFICANT CHANGE UP
GLUCOSE SERPL-MCNC: 90 MG/DL — SIGNIFICANT CHANGE UP (ref 70–99)
HCT VFR BLD CALC: 40.7 % — SIGNIFICANT CHANGE UP (ref 39–50)
HGB BLD-MCNC: 13.7 G/DL — SIGNIFICANT CHANGE UP (ref 13–17)
MAGNESIUM SERPL-MCNC: 2.1 MG/DL — SIGNIFICANT CHANGE UP (ref 1.6–2.6)
MCHC RBC-ENTMCNC: 31.6 PG — SIGNIFICANT CHANGE UP (ref 27–34)
MCHC RBC-ENTMCNC: 33.7 GM/DL — SIGNIFICANT CHANGE UP (ref 32–36)
MCV RBC AUTO: 94 FL — SIGNIFICANT CHANGE UP (ref 80–100)
NRBC # BLD: 0 /100 WBCS — SIGNIFICANT CHANGE UP (ref 0–0)
NRBC # FLD: 0 K/UL — SIGNIFICANT CHANGE UP (ref 0–0)
PHOSPHATE SERPL-MCNC: 2.6 MG/DL — SIGNIFICANT CHANGE UP (ref 2.5–4.5)
PLATELET # BLD AUTO: 265 K/UL — SIGNIFICANT CHANGE UP (ref 150–400)
POTASSIUM SERPL-MCNC: 3.9 MMOL/L — SIGNIFICANT CHANGE UP (ref 3.5–5.3)
POTASSIUM SERPL-SCNC: 3.9 MMOL/L — SIGNIFICANT CHANGE UP (ref 3.5–5.3)
RBC # BLD: 4.33 M/UL — SIGNIFICANT CHANGE UP (ref 4.2–5.8)
RBC # FLD: 12.8 % — SIGNIFICANT CHANGE UP (ref 10.3–14.5)
RH IG SCN BLD-IMP: POSITIVE — SIGNIFICANT CHANGE UP
SODIUM SERPL-SCNC: 134 MMOL/L — LOW (ref 135–145)
SPECIMEN SOURCE: SIGNIFICANT CHANGE UP
WBC # BLD: 6.84 K/UL — SIGNIFICANT CHANGE UP (ref 3.8–10.5)
WBC # FLD AUTO: 6.84 K/UL — SIGNIFICANT CHANGE UP (ref 3.8–10.5)

## 2023-01-19 RX ORDER — POLYETHYLENE GLYCOL 3350 17 G/17G
17 POWDER, FOR SOLUTION ORAL ONCE
Refills: 0 | Status: COMPLETED | OUTPATIENT
Start: 2023-01-19 | End: 2023-01-19

## 2023-01-19 RX ORDER — DEXTROSE MONOHYDRATE, SODIUM CHLORIDE, AND POTASSIUM CHLORIDE 50; .745; 4.5 G/1000ML; G/1000ML; G/1000ML
1000 INJECTION, SOLUTION INTRAVENOUS
Refills: 0 | Status: DISCONTINUED | OUTPATIENT
Start: 2023-01-19 | End: 2023-01-20

## 2023-01-19 RX ORDER — POTASSIUM CHLORIDE 20 MEQ
10 PACKET (EA) ORAL ONCE
Refills: 0 | Status: COMPLETED | OUTPATIENT
Start: 2023-01-19 | End: 2023-01-19

## 2023-01-19 RX ADMIN — ENOXAPARIN SODIUM 40 MILLIGRAM(S): 100 INJECTION SUBCUTANEOUS at 06:28

## 2023-01-19 RX ADMIN — Medication 200 MILLIGRAM(S): at 07:28

## 2023-01-19 RX ADMIN — DEXTROSE MONOHYDRATE, SODIUM CHLORIDE, AND POTASSIUM CHLORIDE 50 MILLILITER(S): 50; .745; 4.5 INJECTION, SOLUTION INTRAVENOUS at 10:28

## 2023-01-19 RX ADMIN — Medication 1000 MILLIGRAM(S): at 06:59

## 2023-01-19 RX ADMIN — Medication 1000 MILLIGRAM(S): at 12:06

## 2023-01-19 RX ADMIN — Medication 200 MILLIGRAM(S): at 18:55

## 2023-01-19 RX ADMIN — FINASTERIDE 5 MILLIGRAM(S): 5 TABLET, FILM COATED ORAL at 11:36

## 2023-01-19 RX ADMIN — Medication 100 MILLIGRAM(S): at 14:11

## 2023-01-19 RX ADMIN — POLYETHYLENE GLYCOL 3350 17 GRAM(S): 17 POWDER, FOR SOLUTION ORAL at 12:14

## 2023-01-19 RX ADMIN — Medication 100 MILLIEQUIVALENT(S): at 10:29

## 2023-01-19 RX ADMIN — Medication 400 MILLIGRAM(S): at 11:36

## 2023-01-19 RX ADMIN — AMLODIPINE BESYLATE 2.5 MILLIGRAM(S): 2.5 TABLET ORAL at 06:27

## 2023-01-19 RX ADMIN — Medication 1000 MILLIGRAM(S): at 18:44

## 2023-01-19 RX ADMIN — Medication 63.75 MILLIMOLE(S): at 12:11

## 2023-01-19 RX ADMIN — PANTOPRAZOLE SODIUM 40 MILLIGRAM(S): 20 TABLET, DELAYED RELEASE ORAL at 06:28

## 2023-01-19 RX ADMIN — LATANOPROST 1 DROP(S): 0.05 SOLUTION/ DROPS OPHTHALMIC; TOPICAL at 22:42

## 2023-01-19 RX ADMIN — Medication 100 MILLIGRAM(S): at 22:42

## 2023-01-19 RX ADMIN — LOSARTAN POTASSIUM 25 MILLIGRAM(S): 100 TABLET, FILM COATED ORAL at 06:28

## 2023-01-19 RX ADMIN — Medication 400 MILLIGRAM(S): at 01:24

## 2023-01-19 RX ADMIN — Medication 1000 MILLIGRAM(S): at 01:54

## 2023-01-19 RX ADMIN — Medication 400 MILLIGRAM(S): at 18:14

## 2023-01-19 RX ADMIN — Medication 100 MILLIGRAM(S): at 06:28

## 2023-01-19 RX ADMIN — Medication 400 MILLIGRAM(S): at 06:29

## 2023-01-19 NOTE — PATIENT PROFILE ADULT - FALL HARM RISK - UNIVERSAL INTERVENTIONS
Bed in lowest position, wheels locked, appropriate side rails in place/Call bell, personal items and telephone in reach/Instruct patient to call for assistance before getting out of bed or chair/Non-slip footwear when patient is out of bed/[place] to call system/Physically safe environment - no spills, clutter or unnecessary equipment/Purposeful Proactive Rounding/Room/bathroom lighting operational, light cord in reach

## 2023-01-19 NOTE — PROGRESS NOTE ADULT - ASSESSMENT
82M w PMHx HTN, HLD, BPH, GERD, Vertigo, Glaucoma, and diverticulitis who presents with acute episode of diverticulitis. CT A/P 1/18 w [id number] at first thought to be collection but on further evaluation with Interventional Radiology appears to be a large diverticula.     Plan;   - Nonoperative management - IV Antibiotics  - Advance to CLD as tolerated today  - Pain Control: Tylenol and Dilaudid PRN  - HTN: Home anti hypertensives   - IVF: D5 1/5NS @ 100  - Abx: IV CIPRO/FLAGYL  - Lovenox for chemical VTE ppx   - SCD  - F/u labs   - OOB Ambulate 4x/day with assistance  - Dispo: Likely Home after acute inflammation resolves    #[id number]  A Team Surgery   82M w PMHx HTN, HLD, BPH, GERD, Vertigo, Glaucoma, and diverticulitis who presents with acute episode of diverticulitis. CT A/P 1/18 w [id number] at first thought to be collection but on further evaluation with Interventional Radiology appears to be a large diverticula.     Plan;   - Nonoperative management - IV Antibiotics  - Advance to CLD as tolerated today  - Pain Control: Tylenol and Dilaudid PRN  - HTN: Home anti hypertensives   - IVF: D5 NS +20 @ 50  - Abx: IV CIPRO/FLAGYL  - Lovenox for chemical VTE ppx   - SCD  - F/u labs   - OOB Ambulate 4x/day with assistance  - Dispo: Likely Home after acute inflammation resolves    #[id number]  A Team Surgery

## 2023-01-19 NOTE — PROGRESS NOTE ADULT - ASSESSMENT
Assessment:  82 year old male presents with abdominal pain. Patient is feeling well.     Plan:  - Diet: CLD  - IVF  - DVT PPx: Lovenox  - IV abx: Cipro + Flagyl  - Activity as tolerated    A-Team  [id number]

## 2023-01-20 ENCOUNTER — TRANSCRIPTION ENCOUNTER (OUTPATIENT)
Age: 83
End: 2023-01-20

## 2023-01-20 VITALS
SYSTOLIC BLOOD PRESSURE: 128 MMHG | HEART RATE: 55 BPM | DIASTOLIC BLOOD PRESSURE: 60 MMHG | TEMPERATURE: 99 F | RESPIRATION RATE: 16 BRPM | OXYGEN SATURATION: 98 %

## 2023-01-20 LAB
ANION GAP SERPL CALC-SCNC: 8 MMOL/L — SIGNIFICANT CHANGE UP (ref 7–14)
BUN SERPL-MCNC: 8 MG/DL — SIGNIFICANT CHANGE UP (ref 7–23)
CALCIUM SERPL-MCNC: 9.2 MG/DL — SIGNIFICANT CHANGE UP (ref 8.4–10.5)
CHLORIDE SERPL-SCNC: 104 MMOL/L — SIGNIFICANT CHANGE UP (ref 98–107)
CO2 SERPL-SCNC: 26 MMOL/L — SIGNIFICANT CHANGE UP (ref 22–31)
CREAT SERPL-MCNC: 1.02 MG/DL — SIGNIFICANT CHANGE UP (ref 0.5–1.3)
EGFR: 73 ML/MIN/1.73M2 — SIGNIFICANT CHANGE UP
GLUCOSE SERPL-MCNC: 100 MG/DL — HIGH (ref 70–99)
HCT VFR BLD CALC: 38.9 % — LOW (ref 39–50)
HGB BLD-MCNC: 12.9 G/DL — LOW (ref 13–17)
MAGNESIUM SERPL-MCNC: 2 MG/DL — SIGNIFICANT CHANGE UP (ref 1.6–2.6)
MCHC RBC-ENTMCNC: 31.3 PG — SIGNIFICANT CHANGE UP (ref 27–34)
MCHC RBC-ENTMCNC: 33.2 GM/DL — SIGNIFICANT CHANGE UP (ref 32–36)
MCV RBC AUTO: 94.4 FL — SIGNIFICANT CHANGE UP (ref 80–100)
NRBC # BLD: 0 /100 WBCS — SIGNIFICANT CHANGE UP (ref 0–0)
NRBC # FLD: 0 K/UL — SIGNIFICANT CHANGE UP (ref 0–0)
PHOSPHATE SERPL-MCNC: 2.6 MG/DL — SIGNIFICANT CHANGE UP (ref 2.5–4.5)
PLATELET # BLD AUTO: 268 K/UL — SIGNIFICANT CHANGE UP (ref 150–400)
POTASSIUM SERPL-MCNC: 4 MMOL/L — SIGNIFICANT CHANGE UP (ref 3.5–5.3)
POTASSIUM SERPL-SCNC: 4 MMOL/L — SIGNIFICANT CHANGE UP (ref 3.5–5.3)
RBC # BLD: 4.12 M/UL — LOW (ref 4.2–5.8)
RBC # FLD: 12.6 % — SIGNIFICANT CHANGE UP (ref 10.3–14.5)
SODIUM SERPL-SCNC: 138 MMOL/L — SIGNIFICANT CHANGE UP (ref 135–145)
WBC # BLD: 6.27 K/UL — SIGNIFICANT CHANGE UP (ref 3.8–10.5)
WBC # FLD AUTO: 6.27 K/UL — SIGNIFICANT CHANGE UP (ref 3.8–10.5)

## 2023-01-20 RX ORDER — METRONIDAZOLE 500 MG
1 TABLET ORAL
Qty: 30 | Refills: 0
Start: 2023-01-20 | End: 2023-01-29

## 2023-01-20 RX ORDER — CIPROFLOXACIN LACTATE 400MG/40ML
1 VIAL (ML) INTRAVENOUS
Qty: 20 | Refills: 0
Start: 2023-01-20 | End: 2023-01-29

## 2023-01-20 RX ORDER — INFLUENZA VIRUS VACCINE 15; 15; 15; 15 UG/.5ML; UG/.5ML; UG/.5ML; UG/.5ML
0.7 SUSPENSION INTRAMUSCULAR ONCE
Refills: 0 | Status: DISCONTINUED | OUTPATIENT
Start: 2023-01-20 | End: 2023-01-20

## 2023-01-20 RX ORDER — MECLIZINE HCL 12.5 MG
1 TABLET ORAL
Qty: 0 | Refills: 0 | DISCHARGE

## 2023-01-20 RX ADMIN — PANTOPRAZOLE SODIUM 40 MILLIGRAM(S): 20 TABLET, DELAYED RELEASE ORAL at 06:39

## 2023-01-20 RX ADMIN — Medication 100 MILLIGRAM(S): at 14:12

## 2023-01-20 RX ADMIN — AMLODIPINE BESYLATE 2.5 MILLIGRAM(S): 2.5 TABLET ORAL at 06:39

## 2023-01-20 RX ADMIN — DEXTROSE MONOHYDRATE, SODIUM CHLORIDE, AND POTASSIUM CHLORIDE 50 MILLILITER(S): 50; .745; 4.5 INJECTION, SOLUTION INTRAVENOUS at 12:18

## 2023-01-20 RX ADMIN — LOSARTAN POTASSIUM 25 MILLIGRAM(S): 100 TABLET, FILM COATED ORAL at 06:39

## 2023-01-20 RX ADMIN — Medication 200 MILLIGRAM(S): at 17:14

## 2023-01-20 RX ADMIN — Medication 100 MILLIGRAM(S): at 06:38

## 2023-01-20 RX ADMIN — FINASTERIDE 5 MILLIGRAM(S): 5 TABLET, FILM COATED ORAL at 12:18

## 2023-01-20 RX ADMIN — Medication 200 MILLIGRAM(S): at 06:38

## 2023-01-20 RX ADMIN — ENOXAPARIN SODIUM 40 MILLIGRAM(S): 100 INJECTION SUBCUTANEOUS at 06:39

## 2023-01-20 NOTE — DISCHARGE NOTE NURSING/CASE MANAGEMENT/SOCIAL WORK - NSDCPEFALRISK_GEN_ALL_CORE
For information on Fall & Injury Prevention, visit: [email or web address] OR  [email or web address] OR  https://www.cdc.gov/steadi/patient.html

## 2023-01-20 NOTE — DISCHARGE NOTE PROVIDER - CARE PROVIDER_API CALL
Robert Parada)  ColonRectal Surgery; Surgery  [address] [facility] [address]  [place] [address]  Phone: [phone]  Fax: [phone]  Follow Up Time:

## 2023-01-20 NOTE — DISCHARGE NOTE NURSING/CASE MANAGEMENT/SOCIAL WORK - PATIENT PORTAL LINK FT
You can access the FollowMyHealth Patient Portal offered by [facility] by registering at the following website: [email or web address]. By joining [facility]’s FollowMyHealth portal, you will also be able to view your health information using other applications (apps) compatible with our system.

## 2023-01-20 NOTE — DISCHARGE NOTE PROVIDER - NSDCFUSCHEDAPPT_GEN_ALL_CORE_FT
Anish Singleton  [facility]  [facility] [address]  Scheduled Appointment: 01/24/2023

## 2023-01-20 NOTE — PROGRESS NOTE ADULT - ASSESSMENT
82M w PMHx HTN, HLD, BPH, GERD, Vertigo, Glaucoma, and diverticulitis who presents with acute episode of diverticulitis. CT A/P 1/18 w [id number] at first thought to be collection but on further evaluation with Interventional Radiology appears to be a large diverticula.     Plan:  -Advance Diet   - Nonoperative management - IV Antibiotics  - Pain Control: Tylenol and Dilaudid PRN  - HTN: Home anti hypertensives   - Abx: IV CIPRO/FLAGYL  - Lovenox for chemical VTE ppx   - F/u labs   - OOB Ambulate 4x/day with assistance  - Dispo: home     [id number]  A Team Surgery

## 2023-01-20 NOTE — DISCHARGE NOTE PROVIDER - NSDCMRMEDTOKEN_GEN_ALL_CORE_FT
amLODIPine 2.5 mg oral tablet: 1 tab(s) orally once a day  betamethasone dipropionate 0.05% topical lotion: Apply topically to affected area , As Needed  finasteride 5 mg oral tablet: 1 tab(s) orally once a day  losartan 25 mg oral tablet: 1 tab(s) orally once a day  meclizine 25 mg oral tablet: 1 tab(s) orally , As Needed  omeprazole 20 mg oral delayed release capsule: 1 cap(s) orally once a day   amLODIPine 2.5 mg oral tablet: 1 tab(s) orally once a day  betamethasone dipropionate 0.05% topical lotion: Apply topically to affected area , As Needed  ciprofloxacin 500 mg oral tablet: 1 tab(s) orally 2 times a day   finasteride 5 mg oral tablet: 1 tab(s) orally once a day  losartan 25 mg oral tablet: 1 tab(s) orally once a day  meclizine 25 mg oral tablet: 1 tab(s) orally once a day, As Needed  metroNIDAZOLE 500 mg oral tablet: 1 tab(s) orally every 8 hours   omeprazole 20 mg oral delayed release capsule: 1 cap(s) orally once a day

## 2023-01-20 NOTE — PROGRESS NOTE ADULT - SUBJECTIVE AND OBJECTIVE BOX
A Team General Surgery Progress Note    S:   Pt seen and examined with team on morning rounds. Patient without any complaints. Denies abdominal pain, N/V, fever, chills, SOB, chest pain.     Vital Signs Last 24 Hrs  T(C): 36.8 (01-20-23 @ 02:14), Max: 37.1 (01-19-23 @ 18:08)  HR: 54 (01-20-23 @ 02:14) (50 - 62)  BP: 137/62 (01-20-23 @ 02:14) (132/60 - 140/64)  ABP: --  ABP(mean): --  RR: 18 (01-20-23 @ 02:14) (17 - 18)  SpO2: 98% (01-20-23 @ 02:14) (97% - 99%)  Wt(kg): --  CVP(mm Hg): --      01-19 @ 07:01  -  01-20 @ 07:00  --------------------------------------------------------  IN:    dextrose 5% + sodium chloride 0.9% w/ Additives: 650 mL    IV PiggyBack: 850 mL    Oral Fluid: 1320 mL  Total IN: 2820 mL    OUT:    Voided (mL): 800 mL  Total OUT: 800 mL    Total NET: 2020 mL    MEDICATIONS  (STANDING):  acetaminophen   IVPB .. 1000 milliGRAM(s) IV Intermittent every 6 hours  amLODIPine   Tablet 2.5 milliGRAM(s) Oral every 24 hours  ciprofloxacin   IVPB 400 milliGRAM(s) IV Intermittent every 12 hours  ciprofloxacin   IVPB      enoxaparin Injectable 40 milliGRAM(s) SubCutaneous every 24 hours  finasteride 5 milliGRAM(s) Oral daily  lactated ringers. 1000 milliLiter(s) (100 mL/Hr) IV Continuous <Continuous>  latanoprost 0.005% Ophthalmic Solution 1 Drop(s) Both EYES at bedtime  losartan 25 milliGRAM(s) Oral every 24 hours  metroNIDAZOLE  IVPB      metroNIDAZOLE  IVPB 500 milliGRAM(s) IV Intermittent every 8 hours  pantoprazole    Tablet 40 milliGRAM(s) Oral before breakfast  potassium chloride  10 mEq/100 mL IVPB 10 milliEquivalent(s) IV Intermittent once  sodium phosphate 15 milliMole(s)/250 mL IVPB 15 milliMole(s) IV Intermittent once    MEDICATIONS  (PRN):  meclizine 25 milliGRAM(s) Oral every 24 hours PRN Dizziness    EXAM:  General: A&Ox3, NAD  Respiratory: unlabored breathing  CVS: Regular rate and rhythm  Abdomen: Soft, non-distended, non-tender. No rebound, no guarding. No palpable organomegaly or masses.   Extremities: Warm bilaterally       LABS:    CBC (01-19 @ 05:55)                              13.7                           6.84    )----------------(  265        --    % Neutrophils, --    % Lymphocytes, ANC: --                                  40.7      BMP (01-19 @ 05:55)             134<L>  |  101     |  13    		Ca++ --      Ca 9.7                ---------------------------------( 90    		Mg 2.10               3.9     |  24      |  1.00  			Ph 2.6                       
A Team General Surgery Progress Note    S: Pt seen and examined with team on morning rounds. Patient feels well. Pain much improved since yesterday. Denies nausea/emesis. +Flatus. +BM.       Vital Signs Last 24 Hrs  T(C): 37.1 (19 Jan 2023 06:41), Max: 37.1 (19 Jan 2023 06:41)  T(F): 98.7 (19 Jan 2023 06:41), Max: 98.7 (19 Jan 2023 06:41)  HR: 70 (19 Jan 2023 06:41) (55 - 70)  BP: 129/63 (19 Jan 2023 06:41) (129/63 - 149/63)  BP(mean): --  RR: 18 (19 Jan 2023 06:41) (16 - 18)  SpO2: 99% (19 Jan 2023 06:41) (98% - 100%)    Parameters below as of 19 Jan 2023 06:41  Patient On (Oxygen Delivery Method): room air        I&O's Summary    18 Jan 2023 07:01  -  19 Jan 2023 07:00  --------------------------------------------------------  IN: 1000 mL / OUT: 800 mL / NET: 200 mL      I&O's Detail    18 Jan 2023 07:01  -  19 Jan 2023 07:00  --------------------------------------------------------  IN:    IV PiggyBack: 200 mL    Lactated Ringers: 800 mL  Total IN: 1000 mL    OUT:    Voided (mL): 800 mL  Total OUT: 800 mL    Total NET: 200 mL          General Appearance: Appears well, NAD  Chest: Breathing comfortably on RA.    CV: S1, S2 @ 70  Abdomen: Soft, nondistended, minimal tenderness lower abdomen worst in RLQ.  Extremities: Moves all extremities.  .  MEDICATIONS  (STANDING):  acetaminophen   IVPB .. 1000 milliGRAM(s) IV Intermittent every 6 hours  amLODIPine   Tablet 2.5 milliGRAM(s) Oral every 24 hours  ciprofloxacin   IVPB 400 milliGRAM(s) IV Intermittent every 12 hours  ciprofloxacin   IVPB      enoxaparin Injectable 40 milliGRAM(s) SubCutaneous every 24 hours  finasteride 5 milliGRAM(s) Oral daily  lactated ringers. 1000 milliLiter(s) (100 mL/Hr) IV Continuous <Continuous>  latanoprost 0.005% Ophthalmic Solution 1 Drop(s) Both EYES at bedtime  losartan 25 milliGRAM(s) Oral every 24 hours  metroNIDAZOLE  IVPB      metroNIDAZOLE  IVPB 500 milliGRAM(s) IV Intermittent every 8 hours  pantoprazole    Tablet 40 milliGRAM(s) Oral before breakfast  potassium chloride  10 mEq/100 mL IVPB 10 milliEquivalent(s) IV Intermittent once  sodium phosphate 15 milliMole(s)/250 mL IVPB 15 milliMole(s) IV Intermittent once    MEDICATIONS  (PRN):  meclizine 25 milliGRAM(s) Oral every 24 hours PRN Dizziness      LABS:                        13.7   6.84  )-----------( 265      ( 19 Jan 2023 05:55 )             40.7     01-19    134<L>  |  101  |  13  ----------------------------<  90  3.9   |  24  |  1.00    Ca    9.7      19 Jan 2023 05:55  Phos  2.6     01-19  Mg     2.10     01-19    TPro  7.5  /  Alb  4.0  /  TBili  0.3  /  DBili  x   /  AST  32  /  ALT  53<H>  /  AlkPhos  163<H>  01-18    PT/INR - ( 18 Jan 2023 12:15 )   PT: 11.8 sec;   INR: 1.02 ratio         PTT - ( 18 Jan 2023 12:15 )  PTT:27.9 sec  Urinalysis Basic - ( 18 Jan 2023 12:40 )    Color: Yellow / Appearance: Clear / SG: >1.050 / pH: x  Gluc: x / Ketone: Negative  / Bili: Negative / Urobili: 3 mg/dL   Blood: x / Protein: Trace / Nitrite: Negative   Leuk Esterase: Negative / RBC: x / WBC x   Sq Epi: x / Non Sq Epi: x / Bacteria: x              
TEAM [ A ] Surgery Daily Progress Note  =====================================================  SUBJECTIVE: No acute events overnight. Patient seen and examined at bedside on AM rounds. Patient reports that they're feeling well. NPO, denies nausea, vomiting.   - Flatus/  - BM. OOB/Amublating as tolerated. Denies fever, chills.      ALLERGIES:  No Known Allergies  --------------------------------------------------------------------------------------    MEDICATIONS:    Neurologic Medications  acetaminophen   IVPB .. 1000 milliGRAM(s) IV Intermittent every 6 hours  meclizine 25 milliGRAM(s) Oral every 24 hours PRN Dizziness    Respiratory Medications    Cardiovascular Medications  amLODIPine   Tablet 2.5 milliGRAM(s) Oral every 24 hours  losartan 25 milliGRAM(s) Oral every 24 hours    Gastrointestinal Medications  lactated ringers. 1000 milliLiter(s) IV Continuous <Continuous>  pantoprazole    Tablet 40 milliGRAM(s) Oral before breakfast  potassium chloride  10 mEq/100 mL IVPB 10 milliEquivalent(s) IV Intermittent once  sodium phosphate 15 milliMole(s)/250 mL IVPB 15 milliMole(s) IV Intermittent once    Genitourinary Medications    Hematologic/Oncologic Medications  enoxaparin Injectable 40 milliGRAM(s) SubCutaneous every 24 hours    Antimicrobial/Immunologic Medications  ciprofloxacin   IVPB 400 milliGRAM(s) IV Intermittent every 12 hours  ciprofloxacin   IVPB      metroNIDAZOLE  IVPB      metroNIDAZOLE  IVPB 500 milliGRAM(s) IV Intermittent every 8 hours    Endocrine/Metabolic Medications  finasteride 5 milliGRAM(s) Oral daily    Topical/Other Medications  latanoprost 0.005% Ophthalmic Solution 1 Drop(s) Both EYES at bedtime    --------------------------------------------------------------------------------------    VITAL SIGNS:    Vital Signs Last 24 Hrs  T(C): 37.1 (19 Jan 2023 06:41), Max: 37.1 (19 Jan 2023 06:41)  T(F): 98.7 (19 Jan 2023 06:41), Max: 98.7 (19 Jan 2023 06:41)  HR: 70 (19 Jan 2023 06:41) (55 - 70)  BP: 129/63 (19 Jan 2023 06:41) (129/63 - 149/63)  BP(mean): --  RR: 18 (19 Jan 2023 06:41) (16 - 18)  SpO2: 99% (19 Jan 2023 06:41) (98% - 100%)    Parameters below as of 19 Jan 2023 06:41  Patient On (Oxygen Delivery Method): room air      --------------------------------------------------------------------------------------    EXAM    General: NAD, resting in bed comfortably.  Cardiac: regular rate, warm and well perfused  Respiratory: Nonlabored respirations, normal cw expansion.  Abdomen: soft, mildly tender, nondistended.   Extremities: normal strength, FROM, no deformities    --------------------------------------------------------------------------------------      LABS    .  LABS:                         13.7   6.84  )-----------( 265      ( 19 Jan 2023 05:55 )             40.7     01-19    134<L>  |  101  |  13  ----------------------------<  90  3.9   |  24  |  1.00    Ca    9.7      19 Jan 2023 05:55  Phos  2.6     01-19  Mg     2.10     01-19    TPro  7.5  /  Alb  4.0  /  TBili  0.3  /  DBili  x   /  AST  32  /  ALT  53<H>  /  AlkPhos  163<H>  01-18    PT/INR - ( 18 Jan 2023 12:15 )   PT: 11.8 sec;   INR: 1.02 ratio         PTT - ( 18 Jan 2023 12:15 )  PTT:27.9 sec  Urinalysis Basic - ( 18 Jan 2023 12:40 )    Color: Yellow / Appearance: Clear / SG: >1.050 / pH: x  Gluc: x / Ketone: Negative  / Bili: Negative / Urobili: 3 mg/dL   Blood: x / Protein: Trace / Nitrite: Negative   Leuk Esterase: Negative / RBC: x / WBC x   Sq Epi: x / Non Sq Epi: x / Bacteria: x            RADIOLOGY, EKG & ADDITIONAL TESTS: Reviewed.

## 2023-01-20 NOTE — PROGRESS NOTE ADULT - ATTENDING COMMENTS
Pt seen/examined, overall feeling better. Positive bowel function. Abd soft, ND, mild discomfort to LLQ palpation.    - adv to [facility]  - poss dc after dinner with PO cipro/flagyl for 10d  - f/u in the office next wk to plan f/u CT scan

## 2023-01-20 NOTE — DISCHARGE NOTE PROVIDER - HOSPITAL COURSE
82M w PMHx HTN, HLD, BPH, GERD, Vertigo, Glaucoma, and diverticulitis who presents with abdominal pain since Saturday. Started on PO abx Sunday, patient keeps his own supply, however PMD ordered outpatient CT A/P to evaluate abdominal pain. CT A/P 1/18 w 6 x 6 x 6cm thick walled air collection cf contained perforation. Patient denies fevers, chills, nausea, vomiting, chest pain, SOB, diarrhea, hematochezia, hematemesis. Passing flatus and BMs.  CT reviewed with radiology and noted that collection was likely large diverticula. During hospital course patients diet was slowly advanced as tolerated.  At this time, pt is tolerating a regular diet, ambulating and voiding.  Pt has been deemed stable for discharge at this time.

## 2023-01-24 ENCOUNTER — APPOINTMENT (OUTPATIENT)
Dept: GASTROENTEROLOGY | Facility: CLINIC | Age: 83
End: 2023-01-24

## 2023-01-27 ENCOUNTER — APPOINTMENT (OUTPATIENT)
Dept: CT IMAGING | Facility: CLINIC | Age: 83
End: 2023-01-27
Payer: MEDICARE

## 2023-01-27 ENCOUNTER — OUTPATIENT (OUTPATIENT)
Dept: OUTPATIENT SERVICES | Facility: HOSPITAL | Age: 83
LOS: 1 days | End: 2023-01-27
Payer: MEDICARE

## 2023-01-27 DIAGNOSIS — Z00.00 ENCOUNTER FOR GENERAL ADULT MEDICAL EXAMINATION WITHOUT ABNORMAL FINDINGS: ICD-10-CM

## 2023-01-27 PROCEDURE — 74177 CT ABD & PELVIS W/CONTRAST: CPT | Mod: 26,MH

## 2023-01-27 PROCEDURE — 74177 CT ABD & PELVIS W/CONTRAST: CPT | Mod: MH

## 2023-01-30 ENCOUNTER — RX RENEWAL (OUTPATIENT)
Age: 83
End: 2023-01-30

## 2023-02-01 ENCOUNTER — TRANSCRIPTION ENCOUNTER (OUTPATIENT)
Age: 83
End: 2023-02-01

## 2023-02-01 ENCOUNTER — INPATIENT (INPATIENT)
Facility: HOSPITAL | Age: 83
LOS: 7 days | Discharge: HOME HEALTH | End: 2023-02-09
Attending: SURGERY | Admitting: SURGERY
Payer: MEDICARE

## 2023-02-01 VITALS — SYSTOLIC BLOOD PRESSURE: 109 MMHG | DIASTOLIC BLOOD PRESSURE: 53 MMHG | HEIGHT: 70 IN

## 2023-02-01 DIAGNOSIS — R10.9 UNSPECIFIED ABDOMINAL PAIN: ICD-10-CM

## 2023-02-01 LAB
ALBUMIN SERPL ELPH-MCNC: 4.1 G/DL — SIGNIFICANT CHANGE UP (ref 3.3–5)
ALP SERPL-CCNC: 103 U/L — SIGNIFICANT CHANGE UP (ref 40–120)
ALT FLD-CCNC: 62 U/L — HIGH (ref 4–41)
ANION GAP SERPL CALC-SCNC: 10 MMOL/L — SIGNIFICANT CHANGE UP (ref 7–14)
APTT BLD: 30.1 SEC — SIGNIFICANT CHANGE UP (ref 27–36.3)
AST SERPL-CCNC: 34 U/L — SIGNIFICANT CHANGE UP (ref 4–40)
BASOPHILS # BLD AUTO: 0.05 K/UL — SIGNIFICANT CHANGE UP (ref 0–0.2)
BASOPHILS NFR BLD AUTO: 0.7 % — SIGNIFICANT CHANGE UP (ref 0–2)
BILIRUB SERPL-MCNC: 0.4 MG/DL — SIGNIFICANT CHANGE UP (ref 0.2–1.2)
BLD GP AB SCN SERPL QL: NEGATIVE — SIGNIFICANT CHANGE UP
BUN SERPL-MCNC: 13 MG/DL — SIGNIFICANT CHANGE UP (ref 7–23)
CALCIUM SERPL-MCNC: 9.6 MG/DL — SIGNIFICANT CHANGE UP (ref 8.4–10.5)
CHLORIDE SERPL-SCNC: 97 MMOL/L — LOW (ref 98–107)
CO2 SERPL-SCNC: 25 MMOL/L — SIGNIFICANT CHANGE UP (ref 22–31)
CREAT SERPL-MCNC: 1.04 MG/DL — SIGNIFICANT CHANGE UP (ref 0.5–1.3)
EGFR: 72 ML/MIN/1.73M2 — SIGNIFICANT CHANGE UP
EOSINOPHIL # BLD AUTO: 0.2 K/UL — SIGNIFICANT CHANGE UP (ref 0–0.5)
EOSINOPHIL NFR BLD AUTO: 3 % — SIGNIFICANT CHANGE UP (ref 0–6)
FLUAV AG NPH QL: SIGNIFICANT CHANGE UP
FLUBV AG NPH QL: SIGNIFICANT CHANGE UP
GLUCOSE SERPL-MCNC: 99 MG/DL — SIGNIFICANT CHANGE UP (ref 70–99)
HCT VFR BLD CALC: 43.9 % — SIGNIFICANT CHANGE UP (ref 39–50)
HGB BLD-MCNC: 14.4 G/DL — SIGNIFICANT CHANGE UP (ref 13–17)
IANC: 4.28 K/UL — SIGNIFICANT CHANGE UP (ref 1.8–7.4)
IMM GRANULOCYTES NFR BLD AUTO: 0.3 % — SIGNIFICANT CHANGE UP (ref 0–0.9)
INR BLD: 1.06 RATIO — SIGNIFICANT CHANGE UP (ref 0.88–1.16)
LYMPHOCYTES # BLD AUTO: 1.12 K/UL — SIGNIFICANT CHANGE UP (ref 1–3.3)
LYMPHOCYTES # BLD AUTO: 16.7 % — SIGNIFICANT CHANGE UP (ref 13–44)
MCHC RBC-ENTMCNC: 30.8 PG — SIGNIFICANT CHANGE UP (ref 27–34)
MCHC RBC-ENTMCNC: 32.8 GM/DL — SIGNIFICANT CHANGE UP (ref 32–36)
MCV RBC AUTO: 94 FL — SIGNIFICANT CHANGE UP (ref 80–100)
MONOCYTES # BLD AUTO: 1.05 K/UL — HIGH (ref 0–0.9)
MONOCYTES NFR BLD AUTO: 15.6 % — HIGH (ref 2–14)
NEUTROPHILS # BLD AUTO: 4.28 K/UL — SIGNIFICANT CHANGE UP (ref 1.8–7.4)
NEUTROPHILS NFR BLD AUTO: 63.7 % — SIGNIFICANT CHANGE UP (ref 43–77)
NRBC # BLD: 0 /100 WBCS — SIGNIFICANT CHANGE UP (ref 0–0)
NRBC # FLD: 0 K/UL — SIGNIFICANT CHANGE UP (ref 0–0)
PLATELET # BLD AUTO: 340 K/UL — SIGNIFICANT CHANGE UP (ref 150–400)
POTASSIUM SERPL-MCNC: 3.8 MMOL/L — SIGNIFICANT CHANGE UP (ref 3.5–5.3)
POTASSIUM SERPL-SCNC: 3.8 MMOL/L — SIGNIFICANT CHANGE UP (ref 3.5–5.3)
PROT SERPL-MCNC: 7.4 G/DL — SIGNIFICANT CHANGE UP (ref 6–8.3)
PROTHROM AB SERPL-ACNC: 12.3 SEC — SIGNIFICANT CHANGE UP (ref 10.5–13.4)
RBC # BLD: 4.67 M/UL — SIGNIFICANT CHANGE UP (ref 4.2–5.8)
RBC # FLD: 13.2 % — SIGNIFICANT CHANGE UP (ref 10.3–14.5)
RH IG SCN BLD-IMP: POSITIVE — SIGNIFICANT CHANGE UP
RSV RNA NPH QL NAA+NON-PROBE: SIGNIFICANT CHANGE UP
SARS-COV-2 RNA SPEC QL NAA+PROBE: SIGNIFICANT CHANGE UP
SODIUM SERPL-SCNC: 132 MMOL/L — LOW (ref 135–145)
WBC # BLD: 6.72 K/UL — SIGNIFICANT CHANGE UP (ref 3.8–10.5)
WBC # FLD AUTO: 6.72 K/UL — SIGNIFICANT CHANGE UP (ref 3.8–10.5)

## 2023-02-01 PROCEDURE — 99285 EMERGENCY DEPT VISIT HI MDM: CPT | Mod: GC

## 2023-02-01 PROCEDURE — 71045 X-RAY EXAM CHEST 1 VIEW: CPT | Mod: 26

## 2023-02-01 RX ORDER — METRONIDAZOLE 500 MG
500 TABLET ORAL EVERY 12 HOURS
Refills: 0 | Status: COMPLETED | OUTPATIENT
Start: 2023-02-01 | End: 2023-02-02

## 2023-02-01 RX ORDER — CIPROFLOXACIN LACTATE 400MG/40ML
500 VIAL (ML) INTRAVENOUS EVERY 8 HOURS
Refills: 0 | Status: DISCONTINUED | OUTPATIENT
Start: 2023-02-01 | End: 2023-02-01

## 2023-02-01 RX ORDER — FINASTERIDE 5 MG/1
5 TABLET, FILM COATED ORAL DAILY
Refills: 0 | Status: DISCONTINUED | OUTPATIENT
Start: 2023-02-01 | End: 2023-02-09

## 2023-02-01 RX ORDER — PANTOPRAZOLE SODIUM 20 MG/1
40 TABLET, DELAYED RELEASE ORAL DAILY
Refills: 0 | Status: DISCONTINUED | OUTPATIENT
Start: 2023-02-01 | End: 2023-02-08

## 2023-02-01 RX ORDER — SODIUM CHLORIDE 9 MG/ML
1000 INJECTION, SOLUTION INTRAVENOUS
Refills: 0 | Status: DISCONTINUED | OUTPATIENT
Start: 2023-02-01 | End: 2023-02-03

## 2023-02-01 RX ORDER — CIPROFLOXACIN LACTATE 400MG/40ML
400 VIAL (ML) INTRAVENOUS EVERY 12 HOURS
Refills: 0 | Status: DISCONTINUED | OUTPATIENT
Start: 2023-02-01 | End: 2023-02-01

## 2023-02-01 RX ORDER — CIPROFLOXACIN LACTATE 400MG/40ML
500 VIAL (ML) INTRAVENOUS EVERY 12 HOURS
Refills: 0 | Status: COMPLETED | OUTPATIENT
Start: 2023-02-01 | End: 2023-02-02

## 2023-02-01 RX ORDER — ONDANSETRON 8 MG/1
4 TABLET, FILM COATED ORAL ONCE
Refills: 0 | Status: COMPLETED | OUTPATIENT
Start: 2023-02-01 | End: 2023-02-02

## 2023-02-01 RX ORDER — AMLODIPINE BESYLATE 2.5 MG/1
2.5 TABLET ORAL DAILY
Refills: 0 | Status: DISCONTINUED | OUTPATIENT
Start: 2023-02-01 | End: 2023-02-09

## 2023-02-01 RX ORDER — POLYETHYLENE GLYCOL 3350 17 G/17G
17 POWDER, FOR SOLUTION ORAL
Refills: 0 | Status: DISCONTINUED | OUTPATIENT
Start: 2023-02-01 | End: 2023-02-01

## 2023-02-01 RX ORDER — METRONIDAZOLE 500 MG
500 TABLET ORAL ONCE
Refills: 0 | Status: COMPLETED | OUTPATIENT
Start: 2023-02-01 | End: 2023-02-01

## 2023-02-01 RX ORDER — METRONIDAZOLE 500 MG
500 TABLET ORAL EVERY 8 HOURS
Refills: 0 | Status: DISCONTINUED | OUTPATIENT
Start: 2023-02-01 | End: 2023-02-01

## 2023-02-01 RX ORDER — SODIUM CHLORIDE 9 MG/ML
1000 INJECTION, SOLUTION INTRAVENOUS
Refills: 0 | Status: DISCONTINUED | OUTPATIENT
Start: 2023-02-01 | End: 2023-02-01

## 2023-02-01 RX ORDER — METRONIDAZOLE 500 MG
250 TABLET ORAL EVERY 8 HOURS
Refills: 0 | Status: DISCONTINUED | OUTPATIENT
Start: 2023-02-01 | End: 2023-02-01

## 2023-02-01 RX ORDER — AMLODIPINE BESYLATE 2.5 MG/1
2.5 TABLET ORAL DAILY
Refills: 0 | Status: DISCONTINUED | OUTPATIENT
Start: 2023-02-01 | End: 2023-02-01

## 2023-02-01 RX ORDER — ENOXAPARIN SODIUM 100 MG/ML
40 INJECTION SUBCUTANEOUS EVERY 24 HOURS
Refills: 0 | Status: DISCONTINUED | OUTPATIENT
Start: 2023-02-01 | End: 2023-02-09

## 2023-02-01 RX ORDER — POLYETHYLENE GLYCOL 3350 17 G/17G
255 POWDER, FOR SOLUTION ORAL ONCE
Refills: 0 | Status: COMPLETED | OUTPATIENT
Start: 2023-02-01 | End: 2023-02-01

## 2023-02-01 RX ORDER — NEOMYCIN SULFATE 500 MG/1
500 TABLET ORAL EVERY 8 HOURS
Refills: 0 | Status: DISCONTINUED | OUTPATIENT
Start: 2023-02-01 | End: 2023-02-01

## 2023-02-01 RX ORDER — CIPROFLOXACIN LACTATE 400MG/40ML
400 VIAL (ML) INTRAVENOUS ONCE
Refills: 0 | Status: COMPLETED | OUTPATIENT
Start: 2023-02-01 | End: 2023-02-01

## 2023-02-01 RX ADMIN — Medication 100 MILLIGRAM(S): at 12:45

## 2023-02-01 RX ADMIN — Medication 500 MILLIGRAM(S): at 17:45

## 2023-02-01 RX ADMIN — SODIUM CHLORIDE 125 MILLILITER(S): 9 INJECTION, SOLUTION INTRAVENOUS at 17:47

## 2023-02-01 RX ADMIN — POLYETHYLENE GLYCOL 3350 17 GRAM(S): 17 POWDER, FOR SOLUTION ORAL at 14:46

## 2023-02-01 RX ADMIN — Medication 200 MILLIGRAM(S): at 11:18

## 2023-02-01 RX ADMIN — Medication 500 MILLIGRAM(S): at 17:44

## 2023-02-01 RX ADMIN — POLYETHYLENE GLYCOL 3350 17 GRAM(S): 17 POWDER, FOR SOLUTION ORAL at 16:01

## 2023-02-01 RX ADMIN — POLYETHYLENE GLYCOL 3350 17 GRAM(S): 17 POWDER, FOR SOLUTION ORAL at 13:02

## 2023-02-01 RX ADMIN — POLYETHYLENE GLYCOL 3350 255 GRAM(S): 17 POWDER, FOR SOLUTION ORAL at 17:46

## 2023-02-01 NOTE — ED ADULT TRIAGE NOTE - CHIEF COMPLAINT QUOTE
Pt with diverticulitis co abdominal pain x one week was seen in ER last week for same compliant. Pt states on oral cipro but still has pain not feeling any better. pt states having normal Bowel movements no nausea tolerating po intake.

## 2023-02-01 NOTE — ED ADULT NURSE NOTE - HIV OFFER
Discharge instructions given. Patient verbalized understanding.   Return to [facility] as needed  Wound healed Opt out

## 2023-02-01 NOTE — PATIENT PROFILE ADULT - OVER THE PAST TWO WEEKS, HAVE YOU FELT LITTLE INTEREST OR PLEASURE IN DOING THINGS?
Reason For Call Today:  -Received message from [address]: patient and his medications. Melida Martin seen patient in the office yesterday; see Assessment/plan below:     Thomas's Instructions REviewed:  Assessment & Plan           Diagnosis Orders   1. PND (post-nasal drip)  --pt instructed to take flonase, claritin, and singulair regularly. Will consider allergy testing for persistent symptoms.      2. Seasonal allergies      3. Essential hypertension, benign   --pt states that he got a low bp once so he stopped taking Lisinopril and threw them away. BP ok today, but high normal. Pt advised to take Lisinopril 2.5mg daily. Hold for SBP < 100. Pt to check BP prior to taking medication.       Phone call to patient to review:    Per patient, h did \"start checking blood pressures at home now as of today\":     Blood pressure Reported [id number], HR 66    -Reviewed with patient checking blood pressure daily, and taking Lisinopril as directed if SBP greater than 100.  -patient verbalizes understanding.  -Will forward to ACM on patient case to follow up regarding.    -Patient informed he did not take his Lisinopril yet, but will go take it due to his SBP being greater than 100. Reviewed Blood pressures from previous office visits:   Vitals 6/20/2019 6/8/2019 [id number]/[id number]   SYSTOLIC [phone]   DIASTOLIC 60 78 58   Site   Left Upper Arm   Position   Sitting   Cuff Size   Medium Adult   Pulse 74 80 63   Temp 98.4 98.1 98.2   Resp  18    SpO2 98 97 95   Weight 161 lb 163 lb 5.8 oz 162 lb 12.8 oz   Height  5' 5\"    BMI (wt*703/ht~2)  27.18 kg/m2      Diabetes Reviewed:   Lab Results   Component Value Date    LABA1C 8.1 02/04/2019     Lab Results   Component Value Date     04/03/2018     -patient checking blood sugars 1-2 times weekly.    -Recommended checking daily as A1C is 8.1.  -States, \"his blood sugar is running in the low 100's in the am    Diabetes Assessment    Medic Alert ID:  No  Meal Planning:  Avoidance of blood pressure daily, and if SBP less than 100, I will not take my Lisinopril    Barriers: impairment:  visual, lack of support and overwhelmed by complexity of regimen  Plan for overcoming my barriers: patient to follow with Nurse Care Coordinator  Patient to check Blood Pressure daily in the am.   Patient to check blood sugars daily in the am.  Confidence: 7/10  Anticipated Goal Completion Date: 08/25/19            Care Coordination Plan of Care:    This nurse Care Coordinator will forward to [facility] on patient case to follow up with patient next week regarding blood pressure and Lisinopril    Future Appointments   Date Time Provider Mai Gutierrez   8/12/2019  2:00 PM NOHELIA Briceno CNP [facility]   8/12/2019  2:00 PM [facility] [place]  None no

## 2023-02-01 NOTE — H&P ADULT - ASSESSMENT
82M w h/o HTN, HLD, BPH, GERD, vertigo, glaucoma, with recent admission 1/18 for diverticulitis with large 6x6x6 diverticulum, managed non-op, now presenting with persistent weakness and decreased PO intake, concerning for refractory diverticulitis.    Impression/Plan:  - Admit to surgery Dr. Parada  - Plan for OR tomorrow  - Prep today - miralax x8 doses, fleet enema tomorrow morning  - C/w abx: IV cipro and flagyl    A Team Surgery  Please page [phone] for all questions, not available on Teams.

## 2023-02-01 NOTE — PATIENT PROFILE ADULT - FALL HARM RISK - UNIVERSAL INTERVENTIONS
Bed in lowest position, wheels locked, appropriate side rails in place/Call bell, personal items and telephone in reach/Instruct patient to call for assistance before getting out of bed or chair/Non-slip footwear when patient is out of bed/[facility] to call system/Physically safe environment - no spills, clutter or unnecessary equipment/Purposeful Proactive Rounding/Room/bathroom lighting operational, light cord in reach

## 2023-02-01 NOTE — ED ADULT NURSE NOTE - OBJECTIVE STATEMENT
Pt brought to ED room 1, aaox4, ambulatory, breathing even and unlabored. Pt came to ED after GI MD recommend he comes in to treat his diverticulitis and infection. PMHx of HTN and diverticulitis. Pt states he has been treating an infection as per PCP r/t his diverticulitis since 1/18. Pt states he has been taking flagyl and cipro for the last 10 days. Pt states his GI MD wants to schedule surgery to tx diverticulitis. Pt states last bowel movement was 2 hours ago, pt states he has no difficulty or pain with bowel movements. Pt denies chest pain, SOB, dizziness, headache, or pain at this time. Pt lung sounds clear, heart sounds normal, bowel sounds present. Abdomen is distended, soft and nontender. Skin clear dry and intact. Pt denies N/V/D, bloody or tarry stools, and constipation. Denies urinary symptoms at this time. Pt sinus erin on tele monitor, #20G placed in right AC, labs drawn and sent. Medicated as per eMar.

## 2023-02-01 NOTE — ED PROVIDER NOTE - NS ED ROS FT
GENERAL: no fever, no chills, +malaise  EYES: no change in vision, no irritation, no discharge, no redness, no pain  HEENT: no trouble swallowing or speaking, no throat pain, no ear pain  CARDIAC: no chest pain, no palpitations   PULMONARY: no cough, no shortness of breath, no wheezing  GI: +abdominal pain, no nausea, no vomiting, no diarrhea, no constipation, no melena, no hematochezia, no hematemesis  : no changes in urination, no dysuria, no frequency, no hematuria, no discharge  SKIN: no rashes  NEURO: no headache, no numbness, no weakness  MSK: no joint pain, no muscle pain, no back pain, no calf pain

## 2023-02-01 NOTE — ED PROVIDER NOTE - PHYSICAL EXAMINATION
GENERAL: A&Ox4, non-toxic appearing, no acute distress  HEENT: NCAT, EOMI, oral mucosa moist, normal conjunctiva  RESP: CTAB, no respiratory distress, no wheezes/rhonchi/rales, speaking in full sentences  CV: bradycardic, no murmurs/rubs/gallops  ABDOMEN: soft, non-tender, non-distended, no guarding, no CVA tenderness  MSK: no visible deformities  NEURO: no focal sensory or motor deficits, CN 2-12 grossly intact  SKIN: warm, normal color, well perfused, no rash  PSYCH: normal affect

## 2023-02-01 NOTE — H&P ADULT - HISTORY OF PRESENT ILLNESS
82M w h/o HTN, HLD, BPH, GERD, vertigo, glaucoma, with recent admission 1/18 for diverticulitis with large 6x6x6 diverticulum, managed non-op, now presenting with persistent weakness and decreased PO intake. Pt has been taking oral cipro since discharge however reports no improvement in his condition. Spoke with Dr. Parada with plans for surgery on this admission.

## 2023-02-01 NOTE — H&P ADULT - NSHPLABSRESULTS_GEN_ALL_CORE
LABS:           PT/INR - ( 01 Feb 2023 10:56 )   PT: 12.3 sec;   INR: 1.06 ratio         PTT - ( 01 Feb 2023 10:56 )  PTT:30.1 sec          CAPILLARY BLOOD GLUCOSE

## 2023-02-01 NOTE — PATIENT PROFILE ADULT - FUNCTIONAL ASSESSMENT - BASIC MOBILITY 1.
Pt last seen 09/01/2020    emolliant lotion      Next Visit Date:  No future appointments.     Health Maintenance   Topic Date Due    Hepatitis C screen  Never done    Shingles Vaccine (1 of 2) Never done    Flu vaccine (Season Ended) 09/01/2021    Colon cancer screen fecal DNA test (Cologuard)  11/21/2022    Lipid screen  01/23/2024    DTaP/Tdap/Td vaccine (2 - Td) 01/07/2026    Pneumococcal 0-64 years Vaccine  Completed    COVID-19 Vaccine  Completed    HIV screen  Completed    Hepatitis A vaccine  Aged Out    Hepatitis B vaccine  Aged Out    Hib vaccine  Aged Out    Meningococcal (ACWY) vaccine  Aged Out       Hemoglobin A1C (%)   Date Value   01/23/2019 5.3   08/13/2015 5.2             ( goal A1C is < 7)   No results found for: LABMICR  LDL Cholesterol (mg/dL)   Date Value   01/23/2019 118   08/13/2015 96       (goal LDL is <100)   AST (U/L)   Date Value   11/02/2017 24     ALT (U/L)   Date Value   11/02/2017 43 (H)     BUN (mg/dL)   Date Value   06/27/2018 15     BP Readings from Last 3 Encounters:   09/01/20 (!) 150/98   10/29/19 (!) 132/92   09/05/19 131/81          (goal 120/80)    All Future Testing planned in [facility]  Lab Frequency Next Occurrence   Vitamin D 25 Hydroxy Once 06/25/2021               Patient Active Problem List:     Smoker     Alcohol abuse     Chronic back pain     GERD (gastroesophageal reflux disease)     Vitamin D deficiency     Carpal tunnel syndrome of right wrist     Dry skin     Allergic rhinitis     Regular astigmatism     Error, refractive, myopia     Presbyopia
4 = No assist / stand by assistance

## 2023-02-01 NOTE — ED PROVIDER NOTE - PROGRESS NOTE DETAILS
Otoniel Pathak, PGY4: Spoke w/ surgery resident who was anticipating patient's arrival to ED. They are requesting pre-op labs, continue Cipro/Flagyl, and admit to Dr. Parada. No additional imaging required.

## 2023-02-01 NOTE — ED PROVIDER NOTE - OBJECTIVE STATEMENT
82-year-old male PMH hypertension, BPH, presents to the emergency department for abdominal pain.  Patient was seen in the emergency department on January 18 and admitted through January 20 where he was found to have a 6.1 x 6.8 x 6.8 cm thick walled air collection in the anterior to sigmoid colon.  He was treated with ciprofloxacin and Flagyl and discharged home on the same antibiotics.  He states despite taking the antibiotics, he is still having intermittent pain and discomfort, and states he feels weak and rundown.  He had a repeat CT scan outpatient performed on Friday and was seen yesterday by Dr. Parada (general surgery).  Patient last took Cipro this morning.  He consumed 2 eggs and toast around 9 AM.  He denies fever, chills, nausea, vomiting, diarrhea, hematochezia, melena, or dysuria. 82-year-old male PMH hypertension, BPH, presents to the emergency department for abdominal pain.  Patient was seen in the emergency department on January 18 and admitted through January 20 where he was found to have a 6.1 x 6.8 x 6.8 cm thick-walled air collection in the anterior to sigmoid colon.  He was treated with ciprofloxacin and Flagyl and discharged home on the same antibiotics.  He states despite taking the antibiotics, he is still having intermittent pain and discomfort, and states he feels weak and rundown.  He had a repeat CT scan outpatient performed on Friday and was seen yesterday by Dr. Parada (general surgery).  Patient last took Cipro this morning.  He consumed 2 eggs and toast around 9 AM.  He denies fever, chills, nausea, vomiting, diarrhea, hematochezia, melena, or dysuria.

## 2023-02-01 NOTE — ED PROVIDER NOTE - CLINICAL SUMMARY MEDICAL DECISION MAKING FREE TEXT BOX
Otoniel Pathak, PGY4: 82 year old male w/ known colonic abscess presents with failure of conservative management. He is pending surgical intervention tomorrow. Last PO intake 9a. Plan for pre-op labs, ekg, cxr, admit. Otoniel Pathak, PGY4: 82 year old male w/ known colonic abscess presents with failure of conservative management. He is pending surgical intervention tomorrow. Last PO intake 9a. Plan for pre-op labs, ekg, cxr, admit.    Randolph ORO: confined perforation anterior to sigmoid colon, not responding to antibiotics. No resolution with time and conservative treatment. Plan for labs, IV antibiotics and admission. Patient to be admitted to Dr. Parada. No repeat imaging recommended.     CT from 1/18/23: Patient has a 6.1 x 6.8 x 6.8 cm thick-walled air collection noted   anterior to the sigmoid colon. This is causing significant infiltrative   changes extending to the dome of the urinary bladder without definitive   fistulization. These findings are suspicious for a confined perforation   without significant fluid within the air collection. Otoniel Pathak, PGY4: 82 year old male w/ known confined colonic perforation presents with failure of conservative management. He is pending surgical intervention tomorrow. Last PO intake 9a. Plan for pre-op labs, ekg, cxr, admit.    Randolph ORO: confined perforation anterior to sigmoid colon, not responding to antibiotics. No resolution with time and conservative treatment. Plan for labs, IV antibiotics and admission. Patient to be admitted to Dr. Paraad. No repeat imaging recommended.     CT from 1/18/23: Patient has a 6.1 x 6.8 x 6.8 cm thick-walled air collection noted   anterior to the sigmoid colon. This is causing significant infiltrative   changes extending to the dome of the urinary bladder without definitive   fistulization. These findings are suspicious for a confined perforation   without significant fluid within the air collection. Otoniel Pathak, PGY4: 82 year old male w/ known confined colonic perforation presents with failure of conservative management. He is pending surgical intervention tomorrow. Last PO intake 9a. Plan for pre-op labs, ekg, cxr, admit.    Randolph ORO: confined perforation anterior to sigmoid colon, not responding to antibiotics. No resolution with time and conservative treatment. Plan for labs, IV antibiotics and admission to surgery. Patient to be admitted to Dr. Parada. No repeat imaging recommended.     CT from 1/18/23: Patient has a 6.1 x 6.8 x 6.8 cm thick-walled air collection noted   anterior to the sigmoid colon. This is causing significant infiltrative   changes extending to the dome of the urinary bladder without definitive   fistulization. These findings are suspicious for a confined perforation   without significant fluid within the air collection.

## 2023-02-01 NOTE — H&P ADULT - NSHPPHYSICALEXAM_GEN_ALL_CORE
PHYSICAL EXAM  GENERAL: NAD, lying in bed comfortably  CHEST: nonlabored, no increased WOB  ABDOMEN: Soft, Nontender, Nondistended.  EXTREMITIES: Well perfused. No clubbing, cyanosis, or edema  NERVOUS SYSTEM:  Alert & Oriented X3

## 2023-02-01 NOTE — ED PROVIDER NOTE - DIFFERENTIAL DIAGNOSIS
DDx: colon abscess, diverticulitis Differential Diagnosis DDx: colon abscess, diverticulitis, perforation

## 2023-02-01 NOTE — ED PROVIDER NOTE - ATTENDING CONTRIBUTION TO CARE
Patient is an 82-year-old male with a history of hypertension, BPH sent in by his surgeon, Dr. Parada for admission for further treatment for confined colonic perforation. Patient was seen in the emergency department on 1/18/23 and was found to have a 6.1 x 6.8 x 6.8 cm thick walled air collection in the anterior sigmoid colon. He reports he took Cipro/ Flagyl for 10 days and saw Dr. Parada yesterday. He denies any fevers, chills, nausea, vomiting. He states he feels weak. He had a repeat CT Scan on Friday.    VS noted  Gen. no acute distress, Non toxic   HEENT: EOMI, mmm  Lungs: CTAB/L no C/ W /R   CVS: RRR   Abd; Soft non tender, non distended, no CVA tenderness bilaterally  Ext: no edema  Skin: no rash  Neuro AAOx3 non focal clear speech  a/p: confined perforation anterior to sigmoid colon, not responding to antibiotics. No resolution with time and conservative treatment. Plan for labs, IV antibiotics and admission. Patient to be admitted to Dr. Parada. No repeat imaging recommended.     CT from 1/18/23: Patient has a 6.1 x 6.8 x 6.8 cm thick-walled air collection noted   anterior to the sigmoid colon. This is causing significant infiltrative   changes extending to the dome of the urinary bladder without definitive   fistulization. These findings are suspicious for a confined perforation   without significant fluid within the air collection.    - [facility]

## 2023-02-02 LAB
ANION GAP SERPL CALC-SCNC: 11 MMOL/L — SIGNIFICANT CHANGE UP (ref 7–14)
ANION GAP SERPL CALC-SCNC: 12 MMOL/L — SIGNIFICANT CHANGE UP (ref 7–14)
APTT BLD: 32.9 SEC — SIGNIFICANT CHANGE UP (ref 27–36.3)
BUN SERPL-MCNC: 12 MG/DL — SIGNIFICANT CHANGE UP (ref 7–23)
BUN SERPL-MCNC: 12 MG/DL — SIGNIFICANT CHANGE UP (ref 7–23)
CALCIUM SERPL-MCNC: 8.8 MG/DL — SIGNIFICANT CHANGE UP (ref 8.4–10.5)
CALCIUM SERPL-MCNC: 9.1 MG/DL — SIGNIFICANT CHANGE UP (ref 8.4–10.5)
CHLORIDE SERPL-SCNC: 99 MMOL/L — SIGNIFICANT CHANGE UP (ref 98–107)
CHLORIDE SERPL-SCNC: 99 MMOL/L — SIGNIFICANT CHANGE UP (ref 98–107)
CO2 SERPL-SCNC: 21 MMOL/L — LOW (ref 22–31)
CO2 SERPL-SCNC: 22 MMOL/L — SIGNIFICANT CHANGE UP (ref 22–31)
CREAT SERPL-MCNC: 0.98 MG/DL — SIGNIFICANT CHANGE UP (ref 0.5–1.3)
CREAT SERPL-MCNC: 1.01 MG/DL — SIGNIFICANT CHANGE UP (ref 0.5–1.3)
EGFR: 74 ML/MIN/1.73M2 — SIGNIFICANT CHANGE UP
EGFR: 77 ML/MIN/1.73M2 — SIGNIFICANT CHANGE UP
GLUCOSE SERPL-MCNC: 187 MG/DL — HIGH (ref 70–99)
GLUCOSE SERPL-MCNC: 99 MG/DL — SIGNIFICANT CHANGE UP (ref 70–99)
HCT VFR BLD CALC: 40.9 % — SIGNIFICANT CHANGE UP (ref 39–50)
HCT VFR BLD CALC: 46 % — SIGNIFICANT CHANGE UP (ref 39–50)
HGB BLD-MCNC: 13.6 G/DL — SIGNIFICANT CHANGE UP (ref 13–17)
HGB BLD-MCNC: 14.8 G/DL — SIGNIFICANT CHANGE UP (ref 13–17)
INR BLD: 1.13 RATIO — SIGNIFICANT CHANGE UP (ref 0.88–1.16)
MAGNESIUM SERPL-MCNC: 1.9 MG/DL — SIGNIFICANT CHANGE UP (ref 1.6–2.6)
MAGNESIUM SERPL-MCNC: 2.3 MG/DL — SIGNIFICANT CHANGE UP (ref 1.6–2.6)
MCHC RBC-ENTMCNC: 31.4 PG — SIGNIFICANT CHANGE UP (ref 27–34)
MCHC RBC-ENTMCNC: 31.5 PG — SIGNIFICANT CHANGE UP (ref 27–34)
MCHC RBC-ENTMCNC: 32.2 GM/DL — SIGNIFICANT CHANGE UP (ref 32–36)
MCHC RBC-ENTMCNC: 33.3 GM/DL — SIGNIFICANT CHANGE UP (ref 32–36)
MCV RBC AUTO: 94.7 FL — SIGNIFICANT CHANGE UP (ref 80–100)
MCV RBC AUTO: 97.5 FL — SIGNIFICANT CHANGE UP (ref 80–100)
NRBC # BLD: 0 /100 WBCS — SIGNIFICANT CHANGE UP (ref 0–0)
NRBC # BLD: 0 /100 WBCS — SIGNIFICANT CHANGE UP (ref 0–0)
NRBC # FLD: 0 K/UL — SIGNIFICANT CHANGE UP (ref 0–0)
NRBC # FLD: 0 K/UL — SIGNIFICANT CHANGE UP (ref 0–0)
PHOSPHATE SERPL-MCNC: 2.6 MG/DL — SIGNIFICANT CHANGE UP (ref 2.5–4.5)
PHOSPHATE SERPL-MCNC: 2.9 MG/DL — SIGNIFICANT CHANGE UP (ref 2.5–4.5)
PLATELET # BLD AUTO: 298 K/UL — SIGNIFICANT CHANGE UP (ref 150–400)
PLATELET # BLD AUTO: 326 K/UL — SIGNIFICANT CHANGE UP (ref 150–400)
POTASSIUM SERPL-MCNC: 3.7 MMOL/L — SIGNIFICANT CHANGE UP (ref 3.5–5.3)
POTASSIUM SERPL-MCNC: 4.1 MMOL/L — SIGNIFICANT CHANGE UP (ref 3.5–5.3)
POTASSIUM SERPL-SCNC: 3.7 MMOL/L — SIGNIFICANT CHANGE UP (ref 3.5–5.3)
POTASSIUM SERPL-SCNC: 4.1 MMOL/L — SIGNIFICANT CHANGE UP (ref 3.5–5.3)
PROTHROM AB SERPL-ACNC: 13.1 SEC — SIGNIFICANT CHANGE UP (ref 10.5–13.4)
RBC # BLD: 4.32 M/UL — SIGNIFICANT CHANGE UP (ref 4.2–5.8)
RBC # BLD: 4.72 M/UL — SIGNIFICANT CHANGE UP (ref 4.2–5.8)
RBC # FLD: 13 % — SIGNIFICANT CHANGE UP (ref 10.3–14.5)
RBC # FLD: 13.1 % — SIGNIFICANT CHANGE UP (ref 10.3–14.5)
SODIUM SERPL-SCNC: 132 MMOL/L — LOW (ref 135–145)
SODIUM SERPL-SCNC: 132 MMOL/L — LOW (ref 135–145)
WBC # BLD: 13.18 K/UL — HIGH (ref 3.8–10.5)
WBC # BLD: 6.88 K/UL — SIGNIFICANT CHANGE UP (ref 3.8–10.5)
WBC # FLD AUTO: 13.18 K/UL — HIGH (ref 3.8–10.5)
WBC # FLD AUTO: 6.88 K/UL — SIGNIFICANT CHANGE UP (ref 3.8–10.5)

## 2023-02-02 PROCEDURE — 88304 TISSUE EXAM BY PATHOLOGIST: CPT | Mod: 26

## 2023-02-02 PROCEDURE — 88307 TISSUE EXAM BY PATHOLOGIST: CPT | Mod: 26

## 2023-02-02 RX ORDER — ACETAMINOPHEN 500 MG
1000 TABLET ORAL EVERY 6 HOURS
Refills: 0 | Status: COMPLETED | OUTPATIENT
Start: 2023-02-02 | End: 2023-02-03

## 2023-02-02 RX ORDER — ONDANSETRON 8 MG/1
4 TABLET, FILM COATED ORAL ONCE
Refills: 0 | Status: COMPLETED | OUTPATIENT
Start: 2023-02-02 | End: 2023-02-02

## 2023-02-02 RX ORDER — CIPROFLOXACIN LACTATE 400MG/40ML
400 VIAL (ML) INTRAVENOUS EVERY 12 HOURS
Refills: 0 | Status: DISCONTINUED | OUTPATIENT
Start: 2023-02-03 | End: 2023-02-04

## 2023-02-02 RX ORDER — HYDROMORPHONE HYDROCHLORIDE 2 MG/ML
0.5 INJECTION INTRAMUSCULAR; INTRAVENOUS; SUBCUTANEOUS
Refills: 0 | Status: DISCONTINUED | OUTPATIENT
Start: 2023-02-02 | End: 2023-02-02

## 2023-02-02 RX ORDER — CIPROFLOXACIN LACTATE 400MG/40ML
VIAL (ML) INTRAVENOUS
Refills: 0 | Status: DISCONTINUED | OUTPATIENT
Start: 2023-02-02 | End: 2023-02-04

## 2023-02-02 RX ORDER — POTASSIUM CHLORIDE 20 MEQ
10 PACKET (EA) ORAL
Refills: 0 | Status: COMPLETED | OUTPATIENT
Start: 2023-02-02 | End: 2023-02-02

## 2023-02-02 RX ORDER — HYDROMORPHONE HYDROCHLORIDE 2 MG/ML
30 INJECTION INTRAMUSCULAR; INTRAVENOUS; SUBCUTANEOUS
Refills: 0 | Status: DISCONTINUED | OUTPATIENT
Start: 2023-02-02 | End: 2023-02-02

## 2023-02-02 RX ORDER — HYDROMORPHONE HYDROCHLORIDE 2 MG/ML
30 INJECTION INTRAMUSCULAR; INTRAVENOUS; SUBCUTANEOUS
Refills: 0 | Status: DISCONTINUED | OUTPATIENT
Start: 2023-02-02 | End: 2023-02-03

## 2023-02-02 RX ORDER — HYDROMORPHONE HYDROCHLORIDE 2 MG/ML
0.5 INJECTION INTRAMUSCULAR; INTRAVENOUS; SUBCUTANEOUS ONCE
Refills: 0 | Status: DISCONTINUED | OUTPATIENT
Start: 2023-02-02 | End: 2023-02-02

## 2023-02-02 RX ORDER — METOCLOPRAMIDE HCL 10 MG
10 TABLET ORAL ONCE
Refills: 0 | Status: DISCONTINUED | OUTPATIENT
Start: 2023-02-02 | End: 2023-02-06

## 2023-02-02 RX ORDER — METRONIDAZOLE 500 MG
500 TABLET ORAL ONCE
Refills: 0 | Status: COMPLETED | OUTPATIENT
Start: 2023-02-02 | End: 2023-02-02

## 2023-02-02 RX ORDER — METRONIDAZOLE 500 MG
TABLET ORAL
Refills: 0 | Status: DISCONTINUED | OUTPATIENT
Start: 2023-02-02 | End: 2023-02-08

## 2023-02-02 RX ORDER — CIPROFLOXACIN LACTATE 400MG/40ML
400 VIAL (ML) INTRAVENOUS ONCE
Refills: 0 | Status: COMPLETED | OUTPATIENT
Start: 2023-02-02 | End: 2023-02-02

## 2023-02-02 RX ORDER — METRONIDAZOLE 500 MG
500 TABLET ORAL EVERY 8 HOURS
Refills: 0 | Status: DISCONTINUED | OUTPATIENT
Start: 2023-02-02 | End: 2023-02-08

## 2023-02-02 RX ORDER — NALOXONE HYDROCHLORIDE 4 MG/.1ML
0.1 SPRAY NASAL
Refills: 0 | Status: DISCONTINUED | OUTPATIENT
Start: 2023-02-02 | End: 2023-02-09

## 2023-02-02 RX ORDER — ONDANSETRON 8 MG/1
4 TABLET, FILM COATED ORAL EVERY 6 HOURS
Refills: 0 | Status: DISCONTINUED | OUTPATIENT
Start: 2023-02-02 | End: 2023-02-08

## 2023-02-02 RX ORDER — HYDROMORPHONE HYDROCHLORIDE 2 MG/ML
0.5 INJECTION INTRAMUSCULAR; INTRAVENOUS; SUBCUTANEOUS
Refills: 0 | Status: DISCONTINUED | OUTPATIENT
Start: 2023-02-02 | End: 2023-02-03

## 2023-02-02 RX ORDER — MAGNESIUM SULFATE 500 MG/ML
1 VIAL (ML) INJECTION ONCE
Refills: 0 | Status: COMPLETED | OUTPATIENT
Start: 2023-02-02 | End: 2023-02-02

## 2023-02-02 RX ADMIN — Medication 100 MILLIGRAM(S): at 19:08

## 2023-02-02 RX ADMIN — Medication 500 MILLIGRAM(S): at 05:17

## 2023-02-02 RX ADMIN — Medication 200 MILLIGRAM(S): at 18:00

## 2023-02-02 RX ADMIN — Medication 63.75 MILLIMOLE(S): at 10:30

## 2023-02-02 RX ADMIN — Medication 100 MILLIEQUIVALENT(S): at 23:08

## 2023-02-02 RX ADMIN — Medication 400 MILLIGRAM(S): at 21:58

## 2023-02-02 RX ADMIN — ENOXAPARIN SODIUM 40 MILLIGRAM(S): 100 INJECTION SUBCUTANEOUS at 00:08

## 2023-02-02 RX ADMIN — HYDROMORPHONE HYDROCHLORIDE 0.5 MILLIGRAM(S): 2 INJECTION INTRAMUSCULAR; INTRAVENOUS; SUBCUTANEOUS at 18:10

## 2023-02-02 RX ADMIN — ONDANSETRON 4 MILLIGRAM(S): 8 TABLET, FILM COATED ORAL at 00:02

## 2023-02-02 RX ADMIN — HYDROMORPHONE HYDROCHLORIDE 30 MILLILITER(S): 2 INJECTION INTRAMUSCULAR; INTRAVENOUS; SUBCUTANEOUS at 17:31

## 2023-02-02 RX ADMIN — Medication 100 MILLIEQUIVALENT(S): at 22:04

## 2023-02-02 RX ADMIN — HYDROMORPHONE HYDROCHLORIDE 0.5 MILLIGRAM(S): 2 INJECTION INTRAMUSCULAR; INTRAVENOUS; SUBCUTANEOUS at 17:10

## 2023-02-02 RX ADMIN — HYDROMORPHONE HYDROCHLORIDE 30 MILLILITER(S): 2 INJECTION INTRAMUSCULAR; INTRAVENOUS; SUBCUTANEOUS at 19:06

## 2023-02-02 RX ADMIN — ONDANSETRON 4 MILLIGRAM(S): 8 TABLET, FILM COATED ORAL at 05:45

## 2023-02-02 RX ADMIN — ONDANSETRON 4 MILLIGRAM(S): 8 TABLET, FILM COATED ORAL at 16:11

## 2023-02-02 RX ADMIN — Medication 100 MILLIEQUIVALENT(S): at 23:58

## 2023-02-02 RX ADMIN — SODIUM CHLORIDE 125 MILLILITER(S): 9 INJECTION, SOLUTION INTRAVENOUS at 17:13

## 2023-02-02 RX ADMIN — HYDROMORPHONE HYDROCHLORIDE 30 MILLILITER(S): 2 INJECTION INTRAMUSCULAR; INTRAVENOUS; SUBCUTANEOUS at 21:34

## 2023-02-02 RX ADMIN — Medication 200 MILLIGRAM(S): at 17:04

## 2023-02-02 NOTE — BRIEF OPERATIVE NOTE - OPERATION/FINDINGS
ureteral catheter placement  peritoneum closed over ureter
s/p Lap LAR with colorectal anastomosis, negative air leak test, splenic flexure mobilization, drainage pelvic abscess, take down colovesicular fistula, partial bladder mobilization and primary repair in two layers, left ureterolysis, cystoscopy.

## 2023-02-02 NOTE — ED ADULT NURSE REASSESSMENT NOTE - NS ED NURSE REASSESS COMMENT FT1
Break RN: Respirations even and unlabored, no signs/symptoms of acute distress. Patient denies pain and offers no complaints at this time. Medications administered as precribed, safety measures in place and call bell within reach.
Resting on stretcher. Denies any pain, Not in distress. Vitals stable. safety maintained. report given to 848B. await transport
Pt aaox4, ambulatory, laying in bed breathing even and unlabored. Pt denies SOB, chest pain, N/V/D, pain, dizziness, and headache. Bed in lowest position, call bell within reach, wife at bedside.
Resting comfortably on bed. Denies any pain, not in acute distress. Vitals are stable. Alert and oriented x 4, safety maintained.

## 2023-02-02 NOTE — BRIEF OPERATIVE NOTE - NSICDXBRIEFPROCEDURE_GEN_ALL_CORE_FT
PROCEDURES:  Cystoscopy 03-Feb-2023 11:21:27  Alan Burgos  
PROCEDURES:  Repair, fistula, colovesical 02-Feb-2023 16:29:07  Yoseph Betancur

## 2023-02-02 NOTE — PROGRESS NOTE ADULT - SUBJECTIVE AND OBJECTIVE BOX
Colorectal Surgery Daily Progress Note      SUBJECTIVE: Pt seen and examined by team on morning rounds. No acute events overnight. Awaiting OR today at 11am, completed bowel prep. Denies chest pain, SOB, palpitations, dizziness, N/V/D/ fever, chills.    Vital Signs Last 24 Hrs  T(C): 37.1 (02 Feb 2023 05:10), Max: 37.1 (02 Feb 2023 05:10)  T(F): 98.7 (02 Feb 2023 05:10), Max: 98.7 (02 Feb 2023 05:10)  HR: 70 (02 Feb 2023 05:10) (53 - 70)  BP: 133/59 (02 Feb 2023 05:10) (109/53 - 143/60)  RR: 16 (02 Feb 2023 05:10) (14 - 18)  SpO2: 97% (02 Feb 2023 05:10) (97% - 100%)    Parameters below as of 02 Feb 2023 05:10  Patient On (Oxygen Delivery Method): room air      General Appearance: Appears well, NAD  Neck: Supple  Chest: Equal expansion bilaterally, equal breath sounds  CV: Pulse regular presently  Abdomen: Soft, nontender, nondistended  Extremities: Grossly symmetric, SCD's in place     I&O's Summary    01 Feb 2023 07:01  -  02 Feb 2023 07:00  --------------------------------------------------------  IN: 685 mL / OUT: 200 mL / NET: 485 mL      I&O's Detail    01 Feb 2023 07:01  -  02 Feb 2023 07:00  --------------------------------------------------------  IN:    Lactated Ringers: 625 mL    Oral Fluid: 60 mL  Total IN: 685 mL    OUT:    Voided (mL): 200 mL  Total OUT: 200 mL    Total NET: 485 mL          MEDICATIONS  (STANDING):  amLODIPine   Tablet 2.5 milliGRAM(s) Oral daily  enoxaparin Injectable 40 milliGRAM(s) SubCutaneous every 24 hours  finasteride 5 milliGRAM(s) Oral daily  lactated ringers. 1000 milliLiter(s) (125 mL/Hr) IV Continuous <Continuous>  magnesium sulfate  IVPB 1 Gram(s) IV Intermittent once  pantoprazole  Injectable 40 milliGRAM(s) IV Push daily  sodium phosphate 15 milliMole(s)/250 mL IVPB 15 milliMole(s) IV Intermittent once    MEDICATIONS  (PRN):      LABS:                        13.6   6.88  )-----------( 326      ( 02 Feb 2023 06:30 )             40.9     02-02    132<L>  |  99  |  12  ----------------------------<  99  4.1   |  22  |  1.01    Ca    9.1      02 Feb 2023 06:30  Phos  2.6     02-02  Mg     1.90     02-02    TPro  7.4  /  Alb  4.1  /  TBili  0.4  /  DBili  x   /  AST  34  /  ALT  62<H>  /  AlkPhos  103  02-01    PT/INR - ( 02 Feb 2023 06:30 )   PT: 13.1 sec;   INR: 1.13 ratio         PTT - ( 02 Feb 2023 06:30 )  PTT:32.9 sec      RADIOLOGY & ADDITIONAL STUDIES:

## 2023-02-02 NOTE — PROGRESS NOTE ADULT - ASSESSMENT
82M w h/o HTN, HLD, BPH, GERD, vertigo, glaucoma, with recent admission 1/18 for diverticulitis with large 6x6x6 diverticulum, managed non-op, now presenting with persistent weakness and decreased PO intake, concerning for refractory diverticulitis    - OR today with Dr. Parada  - repletions PRN  - NPO/IVF  - pain control PRN  - DVT Ppx with SQH  - POC this afternoon    A Team Surgery   [id number]

## 2023-02-03 ENCOUNTER — APPOINTMENT (OUTPATIENT)
Dept: INTERNAL MEDICINE | Facility: CLINIC | Age: 83
End: 2023-02-03

## 2023-02-03 LAB
ANION GAP SERPL CALC-SCNC: 8 MMOL/L — SIGNIFICANT CHANGE UP (ref 7–14)
BUN SERPL-MCNC: 12 MG/DL — SIGNIFICANT CHANGE UP (ref 7–23)
CALCIUM SERPL-MCNC: 8.4 MG/DL — SIGNIFICANT CHANGE UP (ref 8.4–10.5)
CHLORIDE SERPL-SCNC: 100 MMOL/L — SIGNIFICANT CHANGE UP (ref 98–107)
CO2 SERPL-SCNC: 22 MMOL/L — SIGNIFICANT CHANGE UP (ref 22–31)
CREAT SERPL-MCNC: 0.96 MG/DL — SIGNIFICANT CHANGE UP (ref 0.5–1.3)
EGFR: 79 ML/MIN/1.73M2 — SIGNIFICANT CHANGE UP
GLUCOSE SERPL-MCNC: 120 MG/DL — HIGH (ref 70–99)
HCT VFR BLD CALC: 37.8 % — LOW (ref 39–50)
HGB BLD-MCNC: 12.4 G/DL — LOW (ref 13–17)
MAGNESIUM SERPL-MCNC: 2.1 MG/DL — SIGNIFICANT CHANGE UP (ref 1.6–2.6)
MCHC RBC-ENTMCNC: 31.5 PG — SIGNIFICANT CHANGE UP (ref 27–34)
MCHC RBC-ENTMCNC: 32.8 GM/DL — SIGNIFICANT CHANGE UP (ref 32–36)
MCV RBC AUTO: 95.9 FL — SIGNIFICANT CHANGE UP (ref 80–100)
NRBC # BLD: 0 /100 WBCS — SIGNIFICANT CHANGE UP (ref 0–0)
NRBC # FLD: 0 K/UL — SIGNIFICANT CHANGE UP (ref 0–0)
PHOSPHATE SERPL-MCNC: 2.8 MG/DL — SIGNIFICANT CHANGE UP (ref 2.5–4.5)
PLATELET # BLD AUTO: 321 K/UL — SIGNIFICANT CHANGE UP (ref 150–400)
POTASSIUM SERPL-MCNC: 4.6 MMOL/L — SIGNIFICANT CHANGE UP (ref 3.5–5.3)
POTASSIUM SERPL-SCNC: 4.6 MMOL/L — SIGNIFICANT CHANGE UP (ref 3.5–5.3)
RBC # BLD: 3.94 M/UL — LOW (ref 4.2–5.8)
RBC # FLD: 13 % — SIGNIFICANT CHANGE UP (ref 10.3–14.5)
SODIUM SERPL-SCNC: 130 MMOL/L — LOW (ref 135–145)
WBC # BLD: 10.81 K/UL — HIGH (ref 3.8–10.5)
WBC # FLD AUTO: 10.81 K/UL — HIGH (ref 3.8–10.5)

## 2023-02-03 RX ORDER — DEXTROSE MONOHYDRATE, SODIUM CHLORIDE, AND POTASSIUM CHLORIDE 50; .745; 4.5 G/1000ML; G/1000ML; G/1000ML
1000 INJECTION, SOLUTION INTRAVENOUS
Refills: 0 | Status: DISCONTINUED | OUTPATIENT
Start: 2023-02-03 | End: 2023-02-03

## 2023-02-03 RX ORDER — SODIUM CHLORIDE 9 MG/ML
1000 INJECTION, SOLUTION INTRAVENOUS
Refills: 0 | Status: DISCONTINUED | OUTPATIENT
Start: 2023-02-03 | End: 2023-02-07

## 2023-02-03 RX ORDER — HYDROMORPHONE HYDROCHLORIDE 2 MG/ML
30 INJECTION INTRAMUSCULAR; INTRAVENOUS; SUBCUTANEOUS
Refills: 0 | Status: DISCONTINUED | OUTPATIENT
Start: 2023-02-03 | End: 2023-02-06

## 2023-02-03 RX ORDER — OXYBUTYNIN CHLORIDE 5 MG
5 TABLET ORAL
Refills: 0 | Status: DISCONTINUED | OUTPATIENT
Start: 2023-02-03 | End: 2023-02-04

## 2023-02-03 RX ADMIN — AMLODIPINE BESYLATE 2.5 MILLIGRAM(S): 2.5 TABLET ORAL at 06:41

## 2023-02-03 RX ADMIN — Medication 1000 MILLIGRAM(S): at 12:48

## 2023-02-03 RX ADMIN — Medication 100 MILLIGRAM(S): at 02:49

## 2023-02-03 RX ADMIN — Medication 400 MILLIGRAM(S): at 12:18

## 2023-02-03 RX ADMIN — Medication 100 MILLIGRAM(S): at 19:02

## 2023-02-03 RX ADMIN — HYDROMORPHONE HYDROCHLORIDE 30 MILLILITER(S): 2 INJECTION INTRAMUSCULAR; INTRAVENOUS; SUBCUTANEOUS at 20:02

## 2023-02-03 RX ADMIN — Medication 400 MILLIGRAM(S): at 03:15

## 2023-02-03 RX ADMIN — HYDROMORPHONE HYDROCHLORIDE 30 MILLILITER(S): 2 INJECTION INTRAMUSCULAR; INTRAVENOUS; SUBCUTANEOUS at 08:24

## 2023-02-03 RX ADMIN — ONDANSETRON 4 MILLIGRAM(S): 8 TABLET, FILM COATED ORAL at 07:32

## 2023-02-03 RX ADMIN — Medication 200 MILLIGRAM(S): at 07:01

## 2023-02-03 RX ADMIN — Medication 400 MILLIGRAM(S): at 20:13

## 2023-02-03 RX ADMIN — HYDROMORPHONE HYDROCHLORIDE 30 MILLILITER(S): 2 INJECTION INTRAMUSCULAR; INTRAVENOUS; SUBCUTANEOUS at 15:01

## 2023-02-03 RX ADMIN — Medication 200 MILLIGRAM(S): at 20:13

## 2023-02-03 RX ADMIN — Medication 1000 MILLIGRAM(S): at 20:43

## 2023-02-03 RX ADMIN — ENOXAPARIN SODIUM 40 MILLIGRAM(S): 100 INJECTION SUBCUTANEOUS at 00:31

## 2023-02-03 RX ADMIN — Medication 1000 MILLIGRAM(S): at 03:45

## 2023-02-03 RX ADMIN — FINASTERIDE 5 MILLIGRAM(S): 5 TABLET, FILM COATED ORAL at 12:18

## 2023-02-03 RX ADMIN — PANTOPRAZOLE SODIUM 40 MILLIGRAM(S): 20 TABLET, DELAYED RELEASE ORAL at 12:18

## 2023-02-03 RX ADMIN — Medication 100 MILLIGRAM(S): at 11:02

## 2023-02-03 RX ADMIN — Medication 63.75 MILLIMOLE(S): at 11:01

## 2023-02-03 NOTE — PROGRESS NOTE ADULT - NS ATTEND AMEND GEN_ALL_CORE FT
Pt seen/examined, agree with above.    - PT for ambulation  -  follow up to manage bladder discomfort/spasms  - ice chips/sips of water

## 2023-02-03 NOTE — PROGRESS NOTE ADULT - SUBJECTIVE AND OBJECTIVE BOX
Colorectal Surgery Daily Progress Note      SUBJECTIVE: Pt seen and examined by team on morning rounds. No acute events overnight. Complaining of discomfort and pressure in his bladder. Appreciates some relief after using PCA pump. Denies SOB, chest pain, dizziness, palpitations, fever, chills, N/V/D.    Vital Signs Last 24 Hrs  T(C): 36.9 (03 Feb 2023 06:37), Max: 37.3 (02 Feb 2023 20:00)  T(F): 98.4 (03 Feb 2023 06:37), Max: 99.2 (02 Feb 2023 20:00)  HR: 70 (03 Feb 2023 06:37) (59 - 73)  BP: 137/69 (03 Feb 2023 06:37) (107/49 - 149/65)  BP(mean): 78 (02 Feb 2023 20:45) (59 - 86)  RR: 16 (03 Feb 2023 06:37) (12 - 20)  SpO2: 98% (03 Feb 2023 06:37) (95% - 100%)    Parameters below as of 03 Feb 2023 06:37  Patient On (Oxygen Delivery Method): room air      General Appearance: Appears well, NAD  Neck: Supple  Chest: Equal expansion bilaterally, equal breath sounds  CV: Pulse regular presently  Abdomen: Soft, appropriate incisional tenderness, dressings clean and dry and intact  Extremities: Grossly symmetric, SCD's in place     I&O's Summary    02 Feb 2023 07:01  -  03 Feb 2023 07:00  --------------------------------------------------------  IN: 1125 mL / OUT: 1100 mL / NET: 25 mL      I&O's Detail    02 Feb 2023 07:01  -  03 Feb 2023 07:00  --------------------------------------------------------  IN:    Lactated Ringers: 1125 mL  Total IN: 1125 mL    OUT:    Bulb (mL): 75 mL    Indwelling Catheter - Urethral (mL): 1025 mL    Oral Fluid: 0 mL  Total OUT: 1100 mL    Total NET: 25 mL          MEDICATIONS  (STANDING):  acetaminophen   IVPB .. 1000 milliGRAM(s) IV Intermittent every 6 hours  amLODIPine   Tablet 2.5 milliGRAM(s) Oral daily  ciprofloxacin   IVPB      ciprofloxacin   IVPB 400 milliGRAM(s) IV Intermittent every 12 hours  enoxaparin Injectable 40 milliGRAM(s) SubCutaneous every 24 hours  finasteride 5 milliGRAM(s) Oral daily  HYDROmorphone PCA (1 mG/mL) 30 milliLiter(s) PCA Continuous PCA Continuous  lactated ringers. 1000 milliLiter(s) (125 mL/Hr) IV Continuous <Continuous>  metoclopramide 10 milliGRAM(s) Oral once  metroNIDAZOLE  IVPB      metroNIDAZOLE  IVPB 500 milliGRAM(s) IV Intermittent every 8 hours  pantoprazole  Injectable 40 milliGRAM(s) IV Push daily    MEDICATIONS  (PRN):  HYDROmorphone PCA (1 mG/mL) Rescue Clinician Bolus 0.5 milliGRAM(s) IV Push every 15 minutes PRN for Pain Scale GREATER THAN 6  naloxone Injectable 0.1 milliGRAM(s) IV Push every 3 minutes PRN For ANY of the following changes in patient status:  A. RR LESS THAN 10 breaths per minute, B. Oxygen saturation LESS THAN 90%, C. Sedation score of 6  ondansetron Injectable 4 milliGRAM(s) IV Push every 6 hours PRN Nausea      LABS:                        14.8   13.18 )-----------( 298      ( 02 Feb 2023 17:55 )             46.0     02-02    132<L>  |  99  |  12  ----------------------------<  187<H>  3.7   |  21<L>  |  0.98    Ca    8.8      02 Feb 2023 17:55  Phos  2.9     02-02  Mg     2.30     02-02    TPro  7.4  /  Alb  4.1  /  TBili  0.4  /  DBili  x   /  AST  34  /  ALT  62<H>  /  AlkPhos  103  02-01    PT/INR - ( 02 Feb 2023 06:30 )   PT: 13.1 sec;   INR: 1.13 ratio         PTT - ( 02 Feb 2023 06:30 )  PTT:32.9 sec      RADIOLOGY & ADDITIONAL STUDIES:

## 2023-02-03 NOTE — PROGRESS NOTE ADULT - SUBJECTIVE AND OBJECTIVE BOX
Anesthesia Pain Management Service- Attending Addendum    SUBJECTIVE: Pt doing well with IV PCA without problems reported.    Therapy:	  [ X] IV PCA	   [ ] Epidural           [ ] s/p Spinal Opoid              [ ] Postpartum infusion	  [ ] Patient controlled regional anesthesia (PCRA)    [ ] prn Analgesics    Allergies    No Known Allergies    Intolerances      MEDICATIONS  (STANDING):  acetaminophen   IVPB .. 1000 milliGRAM(s) IV Intermittent every 6 hours  amLODIPine   Tablet 2.5 milliGRAM(s) Oral daily  ciprofloxacin   IVPB      ciprofloxacin   IVPB 400 milliGRAM(s) IV Intermittent every 12 hours  dextrose 5% + sodium chloride 0.9%. 1000 milliLiter(s) (100 mL/Hr) IV Continuous <Continuous>  enoxaparin Injectable 40 milliGRAM(s) SubCutaneous every 24 hours  finasteride 5 milliGRAM(s) Oral daily  HYDROmorphone PCA (1 mG/mL) 30 milliLiter(s) PCA Continuous PCA Continuous  metoclopramide 10 milliGRAM(s) Oral once  metroNIDAZOLE  IVPB      metroNIDAZOLE  IVPB 500 milliGRAM(s) IV Intermittent every 8 hours  pantoprazole  Injectable 40 milliGRAM(s) IV Push daily  sodium phosphate 15 milliMole(s)/250 mL IVPB 15 milliMole(s) IV Intermittent once    MEDICATIONS  (PRN):  HYDROmorphone PCA (1 mG/mL) Rescue Clinician Bolus 0.5 milliGRAM(s) IV Push every 15 minutes PRN for Pain Scale GREATER THAN 6  naloxone Injectable 0.1 milliGRAM(s) IV Push every 3 minutes PRN For ANY of the following changes in patient status:  A. RR LESS THAN 10 breaths per minute, B. Oxygen saturation LESS THAN 90%, C. Sedation score of 6  ondansetron Injectable 4 milliGRAM(s) IV Push every 6 hours PRN Nausea      OBJECTIVE:   [X] No new signs     [ ] Other:    Side Effects:  [X ] None			[ ] Other:    Assessment of Catheter Site:		[ ] Intact		[ ] Other:    ASSESSMENT/PLAN  [ X] Continue current therapy    [ ] Therapy changed to:    [ ] IV PCA       [ ] Epidural     [ ] prn Analgesics     Comments:    Note entered after patient seen

## 2023-02-03 NOTE — PROGRESS NOTE ADULT - SUBJECTIVE AND OBJECTIVE BOX
Anesthesia Pain Management Service    SUBJECTIVE: Patient is doing well with IV PCA and no significant problems reported. Patient reports some nausea states he has vertigo and he believes the nausea is associated with his vertigo. Patient denies taking medications at home for Vertigo.    Pain Scale Score	At rest: __3/10_ 	With Activity: ___ 	[X ] Refer to charted pain scores    THERAPY:    [ ] IV PCA Morphine		[ ] 5 mg/mL	[ ] 1 mg/mL  [X ] IV PCA Hydromorphone	[ ] 5 mg/mL	[X ] 1 mg/mL  [ ] IV PCA Fentanyl		[ ] 50 micrograms/mL    Demand dose __0.2_ lockout __6_ (minutes) Continuous Rate _0__ Total: _4.5__  mg used (in past 24 hours)      MEDICATIONS  (STANDING):  acetaminophen   IVPB .. 1000 milliGRAM(s) IV Intermittent every 6 hours  amLODIPine   Tablet 2.5 milliGRAM(s) Oral daily  ciprofloxacin   IVPB      ciprofloxacin   IVPB 400 milliGRAM(s) IV Intermittent every 12 hours  dextrose 5% + sodium chloride 0.9%. 1000 milliLiter(s) (100 mL/Hr) IV Continuous <Continuous>  enoxaparin Injectable 40 milliGRAM(s) SubCutaneous every 24 hours  finasteride 5 milliGRAM(s) Oral daily  HYDROmorphone PCA (1 mG/mL) 30 milliLiter(s) PCA Continuous PCA Continuous  metoclopramide 10 milliGRAM(s) Oral once  metroNIDAZOLE  IVPB      metroNIDAZOLE  IVPB 500 milliGRAM(s) IV Intermittent every 8 hours  pantoprazole  Injectable 40 milliGRAM(s) IV Push daily  sodium phosphate 15 milliMole(s)/250 mL IVPB 15 milliMole(s) IV Intermittent once    MEDICATIONS  (PRN):  HYDROmorphone PCA (1 mG/mL) Rescue Clinician Bolus 0.5 milliGRAM(s) IV Push every 15 minutes PRN for Pain Scale GREATER THAN 6  naloxone Injectable 0.1 milliGRAM(s) IV Push every 3 minutes PRN For ANY of the following changes in patient status:  A. RR LESS THAN 10 breaths per minute, B. Oxygen saturation LESS THAN 90%, C. Sedation score of 6  ondansetron Injectable 4 milliGRAM(s) IV Push every 6 hours PRN Nausea      OBJECTIVE: Patient laying in bed.    Sedation Score:	[ X] Alert	[ ] Drowsy 	[ ] Arousable	[ ] Asleep	[ ] Unresponsive    Side Effects:	[X ] None	[ ] Nausea	[ ] Vomiting	[ ] Pruritus  		[ ] Other:    Vital Signs Last 24 Hrs  T(C): 36.9 (03 Feb 2023 06:37), Max: 37.3 (02 Feb 2023 20:00)  T(F): 98.4 (03 Feb 2023 06:37), Max: 99.2 (02 Feb 2023 20:00)  HR: 70 (03 Feb 2023 06:37) (59 - 73)  BP: 137/69 (03 Feb 2023 06:37) (107/49 - 149/65)  BP(mean): 78 (02 Feb 2023 20:45) (59 - 86)  RR: 16 (03 Feb 2023 06:37) (12 - 20)  SpO2: 98% (03 Feb 2023 06:37) (95% - 100%)    Parameters below as of 03 Feb 2023 06:37  Patient On (Oxygen Delivery Method): room air        ASSESSMENT/ PLAN    Therapy to  be:	[ X] Continue   [ ] Discontinued   [ ] Change to prn Analgesics    Documentation and Verification of current medications:   [X] Done	[ ] Not done, not elligible    Comments: Continue IV PCA. Recommend non-opioid adjuvant analgesics to be used when possible and when allowed by primary surgical team.    Progress Note written now but Patient was seen earlier.

## 2023-02-03 NOTE — PROGRESS NOTE ADULT - ASSESSMENT
82M w h/o HTN, HLD, BPH, GERD, vertigo, glaucoma, with recent admission 1/18 for diverticulitis with large 6x6x6 diverticulum, managed non-op, now presenting with persistent weakness and decreased PO intake, concerning for refractory diverticulitis now s/p LAR with colorectal anastomosis, colovesicular fistula takedown, and pelvic abscess drainage, L ureterolysis & cystoscopy.    PLAN:  - Pain control: IV Tylenol & PCA pump  - Encourage IS  - Diet: NPO with sips and chips/IVF  - Monitor I+Os  - OOB/ Ambulate  - DVT ppx: Lovenox  - cont IV cipro/flagyl  - nix to stay for 5 days    A Team Surgery  [id number]

## 2023-02-04 LAB
ANION GAP SERPL CALC-SCNC: 6 MMOL/L — LOW (ref 7–14)
BUN SERPL-MCNC: 14 MG/DL — SIGNIFICANT CHANGE UP (ref 7–23)
CALCIUM SERPL-MCNC: 8.7 MG/DL — SIGNIFICANT CHANGE UP (ref 8.4–10.5)
CHLORIDE SERPL-SCNC: 104 MMOL/L — SIGNIFICANT CHANGE UP (ref 98–107)
CO2 SERPL-SCNC: 25 MMOL/L — SIGNIFICANT CHANGE UP (ref 22–31)
CREAT SERPL-MCNC: 0.89 MG/DL — SIGNIFICANT CHANGE UP (ref 0.5–1.3)
EGFR: 86 ML/MIN/1.73M2 — SIGNIFICANT CHANGE UP
GLUCOSE SERPL-MCNC: 109 MG/DL — HIGH (ref 70–99)
HCT VFR BLD CALC: 37.7 % — LOW (ref 39–50)
HGB BLD-MCNC: 12.2 G/DL — LOW (ref 13–17)
MAGNESIUM SERPL-MCNC: 2.1 MG/DL — SIGNIFICANT CHANGE UP (ref 1.6–2.6)
MCHC RBC-ENTMCNC: 31.4 PG — SIGNIFICANT CHANGE UP (ref 27–34)
MCHC RBC-ENTMCNC: 32.4 GM/DL — SIGNIFICANT CHANGE UP (ref 32–36)
MCV RBC AUTO: 96.9 FL — SIGNIFICANT CHANGE UP (ref 80–100)
NRBC # BLD: 0 /100 WBCS — SIGNIFICANT CHANGE UP (ref 0–0)
NRBC # FLD: 0 K/UL — SIGNIFICANT CHANGE UP (ref 0–0)
PHOSPHATE SERPL-MCNC: 1.7 MG/DL — LOW (ref 2.5–4.5)
PLATELET # BLD AUTO: 307 K/UL — SIGNIFICANT CHANGE UP (ref 150–400)
POTASSIUM SERPL-MCNC: 4.4 MMOL/L — SIGNIFICANT CHANGE UP (ref 3.5–5.3)
POTASSIUM SERPL-SCNC: 4.4 MMOL/L — SIGNIFICANT CHANGE UP (ref 3.5–5.3)
RBC # BLD: 3.89 M/UL — LOW (ref 4.2–5.8)
RBC # FLD: 13.3 % — SIGNIFICANT CHANGE UP (ref 10.3–14.5)
SODIUM SERPL-SCNC: 135 MMOL/L — SIGNIFICANT CHANGE UP (ref 135–145)
WBC # BLD: 11.79 K/UL — HIGH (ref 3.8–10.5)
WBC # FLD AUTO: 11.79 K/UL — HIGH (ref 3.8–10.5)

## 2023-02-04 RX ORDER — DIAZEPAM 5 MG
5 TABLET ORAL THREE TIMES A DAY
Refills: 0 | Status: DISCONTINUED | OUTPATIENT
Start: 2023-02-04 | End: 2023-02-05

## 2023-02-04 RX ORDER — OXYBUTYNIN CHLORIDE 5 MG
5 TABLET ORAL
Refills: 0 | Status: DISCONTINUED | OUTPATIENT
Start: 2023-02-04 | End: 2023-02-04

## 2023-02-04 RX ORDER — OXYBUTYNIN CHLORIDE 5 MG
5 TABLET ORAL THREE TIMES A DAY
Refills: 0 | Status: DISCONTINUED | OUTPATIENT
Start: 2023-02-04 | End: 2023-02-08

## 2023-02-04 RX ORDER — LATANOPROST 0.05 MG/ML
1 SOLUTION/ DROPS OPHTHALMIC; TOPICAL AT BEDTIME
Refills: 0 | Status: DISCONTINUED | OUTPATIENT
Start: 2023-02-04 | End: 2023-02-09

## 2023-02-04 RX ORDER — ATROPA BELLADONNA AND OPIUM 16.2; 6 MG/1; MG/1
1 SUPPOSITORY RECTAL
Refills: 0 | Status: DISCONTINUED | OUTPATIENT
Start: 2023-02-04 | End: 2023-02-04

## 2023-02-04 RX ORDER — ACETAMINOPHEN 500 MG
1000 TABLET ORAL EVERY 6 HOURS
Refills: 0 | Status: COMPLETED | OUTPATIENT
Start: 2023-02-04 | End: 2023-02-05

## 2023-02-04 RX ADMIN — Medication 5 MILLIGRAM(S): at 13:24

## 2023-02-04 RX ADMIN — Medication 100 MILLIGRAM(S): at 17:46

## 2023-02-04 RX ADMIN — Medication 100 MILLIGRAM(S): at 03:15

## 2023-02-04 RX ADMIN — HYDROMORPHONE HYDROCHLORIDE 30 MILLILITER(S): 2 INJECTION INTRAMUSCULAR; INTRAVENOUS; SUBCUTANEOUS at 20:20

## 2023-02-04 RX ADMIN — ENOXAPARIN SODIUM 40 MILLIGRAM(S): 100 INJECTION SUBCUTANEOUS at 23:39

## 2023-02-04 RX ADMIN — PANTOPRAZOLE SODIUM 40 MILLIGRAM(S): 20 TABLET, DELAYED RELEASE ORAL at 10:36

## 2023-02-04 RX ADMIN — ENOXAPARIN SODIUM 40 MILLIGRAM(S): 100 INJECTION SUBCUTANEOUS at 00:21

## 2023-02-04 RX ADMIN — Medication 100 MILLIGRAM(S): at 10:35

## 2023-02-04 RX ADMIN — AMLODIPINE BESYLATE 2.5 MILLIGRAM(S): 2.5 TABLET ORAL at 05:13

## 2023-02-04 RX ADMIN — Medication 5 MILLIGRAM(S): at 21:42

## 2023-02-04 RX ADMIN — HYDROMORPHONE HYDROCHLORIDE 30 MILLILITER(S): 2 INJECTION INTRAMUSCULAR; INTRAVENOUS; SUBCUTANEOUS at 08:10

## 2023-02-04 RX ADMIN — FINASTERIDE 5 MILLIGRAM(S): 5 TABLET, FILM COATED ORAL at 10:35

## 2023-02-04 RX ADMIN — LATANOPROST 1 DROP(S): 0.05 SOLUTION/ DROPS OPHTHALMIC; TOPICAL at 21:42

## 2023-02-04 RX ADMIN — Medication 400 MILLIGRAM(S): at 23:39

## 2023-02-04 RX ADMIN — Medication 85 MILLIMOLE(S): at 15:44

## 2023-02-04 RX ADMIN — Medication 0.5 MILLIGRAM(S): at 07:01

## 2023-02-04 RX ADMIN — Medication 400 MILLIGRAM(S): at 17:46

## 2023-02-04 RX ADMIN — Medication 1000 MILLIGRAM(S): at 18:46

## 2023-02-04 NOTE — PROGRESS NOTE ADULT - SUBJECTIVE AND OBJECTIVE BOX
Subjective  Patient had complaints of bladder spasms and concerns that his catheter was not draining. He endorses the sensation of having to urinate and discomfort with the catheter.     Objective    Vital signs  T(F): , Max: 98 (02-04-23 @ 05:14)  HR: 69 (02-04-23 @ 17:46)  BP: 154/70 (02-04-23 @ 17:46)  SpO2: 97% (02-04-23 @ 17:46)  Wt(kg): --    Output     OUT:    Bulb (mL): 25.5 mL    Indwelling Catheter - Urethral (mL): 1600 mL  Total OUT: 1625.5 mL    Total NET: -1625.5 mL      OUT:    Bulb (mL): 12.5 mL    Indwelling Catheter - Urethral (mL): 425 mL  Total OUT: 437.5 mL    Total NET: -437.5 mL          Gen: NAD  Abd: soft, nontender, nondistended  : nix secured in place, draining CYU    Labs      02-04 @ 07:20    WBC 11.79 / Hct 37.7  / SCr 0.89     02-03 @ 07:32    WBC 10.81 / Hct 37.8  / SCr 0.96

## 2023-02-04 NOTE — PHYSICAL THERAPY INITIAL EVALUATION ADULT - ADDITIONAL COMMENTS
Pt reports he lives in a come with a staircase to his room, no history of falls.  Prior to admission pt was very active, often walking, swimming, etc.  Pt does not use DME.  Pt resides with his wife.

## 2023-02-04 NOTE — PROGRESS NOTE ADULT - ASSESSMENT
82M w h/o HTN, HLD, BPH, GERD, vertigo, glaucoma, with recent admission 1/18 for diverticulitis with large 6x6x6 diverticulum, managed non-op, now presenting with persistent weakness and decreased PO intake, concerning for refractory diverticulitis now s/p LAR with colorectal anastomosis, colovesicular fistula takedown, and pelvic abscess drainage, L ureterolysis & cystoscopy.    PLAN:  - Pain control: IV Tylenol & PCA pump  - Encourage IS  - Diet: NPO with sips and chips/IVF  - Monitor I+Os  - OOB/ Ambulate, PT consult  - DVT ppx: Lovenox  - cont IV cipro/flagyl  - nix to stay for 5 days    A Team Surgery  [id number]   82M w h/o HTN, HLD, BPH, GERD, vertigo, glaucoma, with recent admission 1/18 for diverticulitis with large 6x6x6 diverticulum, managed non-op, now presenting with persistent weakness and decreased PO intake, concerning for refractory diverticulitis now s/p LAR with colorectal anastomosis, colovesicular fistula takedown, and pelvic abscess drainage, L ureterolysis & cystoscopy.    PLAN:  - Pain control: IV Tylenol & PCA pump  - Encourage IS  - Diet: CLD  - Monitor I+Os  - OOB/ Ambulate, PT consult  - DVT ppx: Lovenox  - cont IV cipro/flagyl  - nix to stay for 5 days    A Team Surgery  [id number]

## 2023-02-04 NOTE — PROGRESS NOTE ADULT - SUBJECTIVE AND OBJECTIVE BOX
Anesthesia Pain Management Service    SUBJECTIVE: Patient is doing well with IV PCA.  Patient states the IV PCA helps with his pain.  The patient's dizziness from yesterday has resolved.  Today, he is complaining about his left arm being swollen after an IV infiltrated and that his nix needs constant manipulation when it is kinked or not draining.     Pain Scale Score	At rest: ___2-3/10 	With Activity: ___ 	[X ] Refer to charted pain scores    THERAPY:    [ ] IV PCA Morphine		[ ] 5 mg/mL	[ ] 1 mg/mL  [X ] IV PCA Hydromorphone	[ ] 5 mg/mL	[X ] 1 mg/mL  [ ] IV PCA Fentanyl		[ ] 50 micrograms/mL    Demand dose __0.15_ lockout __6_ (minutes) Continuous Rate _0__ Total: _9.3__  mg used (in past 24 hours)      MEDICATIONS  (STANDING):  acetaminophen   IVPB .. 1000 milliGRAM(s) IV Intermittent every 6 hours  amLODIPine   Tablet 2.5 milliGRAM(s) Oral daily  dextrose 5% + sodium chloride 0.9%. 1000 milliLiter(s) (100 mL/Hr) IV Continuous <Continuous>  enoxaparin Injectable 40 milliGRAM(s) SubCutaneous every 24 hours  finasteride 5 milliGRAM(s) Oral daily  HYDROmorphone PCA (1 mG/mL) 30 milliLiter(s) PCA Continuous PCA Continuous  levoFLOXacin IVPB      metoclopramide 10 milliGRAM(s) Oral once  metroNIDAZOLE  IVPB      metroNIDAZOLE  IVPB 500 milliGRAM(s) IV Intermittent every 8 hours  oxybutynin 5 milliGRAM(s) Oral two times a day  pantoprazole  Injectable 40 milliGRAM(s) IV Push daily  sodium phosphate 30 milliMole(s)/500 mL IVPB 30 milliMole(s) IV Intermittent once    MEDICATIONS  (PRN):  naloxone Injectable 0.1 milliGRAM(s) IV Push every 3 minutes PRN For ANY of the following changes in patient status:  A. RR LESS THAN 10 breaths per minute, B. Oxygen saturation LESS THAN 90%, C. Sedation score of 6  ondansetron Injectable 4 milliGRAM(s) IV Push every 6 hours PRN Nausea      OBJECTIVE:  Patient is sitting up in bed, accompanied by his wife.    Sedation Score:	[ X] Alert	 [ ] Drowsy 	[ ] Arousable	[ ] Asleep	[ ] Unresponsive    Side Effects:	[X ] None	[ ] Nausea	[ ] Vomiting	[ ] Pruritus  		[ ] Other:    Vital Signs Last 24 Hrs  T(C): 36.4 (04 Feb 2023 14:36), Max: 36.7 (04 Feb 2023 05:14)  T(F): 97.5 (04 Feb 2023 14:36), Max: 98 (04 Feb 2023 05:14)  HR: 70 (04 Feb 2023 14:36) (59 - 70)  BP: 150/68 (04 Feb 2023 14:36) (128/64 - 150/68)  BP(mean): --  RR: 18 (04 Feb 2023 14:36) (17 - 18)  SpO2: 94% (04 Feb 2023 14:36) (94% - 98%)    Parameters below as of 04 Feb 2023 10:01  Patient On (Oxygen Delivery Method): room air        ASSESSMENT/ PLAN    Therapy to  be:	[ X] Continue   [ ] Discontinued   [ ] Change to prn Analgesics    Documentation and Verification of current medications:   [X] Done	[ ] Not done, not elligible    Comments: Will continue with IV Dilaudid PCA. Encouraged incentive spirometer, elevation of left arm and informed patient that if he feels any lower abdominal pain or a fullness in his bladder to inform the RN right away. Patient verbally understands. RN is aware to check patient's nix more frequently.  Recommend non-opioid adjuvant analgesics to be used when possible and when allowed by primary surgical team.    Progress Note written now but Patient was seen earlier.

## 2023-02-04 NOTE — PROGRESS NOTE ADULT - SUBJECTIVE AND OBJECTIVE BOX
Colorectal Surgery Daily Progress Note    SUBJECTIVE: Pt seen and examined by team on morning rounds. No acute events overnight. Patient states he still experiencing some vertigo which began yesterday afternoon but that it has not worsened.  PCA pump dose decreased yesterday; pain well controlled. Denies SOB, chest pain, dizziness, palpitations, fever, chills, N/V. Patient is passing flatus.    Vital Signs Last 24 Hrs  T(C): 36.7 (04 Feb 2023 05:14), Max: 36.7 (03 Feb 2023 10:03)  T(F): 98 (04 Feb 2023 05:14), Max: 98.1 (03 Feb 2023 14:15)  HR: 63 (04 Feb 2023 05:14) (59 - 65)  BP: 128/64 (04 Feb 2023 05:14) (121/52 - 135/67)  BP(mean): --  RR: 17 (04 Feb 2023 05:14) (17 - 18)  SpO2: 98% (04 Feb 2023 05:14) (95% - 98%)    Parameters below as of 04 Feb 2023 05:14  Patient On (Oxygen Delivery Method): room air      IN:    dextrose 5% + sodium chloride 0.9%: 2400 mL  MEDICATIONS  (STANDING):  amLODIPine   Tablet 2.5 milliGRAM(s) Oral daily  dextrose 5% + sodium chloride 0.9%. 1000 milliLiter(s) (100 mL/Hr) IV Continuous <Continuous>  enoxaparin Injectable 40 milliGRAM(s) SubCutaneous every 24 hours  finasteride 5 milliGRAM(s) Oral daily  HYDROmorphone PCA (1 mG/mL) 30 milliLiter(s) PCA Continuous PCA Continuous  metoclopramide 10 milliGRAM(s) Oral once  metroNIDAZOLE  IVPB      metroNIDAZOLE  IVPB 500 milliGRAM(s) IV Intermittent every 8 hours  pantoprazole  Injectable 40 milliGRAM(s) IV Push daily  sodium phosphate 30 milliMole(s)/500 mL IVPB 30 milliMole(s) IV Intermittent once    MEDICATIONS  (PRN):  naloxone Injectable 0.1 milliGRAM(s) IV Push every 3 minutes PRN For ANY of the following changes in patient status:  A. RR LESS THAN 10 breaths per minute, B. Oxygen saturation LESS THAN 90%, C. Sedation score of 6  ondansetron Injectable 4 milliGRAM(s) IV Push every 6 hours PRN Nausea  oxybutynin 5 milliGRAM(s) Oral two times a day PRN Bladder spasms      IV PiggyBack: 100 mL  Total IN: 2500 mL    OUT:    Bulb (mL): 25.5 mL    Indwelling Catheter - Urethral (mL): 1600 mL    Oral Fluid: 0 mL  Total OUT: 1625.5 mL    Total NET: 874.5 mL    PE  General Appearance: Appears well, NAD  Neck: Supple  Chest: Equal expansion bilaterally, equal breath sounds  CV: Pulse regular presently  Abdomen: Soft, appropriate incisional tenderness, dressings clean and dry and intact  Extremities: Grossly symmetric, SCD's in place                             12.2   11.79 )-----------( 307      ( 04 Feb 2023 07:20 )             37.7     02-04    135  |  104  |  14  ----------------------------<  109<H>  4.4   |  25  |  0.89    Ca    8.7      04 Feb 2023 07:20  Phos  1.7     02-04  Mg     2.10     02-04

## 2023-02-04 NOTE — PROGRESS NOTE ADULT - ASSESSMENT
82M w h/o HTN, HLD, BPH, GERD, vertigo, glaucoma, with recent admission 1/18 for diverticulitis with large 6x6x6 diverticulum, managed non-op, now presenting with persistent weakness and decreased PO intake, concerning for refractory diverticulitis now s/p LAR with colorectal anastomosis, colovesicular fistula takedown, and pelvic abscess drainage. Urology was consulted intra-op and performed peritoneum closure over left ureter, cystoscopy and urethral catheter placement.    Evaluated the patient at bedside due to concerns with nix catheter drainage. Catheter balloon was taken down and advanced further into the bladder. It was then irrigated with NS and flushed easily. The balloon was re- inflated and the catheter was flushed again. Urine was a clear light peach. Cathter was draining to gravity.    Plan:  - from a urologic perspective, primary team can d/c nix when deemed appropriate  - oxybutynin TID for bladder spasms  - can add valium 5mg TID for bladder spasms as well.   - flush nix prn for concerns with drainage    [id number]

## 2023-02-05 LAB
ANION GAP SERPL CALC-SCNC: 5 MMOL/L — LOW (ref 7–14)
BUN SERPL-MCNC: 8 MG/DL — SIGNIFICANT CHANGE UP (ref 7–23)
CALCIUM SERPL-MCNC: 8.4 MG/DL — SIGNIFICANT CHANGE UP (ref 8.4–10.5)
CHLORIDE SERPL-SCNC: 102 MMOL/L — SIGNIFICANT CHANGE UP (ref 98–107)
CO2 SERPL-SCNC: 25 MMOL/L — SIGNIFICANT CHANGE UP (ref 22–31)
CREAT SERPL-MCNC: 0.79 MG/DL — SIGNIFICANT CHANGE UP (ref 0.5–1.3)
EGFR: 89 ML/MIN/1.73M2 — SIGNIFICANT CHANGE UP
GLUCOSE SERPL-MCNC: 114 MG/DL — HIGH (ref 70–99)
HCT VFR BLD CALC: 34.3 % — LOW (ref 39–50)
HGB BLD-MCNC: 11.5 G/DL — LOW (ref 13–17)
MAGNESIUM SERPL-MCNC: 1.9 MG/DL — SIGNIFICANT CHANGE UP (ref 1.6–2.6)
MCHC RBC-ENTMCNC: 31.9 PG — SIGNIFICANT CHANGE UP (ref 27–34)
MCHC RBC-ENTMCNC: 33.5 GM/DL — SIGNIFICANT CHANGE UP (ref 32–36)
MCV RBC AUTO: 95.3 FL — SIGNIFICANT CHANGE UP (ref 80–100)
NRBC # BLD: 0 /100 WBCS — SIGNIFICANT CHANGE UP (ref 0–0)
NRBC # FLD: 0 K/UL — SIGNIFICANT CHANGE UP (ref 0–0)
PHOSPHATE SERPL-MCNC: 1.6 MG/DL — LOW (ref 2.5–4.5)
PLATELET # BLD AUTO: 259 K/UL — SIGNIFICANT CHANGE UP (ref 150–400)
POTASSIUM SERPL-MCNC: 3.5 MMOL/L — SIGNIFICANT CHANGE UP (ref 3.5–5.3)
POTASSIUM SERPL-SCNC: 3.5 MMOL/L — SIGNIFICANT CHANGE UP (ref 3.5–5.3)
RBC # BLD: 3.6 M/UL — LOW (ref 4.2–5.8)
RBC # FLD: 13.2 % — SIGNIFICANT CHANGE UP (ref 10.3–14.5)
SODIUM SERPL-SCNC: 132 MMOL/L — LOW (ref 135–145)
WBC # BLD: 8.9 K/UL — SIGNIFICANT CHANGE UP (ref 3.8–10.5)
WBC # FLD AUTO: 8.9 K/UL — SIGNIFICANT CHANGE UP (ref 3.8–10.5)

## 2023-02-05 RX ORDER — ACETAMINOPHEN 500 MG
1000 TABLET ORAL EVERY 6 HOURS
Refills: 0 | Status: COMPLETED | OUTPATIENT
Start: 2023-02-05 | End: 2023-02-06

## 2023-02-05 RX ORDER — POTASSIUM PHOSPHATE, MONOBASIC POTASSIUM PHOSPHATE, DIBASIC 236; 224 MG/ML; MG/ML
30 INJECTION, SOLUTION INTRAVENOUS ONCE
Refills: 0 | Status: COMPLETED | OUTPATIENT
Start: 2023-02-05 | End: 2023-02-05

## 2023-02-05 RX ORDER — DIAZEPAM 5 MG
5 TABLET ORAL AT BEDTIME
Refills: 0 | Status: DISCONTINUED | OUTPATIENT
Start: 2023-02-05 | End: 2023-02-06

## 2023-02-05 RX ORDER — POLYETHYLENE GLYCOL 3350 17 G/17G
17 POWDER, FOR SOLUTION ORAL
Refills: 0 | Status: DISCONTINUED | OUTPATIENT
Start: 2023-02-05 | End: 2023-02-05

## 2023-02-05 RX ADMIN — Medication 1000 MILLIGRAM(S): at 23:49

## 2023-02-05 RX ADMIN — Medication 85 MILLIMOLE(S): at 11:55

## 2023-02-05 RX ADMIN — Medication 1000 MILLIGRAM(S): at 00:09

## 2023-02-05 RX ADMIN — LATANOPROST 1 DROP(S): 0.05 SOLUTION/ DROPS OPHTHALMIC; TOPICAL at 21:46

## 2023-02-05 RX ADMIN — Medication 5 MILLIGRAM(S): at 05:34

## 2023-02-05 RX ADMIN — Medication 400 MILLIGRAM(S): at 17:43

## 2023-02-05 RX ADMIN — Medication 100 MILLIGRAM(S): at 21:44

## 2023-02-05 RX ADMIN — Medication 1000 MILLIGRAM(S): at 12:00

## 2023-02-05 RX ADMIN — AMLODIPINE BESYLATE 2.5 MILLIGRAM(S): 2.5 TABLET ORAL at 05:34

## 2023-02-05 RX ADMIN — PANTOPRAZOLE SODIUM 40 MILLIGRAM(S): 20 TABLET, DELAYED RELEASE ORAL at 11:59

## 2023-02-05 RX ADMIN — POTASSIUM PHOSPHATE, MONOBASIC POTASSIUM PHOSPHATE, DIBASIC 83.33 MILLIMOLE(S): 236; 224 INJECTION, SOLUTION INTRAVENOUS at 17:43

## 2023-02-05 RX ADMIN — Medication 5 MILLIGRAM(S): at 21:45

## 2023-02-05 RX ADMIN — Medication 400 MILLIGRAM(S): at 05:33

## 2023-02-05 RX ADMIN — Medication 100 MILLIGRAM(S): at 10:41

## 2023-02-05 RX ADMIN — HYDROMORPHONE HYDROCHLORIDE 30 MILLILITER(S): 2 INJECTION INTRAMUSCULAR; INTRAVENOUS; SUBCUTANEOUS at 21:43

## 2023-02-05 RX ADMIN — Medication 5 MILLIGRAM(S): at 17:42

## 2023-02-05 RX ADMIN — FINASTERIDE 5 MILLIGRAM(S): 5 TABLET, FILM COATED ORAL at 11:55

## 2023-02-05 RX ADMIN — Medication 1000 MILLIGRAM(S): at 06:03

## 2023-02-05 RX ADMIN — ENOXAPARIN SODIUM 40 MILLIGRAM(S): 100 INJECTION SUBCUTANEOUS at 23:20

## 2023-02-05 RX ADMIN — Medication 400 MILLIGRAM(S): at 23:19

## 2023-02-05 RX ADMIN — Medication 100 MILLIGRAM(S): at 02:05

## 2023-02-05 RX ADMIN — HYDROMORPHONE HYDROCHLORIDE 30 MILLILITER(S): 2 INJECTION INTRAMUSCULAR; INTRAVENOUS; SUBCUTANEOUS at 20:02

## 2023-02-05 RX ADMIN — HYDROMORPHONE HYDROCHLORIDE 30 MILLILITER(S): 2 INJECTION INTRAMUSCULAR; INTRAVENOUS; SUBCUTANEOUS at 08:09

## 2023-02-05 RX ADMIN — Medication 1000 MILLIGRAM(S): at 18:14

## 2023-02-05 RX ADMIN — Medication 400 MILLIGRAM(S): at 11:55

## 2023-02-05 NOTE — PROGRESS NOTE ADULT - SUBJECTIVE AND OBJECTIVE BOX
Anesthesia Pain Management Service    SUBJECTIVE: Patient is doing well with IV PCA and dizziness has resolved. Patient states he was not able to ambulate much yesterday and was unable to get up this morning but will attempt again later today. Tolerating CLD so far.    Pain Scale Score	At rest: ___ 	With Activity: ___ 	[X ] Refer to charted pain scores    THERAPY:    [ ] IV PCA Morphine		[ ] 5 mg/mL	[ ] 1 mg/mL  [X ] IV PCA Hydromorphone	[ ] 5 mg/mL	[X ] 1 mg/mL  [ ] IV PCA Fentanyl		[ ] 50 micrograms/mL    Demand dose __0.15_ lockout __6_ (minutes) Continuous Rate _0__ Total: _5.85__  mg used (in past 24 hours)      MEDICATIONS  (STANDING):  acetaminophen   IVPB .. 1000 milliGRAM(s) IV Intermittent every 6 hours  amLODIPine   Tablet 2.5 milliGRAM(s) Oral daily  dextrose 5% + sodium chloride 0.9%. 1000 milliLiter(s) (75 mL/Hr) IV Continuous <Continuous>  diazepam    Tablet 5 milliGRAM(s) Oral at bedtime  enoxaparin Injectable 40 milliGRAM(s) SubCutaneous every 24 hours  finasteride 5 milliGRAM(s) Oral daily  HYDROmorphone PCA (1 mG/mL) 30 milliLiter(s) PCA Continuous PCA Continuous  latanoprost 0.005% Ophthalmic Solution 1 Drop(s) Both EYES at bedtime  levoFLOXacin IVPB      levoFLOXacin IVPB 500 milliGRAM(s) IV Intermittent every 24 hours  metoclopramide 10 milliGRAM(s) Oral once  metroNIDAZOLE  IVPB      metroNIDAZOLE  IVPB 500 milliGRAM(s) IV Intermittent every 8 hours  oxybutynin 5 milliGRAM(s) Oral three times a day  pantoprazole  Injectable 40 milliGRAM(s) IV Push daily  potassium phosphate IVPB 30 milliMole(s) IV Intermittent once  sodium phosphate 30 milliMole(s)/500 mL IVPB 30 milliMole(s) IV Intermittent once    MEDICATIONS  (PRN):  naloxone Injectable 0.1 milliGRAM(s) IV Push every 3 minutes PRN For ANY of the following changes in patient status:  A. RR LESS THAN 10 breaths per minute, B. Oxygen saturation LESS THAN 90%, C. Sedation score of 6  ondansetron Injectable 4 milliGRAM(s) IV Push every 6 hours PRN Nausea      OBJECTIVE: Patient sitting up in bed.    Sedation Score:	[ X] Alert	[ ] Drowsy 	[ ] Arousable	[ ] Asleep	[ ] Unresponsive    Side Effects:	[X ] None	[ ] Nausea	[ ] Vomiting	[ ] Pruritus  		[ ] Other:    Vital Signs Last 24 Hrs  T(C): 36.6 (05 Feb 2023 10:07), Max: 37.1 (05 Feb 2023 02:08)  T(F): 97.9 (05 Feb 2023 10:07), Max: 98.8 (05 Feb 2023 02:08)  HR: 65 (05 Feb 2023 10:07) (63 - 70)  BP: 142/59 (05 Feb 2023 10:07) (139/56 - 154/70)  BP(mean): --  RR: 17 (05 Feb 2023 10:07) (17 - 18)  SpO2: 95% (05 Feb 2023 10:07) (94% - 99%)    Parameters below as of 05 Feb 2023 10:07  Patient On (Oxygen Delivery Method): room air        ASSESSMENT/ PLAN    Therapy to  be:	[ X] Continue   [ ] Discontinued   [ ] Change to prn Analgesics    Documentation and Verification of current medications:   [X] Done	[ ] Not done, not elligible    Comments: Discussed patient with primary team, continued IV PCA. Recommend non-opioid adjuvant analgesics to be used when possible and when allowed by primary surgical team.    Progress Note written now but Patient was seen earlier.

## 2023-02-05 NOTE — PROGRESS NOTE ADULT - ASSESSMENT
82M w h/o HTN, HLD, BPH, GERD, vertigo, glaucoma, with recent admission 1/18 for diverticulitis with large 6x6x6 diverticulum, managed non-op, now presenting with persistent weakness and decreased PO intake, concerning for refractory diverticulitis now s/p LAR with colorectal anastomosis, colovesicular fistula takedown, and pelvic abscess drainage, L ureterolysis & cystoscopy on 2/2    PLAN:  - Pain control: IV Tylenol & PCA pump  - Encourage IS  - Diet: CLD  - Monitor I+Os  - OOB/ Ambulate, PT consult  - DVT ppx: Lovenox  - valium lessened to QD at bedtime.  - cont IV cipro/flagyl  - nix to stay for 5 days    A Team Surgery  [id number]   82M w h/o HTN, HLD, BPH, GERD, vertigo, glaucoma, with recent admission 1/18 for diverticulitis with large 6x6x6 diverticulum, managed non-op, now presenting with persistent weakness and decreased PO intake, concerning for refractory diverticulitis now s/p LAR with colorectal anastomosis, colovesicular fistula takedown, and pelvic abscess drainage, L ureterolysis & cystoscopy on 2/2    PLAN:  - Pain control: IV Tylenol & PCA pump  - Encourage IS  - Diet: CLD  - Monitor I+Os  - OOB/ Ambulate, PT consult  - DVT ppx: Lovenox  - valium lessened to QD at bedtime.  - cont IV cipro/flagyl  - nix to stay for 5 days    A Team Surgery  [id number]      Pt seen and examined  agree with above     possible dc cath in am     Robert Jean-Baptiste MD

## 2023-02-05 NOTE — PROGRESS NOTE ADULT - SUBJECTIVE AND OBJECTIVE BOX
Surgery Progress Note     Subjective:  Patient seen and examined. Patient was somnolent but arousable. Patient denies nausea, vomiting, abdominal pain.       Vital Signs:  Vital Signs Last 24 Hrs  T(C): 36.5 (05 Feb 2023 05:30), Max: 37.1 (05 Feb 2023 02:08)  T(F): 97.7 (05 Feb 2023 05:30), Max: 98.8 (05 Feb 2023 02:08)  HR: 66 (05 Feb 2023 05:30) (63 - 70)  BP: 151/62 (05 Feb 2023 05:30) (139/56 - 154/70)  RR: 18 (05 Feb 2023 05:30) (17 - 18)  SpO2: 99% (05 Feb 2023 05:30) (94% - 99%)    Parameters below as of 05 Feb 2023 05:30  Patient On (Oxygen Delivery Method): room air        CAPILLARY BLOOD GLUCOSE          I&O's Detail    04 Feb 2023 07:01  -  05 Feb 2023 07:00  --------------------------------------------------------  IN:    dextrose 5% + sodium chloride 0.9%: 1875 mL    IV PiggyBack: 700 mL    Oral Fluid: 490 mL  Total IN: 3065 mL    OUT:    Bulb (mL): 57.5 mL    Indwelling Catheter - Urethral (mL): 3825 mL  Total OUT: 3882.5 mL    Total NET: -817.5 mL      05 Feb 2023 07:01  -  05 Feb 2023 09:33  --------------------------------------------------------  IN:  Total IN: 0 mL    OUT:    Indwelling Catheter - Urethral (mL): 600 mL  Total OUT: 600 mL    Total NET: -600 mL            Physical Exam:  General: NAD, somnolent  Respiratory: Nonlabored respirations  Cardio: pulse present  Abdomen: soft, nondistended, nontender, surgical incisions are c/d/i  : boyd slightly pink   Vascular: extremities are warm and well perfused.       Labs:    02-05    132<L>  |  102  |  8   ----------------------------<  114<H>  3.5   |  25  |  0.79    Ca    8.4      05 Feb 2023 05:57  Phos  1.6     02-05  Mg     1.90     02-05                              11.5   8.90  )-----------( 259      ( 05 Feb 2023 05:57 )             34.3

## 2023-02-06 ENCOUNTER — TRANSCRIPTION ENCOUNTER (OUTPATIENT)
Age: 83
End: 2023-02-06

## 2023-02-06 LAB
ANION GAP SERPL CALC-SCNC: 9 MMOL/L — SIGNIFICANT CHANGE UP (ref 7–14)
BUN SERPL-MCNC: 5 MG/DL — LOW (ref 7–23)
CALCIUM SERPL-MCNC: 8.5 MG/DL — SIGNIFICANT CHANGE UP (ref 8.4–10.5)
CHLORIDE SERPL-SCNC: 103 MMOL/L — SIGNIFICANT CHANGE UP (ref 98–107)
CO2 SERPL-SCNC: 25 MMOL/L — SIGNIFICANT CHANGE UP (ref 22–31)
CREAT SERPL-MCNC: 0.74 MG/DL — SIGNIFICANT CHANGE UP (ref 0.5–1.3)
EGFR: 90 ML/MIN/1.73M2 — SIGNIFICANT CHANGE UP
GLUCOSE SERPL-MCNC: 97 MG/DL — SIGNIFICANT CHANGE UP (ref 70–99)
HCT VFR BLD CALC: 36.4 % — LOW (ref 39–50)
HGB BLD-MCNC: 12.1 G/DL — LOW (ref 13–17)
MAGNESIUM SERPL-MCNC: 1.8 MG/DL — SIGNIFICANT CHANGE UP (ref 1.6–2.6)
MCHC RBC-ENTMCNC: 31.3 PG — SIGNIFICANT CHANGE UP (ref 27–34)
MCHC RBC-ENTMCNC: 33.2 GM/DL — SIGNIFICANT CHANGE UP (ref 32–36)
MCV RBC AUTO: 94.3 FL — SIGNIFICANT CHANGE UP (ref 80–100)
NRBC # BLD: 0 /100 WBCS — SIGNIFICANT CHANGE UP (ref 0–0)
NRBC # FLD: 0 K/UL — SIGNIFICANT CHANGE UP (ref 0–0)
PHOSPHATE SERPL-MCNC: 2.1 MG/DL — LOW (ref 2.5–4.5)
PLATELET # BLD AUTO: 271 K/UL — SIGNIFICANT CHANGE UP (ref 150–400)
POTASSIUM SERPL-MCNC: 3.6 MMOL/L — SIGNIFICANT CHANGE UP (ref 3.5–5.3)
POTASSIUM SERPL-SCNC: 3.6 MMOL/L — SIGNIFICANT CHANGE UP (ref 3.5–5.3)
RBC # BLD: 3.86 M/UL — LOW (ref 4.2–5.8)
RBC # FLD: 13.2 % — SIGNIFICANT CHANGE UP (ref 10.3–14.5)
SODIUM SERPL-SCNC: 137 MMOL/L — SIGNIFICANT CHANGE UP (ref 135–145)
WBC # BLD: 7.04 K/UL — SIGNIFICANT CHANGE UP (ref 3.8–10.5)
WBC # FLD AUTO: 7.04 K/UL — SIGNIFICANT CHANGE UP (ref 3.8–10.5)

## 2023-02-06 RX ORDER — POLYETHYLENE GLYCOL 3350 17 G/17G
17 POWDER, FOR SOLUTION ORAL DAILY
Refills: 0 | Status: DISCONTINUED | OUTPATIENT
Start: 2023-02-06 | End: 2023-02-06

## 2023-02-06 RX ORDER — POTASSIUM CHLORIDE 20 MEQ
10 PACKET (EA) ORAL
Refills: 0 | Status: DISCONTINUED | OUTPATIENT
Start: 2023-02-06 | End: 2023-02-06

## 2023-02-06 RX ORDER — DIAZEPAM 5 MG
5 TABLET ORAL ONCE
Refills: 0 | Status: DISCONTINUED | OUTPATIENT
Start: 2023-02-06 | End: 2023-02-06

## 2023-02-06 RX ORDER — TAMSULOSIN HYDROCHLORIDE 0.4 MG/1
0.8 CAPSULE ORAL ONCE
Refills: 0 | Status: COMPLETED | OUTPATIENT
Start: 2023-02-06 | End: 2023-02-06

## 2023-02-06 RX ORDER — POTASSIUM PHOSPHATE, MONOBASIC POTASSIUM PHOSPHATE, DIBASIC 236; 224 MG/ML; MG/ML
30 INJECTION, SOLUTION INTRAVENOUS ONCE
Refills: 0 | Status: DISCONTINUED | OUTPATIENT
Start: 2023-02-06 | End: 2023-02-06

## 2023-02-06 RX ORDER — HYDROMORPHONE HYDROCHLORIDE 2 MG/ML
1 INJECTION INTRAMUSCULAR; INTRAVENOUS; SUBCUTANEOUS EVERY 6 HOURS
Refills: 0 | Status: DISCONTINUED | OUTPATIENT
Start: 2023-02-06 | End: 2023-02-06

## 2023-02-06 RX ORDER — SENNA PLUS 8.6 MG/1
2 TABLET ORAL AT BEDTIME
Refills: 0 | Status: DISCONTINUED | OUTPATIENT
Start: 2023-02-06 | End: 2023-02-06

## 2023-02-06 RX ORDER — ACETAMINOPHEN 500 MG
1000 TABLET ORAL ONCE
Refills: 0 | Status: COMPLETED | OUTPATIENT
Start: 2023-02-06 | End: 2023-02-07

## 2023-02-06 RX ORDER — TAMSULOSIN HYDROCHLORIDE 0.4 MG/1
0.4 CAPSULE ORAL AT BEDTIME
Refills: 0 | Status: DISCONTINUED | OUTPATIENT
Start: 2023-02-07 | End: 2023-02-09

## 2023-02-06 RX ORDER — OXYCODONE HYDROCHLORIDE 5 MG/1
5 TABLET ORAL EVERY 4 HOURS
Refills: 0 | Status: DISCONTINUED | OUTPATIENT
Start: 2023-02-06 | End: 2023-02-09

## 2023-02-06 RX ORDER — POTASSIUM CHLORIDE 20 MEQ
10 PACKET (EA) ORAL
Refills: 0 | Status: COMPLETED | OUTPATIENT
Start: 2023-02-06 | End: 2023-02-06

## 2023-02-06 RX ORDER — MAGNESIUM SULFATE 500 MG/ML
2 VIAL (ML) INJECTION ONCE
Refills: 0 | Status: COMPLETED | OUTPATIENT
Start: 2023-02-06 | End: 2023-02-06

## 2023-02-06 RX ADMIN — Medication 5 MILLIGRAM(S): at 22:31

## 2023-02-06 RX ADMIN — Medication 1000 MILLIGRAM(S): at 12:00

## 2023-02-06 RX ADMIN — AMLODIPINE BESYLATE 2.5 MILLIGRAM(S): 2.5 TABLET ORAL at 05:42

## 2023-02-06 RX ADMIN — Medication 400 MILLIGRAM(S): at 11:40

## 2023-02-06 RX ADMIN — Medication 100 MILLIGRAM(S): at 15:09

## 2023-02-06 RX ADMIN — Medication 100 MILLIEQUIVALENT(S): at 10:01

## 2023-02-06 RX ADMIN — Medication 5 MILLIGRAM(S): at 19:41

## 2023-02-06 RX ADMIN — Medication 85 MILLIMOLE(S): at 11:40

## 2023-02-06 RX ADMIN — Medication 5 MILLIGRAM(S): at 15:10

## 2023-02-06 RX ADMIN — Medication 100 MILLIEQUIVALENT(S): at 13:36

## 2023-02-06 RX ADMIN — Medication 25 GRAM(S): at 10:01

## 2023-02-06 RX ADMIN — Medication 100 MILLIEQUIVALENT(S): at 15:09

## 2023-02-06 RX ADMIN — ENOXAPARIN SODIUM 40 MILLIGRAM(S): 100 INJECTION SUBCUTANEOUS at 23:44

## 2023-02-06 RX ADMIN — Medication 100 MILLIGRAM(S): at 22:33

## 2023-02-06 RX ADMIN — HYDROMORPHONE HYDROCHLORIDE 30 MILLILITER(S): 2 INJECTION INTRAMUSCULAR; INTRAVENOUS; SUBCUTANEOUS at 08:24

## 2023-02-06 RX ADMIN — OXYCODONE HYDROCHLORIDE 5 MILLIGRAM(S): 5 TABLET ORAL at 13:39

## 2023-02-06 RX ADMIN — OXYCODONE HYDROCHLORIDE 5 MILLIGRAM(S): 5 TABLET ORAL at 14:30

## 2023-02-06 RX ADMIN — Medication 400 MILLIGRAM(S): at 05:43

## 2023-02-06 RX ADMIN — LATANOPROST 1 DROP(S): 0.05 SOLUTION/ DROPS OPHTHALMIC; TOPICAL at 22:32

## 2023-02-06 RX ADMIN — PANTOPRAZOLE SODIUM 40 MILLIGRAM(S): 20 TABLET, DELAYED RELEASE ORAL at 11:40

## 2023-02-06 RX ADMIN — Medication 100 MILLIGRAM(S): at 05:38

## 2023-02-06 RX ADMIN — Medication 5 MILLIGRAM(S): at 05:43

## 2023-02-06 RX ADMIN — FINASTERIDE 5 MILLIGRAM(S): 5 TABLET, FILM COATED ORAL at 11:40

## 2023-02-06 RX ADMIN — TAMSULOSIN HYDROCHLORIDE 0.8 MILLIGRAM(S): 0.4 CAPSULE ORAL at 17:55

## 2023-02-06 NOTE — DISCHARGE NOTE PROVIDER - NSDCHHHOMEBOUNDOTHER_GEN_ALL_CORE_FT
Home PT with Rolling Walker. Patient s/p Low Anterior Resection with Colorectal Anastamosis, Colovesicular Takedown, Pelvic Abscess Drainage, Left Ureterolysis & Cystostomy. 2/2/23 Home PT with Rolling Walker. Patient s/p Low Anterior Resection with Colorectal Anastamosis, Colovesicular Takedown, Pelvic Abscess Drainage, Left Ureterolysis & Cystostomy. 2/2/23  Liz Catheter: Please assist in care, emptying, and output documentation.

## 2023-02-06 NOTE — DISCHARGE NOTE PROVIDER - NSDCHHNEEDSERVICEOTHER_GEN_ALL_CORE_FT
Home PT with Rolling Walker. Patient s/p Low Anterior Resection with Colorectal Anastamosis, Colovesicular Takedown, Pelvic Abscess Drainage, Left Ureterolysis & Cystostomy. 2/2/23  Liz Catheter: Please assist in care, emptying, and output documentation.  Home PT with Rolling Walker. Patient s/p Low Anterior Resection with Colorectal Anastamosis, Colovesicular Takedown, Pelvic Abscess Drainage, Left Ureterolysis & Cystostomy. 2/2/23  16 [place] Liz Catheter: Please assist in care, emptying, and output documentation.

## 2023-02-06 NOTE — PROGRESS NOTE ADULT - SUBJECTIVE AND OBJECTIVE BOX
Anesthesia Pain Management Service    SUBJECTIVE: Patient is doing well with IV PCA and no significant problems reported. Denies n/v. No BM. Tolerating clears    Pain Scale Score	At rest: ___ 	With Activity: ___ 	[X] Refer to charted pain scores    THERAPY:    [ ] IV PCA Morphine		[ ] 5 mg/mL	[ ] 1 mg/mL  [x] IV PCA Hydromorphone	[ ] 5 mg/mL	[X] 1 mg/mL  [ ] IV PCA Fentanyl		[ ] 50 micrograms/mL    Demand dose _0.2_ lockout _6_ (minutes) Continuous Rate _0_ Total: 1.95 mg used (in past 24 hrs)      MEDICATIONS  (STANDING):  acetaminophen   IVPB .. 1000 milliGRAM(s) IV Intermittent every 6 hours  amLODIPine   Tablet 2.5 milliGRAM(s) Oral daily  dextrose 5% + sodium chloride 0.9%. 1000 milliLiter(s) (75 mL/Hr) IV Continuous <Continuous>  diazepam    Tablet 5 milliGRAM(s) Oral at bedtime  enoxaparin Injectable 40 milliGRAM(s) SubCutaneous every 24 hours  finasteride 5 milliGRAM(s) Oral daily  HYDROmorphone PCA (1 mG/mL) 30 milliLiter(s) PCA Continuous PCA Continuous  latanoprost 0.005% Ophthalmic Solution 1 Drop(s) Both EYES at bedtime  levoFLOXacin IVPB      levoFLOXacin IVPB 500 milliGRAM(s) IV Intermittent every 24 hours  magnesium sulfate  IVPB 2 Gram(s) IV Intermittent once  metoclopramide 10 milliGRAM(s) Oral once  metroNIDAZOLE  IVPB 500 milliGRAM(s) IV Intermittent every 8 hours  metroNIDAZOLE  IVPB      oxybutynin 5 milliGRAM(s) Oral three times a day  pantoprazole  Injectable 40 milliGRAM(s) IV Push daily  polyethylene glycol 3350 17 Gram(s) Oral daily  potassium chloride  10 mEq/100 mL IVPB 10 milliEquivalent(s) IV Intermittent every 1 hour  senna 2 Tablet(s) Oral at bedtime  sodium phosphate 30 milliMole(s)/500 mL IVPB 30 milliMole(s) IV Intermittent once    MEDICATIONS  (PRN):  naloxone Injectable 0.1 milliGRAM(s) IV Push every 3 minutes PRN For ANY of the following changes in patient status:  A. RR LESS THAN 10 breaths per minute, B. Oxygen saturation LESS THAN 90%, C. Sedation score of 6  ondansetron Injectable 4 milliGRAM(s) IV Push every 6 hours PRN Nausea      OBJECTIVE:  Lying in bed.    Sedation Score:	[X] Alert	  [ ] Drowsy 	[ ] Arousable	[ ] Asleep	[ ] Unresponsive    Side Effects:	[X] None 	[ ] Nausea	[ ] Vomiting	[ ] Pruritus  		[ ] Other:    Vital Signs Last 24 Hrs  T(C): 36.9 (06 Feb 2023 09:26), Max: 37 (05 Feb 2023 17:49)  T(F): 98.4 (06 Feb 2023 09:26), Max: 98.6 (05 Feb 2023 17:49)  HR: 66 (06 Feb 2023 09:26) (63 - 68)  BP: 132/67 (06 Feb 2023 09:26) (132/67 - 160/74)  BP(mean): --  RR: 20 (06 Feb 2023 09:26) (17 - 20)  SpO2: 96% (06 Feb 2023 09:26) (95% - 98%)    Parameters below as of 06 Feb 2023 09:26  Patient On (Oxygen Delivery Method): room air        ASSESSMENT/ PLAN    Therapy to be:	[ ] Continued   [X] Discontinued   [X] Changed to prn Analgesics    Documentation and Verification of current medications:   [X] Done	[ ] Not done, not eligible    Comments: PRN Oral/IV opioids and/or Adjuvant non-opioid medication to be ordered at this point.    Progress Note written now but Patient was seen earlier.

## 2023-02-06 NOTE — DISCHARGE NOTE PROVIDER - NSDCHHCONTACT_GEN_ALL_CORE_FT
[facility] Brief Operative Note    Pre-operative diagnosis: unknown   Post-operative diagnosis Cholelithiasis     Procedure: Procedure(s):  LAPAROSCOPIC CHOLECYSTECTOMY - Wound Class: II-Clean Contaminated   Surgeon(s): Surgeon(s) and Role:     * Ashwin Kemp MD - Primary     * Jane Serna PA-C - Assisting   Estimated blood loss: 25 mL    Specimens:   ID Type Source Tests Collected by Time Destination   A : Gallbladder and contents Tissue Gallbladder and Contents SURGICAL PATHOLOGY EXAM Ashwin Kemp MD 2/10/2017  1:16 PM       Findings: Fibrosis and resolving inflammation around gallbladder.       As certified below, I, or a nurse practitioner or physician assistant working with me, had a face-to-face encounter that meets the physician face-to-face encounter requirements.

## 2023-02-06 NOTE — DISCHARGE NOTE PROVIDER - NSDCCPTREATMENT_GEN_ALL_CORE_FT
PRINCIPAL PROCEDURE  Procedure: Laparoscopic low anterior resection  Findings and Treatment: Colorectal Anastamosis, Colovesical Fistula Takedown, Pelvic Abscess Drainage, Left Ureterolysis, Cystoscopy      SECONDARY PROCEDURE  Procedure: Repair of colovesical fistula  Findings and Treatment:

## 2023-02-06 NOTE — DISCHARGE NOTE PROVIDER - NSRESEARCHGRANT_OVERRIDEREC_GEN_A_CORE
This is a surgical and/or non-medical patient. This is a surgical and/or non-medical patient./IMPROVE-DD Application Not Available

## 2023-02-06 NOTE — DISCHARGE NOTE PROVIDER - CARE PROVIDER_API CALL
Rboert Parada)  ColonRectal Surgery; Surgery  [address] [facility] [place], [address]  [place] [address]  Phone: [phone]  Fax: [phone]  Follow Up Time: 1 week   Robert Parada)  ColonRectal Surgery; Surgery  [address] [facility], [address]  [place] [address]  Phone: [phone]  Fax: [phone]  Follow Up Time: 1 week    Emanuel Dozier  UROLOGY  [address] [place] [address], [address] [place] [address]  Phone: [phone]  Fax: [phone]  Follow Up Time: 1-3 days   Robert Parada)  ColonRectal Surgery; Surgery  [address] [facility], [address]  [place] [address]  Phone: [phone]  Fax: [phone]  Follow Up Time: 2 weeks    Emanuel Dozier  UROLOGY  [address] [place] [address], [address] [place] [address]  Phone: [phone]  Fax: [phone]  Follow Up Time: 1 week

## 2023-02-06 NOTE — DISCHARGE NOTE PROVIDER - PROVIDER TOKENS
PROVIDER:[TOKEN:[[id number]],FOLLOWUP:[1 week]] PROVIDER:[TOKEN:[[id number]],FOLLOWUP:[1 week]],PROVIDER:[TOKEN:[[id number]],FOLLOWUP:[1-3 days]] PROVIDER:[TOKEN:[[id number]],FOLLOWUP:[2 weeks]],PROVIDER:[TOKEN:[[id number]],FOLLOWUP:[1 week]]

## 2023-02-06 NOTE — DISCHARGE NOTE PROVIDER - NSDCFUADDINST_GEN_ALL_CORE_FT
WOUND CARE:  Please keep incisions clean and dry. Please do not Scrub or rub incisions. Do not use lotion or powder on incisions  BATHING: Please do not submerge wound underwater. You may shower and/or sponge bathe 48 hours after surgery.  ACTIVITY: No heavy lifting or straining. Otherwise, you may return to your usual level of physical activity. If you are taking narcotic pain medication (such as Oxycodone) DO NOT drive a car, operate machinery or make important decisions.  DIET: Return to your usual diet.  NOTIFY YOUR SURGEON IF: You have any bleeding that does not stop, any pus draining from your wound(s), any fever (over 100.4 F) or chills, persistent nausea/vomiting, persistent diarrhea, or if your pain is not controlled on your discharge pain medications.  FOLLOW-UP: Please follow up with your primary care physician in one week regarding your hospitalization.  Please follow up with your surgeon.  Call today for appointment in 1 week.  Please follow up with your Urologist. Call today for appointment in 1 week.   NIX CATHETER: You are being discharged with a nix catheter. You have been taught how to care for/empty/document output. A visiting nurse has been set up to help you care for your nix however you have shown competence in the above. Please bring the documentation of daily outputs to your Surgeon and Urologist's Office at your follow up visit. If you have significantly decreased output from your nix, or any change in the color or consistency of the output, please call your Urologist's office. If you have any concerns or questions about your nix, please call your Urologist's Office.   WOUND CARE:  Please keep incisions clean and dry. Please do not Scrub or rub incisions. Do not use lotion or powder on incisions  BATHING: Please do not submerge wound underwater. You may shower and/or sponge bathe 48 hours after surgery.  ACTIVITY: No heavy lifting or straining. Otherwise, you may return to your usual level of physical activity. If you are taking narcotic pain medication (such as Oxycodone) DO NOT drive a car, operate machinery or make important decisions.  DIET: Return to your usual diet.  NOTIFY YOUR SURGEON IF: You have any bleeding that does not stop, any pus draining from your wound(s), any fever (over 100.4 F) or chills, persistent nausea/vomiting, persistent diarrhea, or if your pain is not controlled on your discharge pain medications.  FOLLOW-UP: Please follow up with your primary care physician in one week regarding your hospitalization.  Please follow up with your surgeon (Dr. Robert Parada). Please call today for appointment on 2/28/23.  Please follow up with your Urologist. Call today for appointment in 1 week.

## 2023-02-06 NOTE — DISCHARGE NOTE PROVIDER - CARE PROVIDERS DIRECT ADDRESSES
,DirectAddress_Unknown ,DirectAddress_Unknown,[email or web address]

## 2023-02-06 NOTE — PROGRESS NOTE ADULT - ASSESSMENT
82M w h/o HTN, HLD, BPH, GERD, vertigo, glaucoma, with recent admission 1/18 for diverticulitis with large 6x6x6 diverticulum, managed non-op, now presenting with persistent weakness and decreased PO intake, concerning for refractory diverticulitis now s/p LAR with colorectal anastomosis, colovesicular fistula takedown, and pelvic abscess drainage, L ureterolysis & cystoscopy on 2/2    PLAN:  - Pain control: IV Tylenol & PCA pump  - Encourage IS  - Diet: CLD  - Monitor I+Os  - OOB/ Ambulate, PT consult  - DVT ppx: Lovenox  - valium lessened to QD at bedtime.  - cont IV cipro/flagyl  - nix to stay for 5 days    A Team Surgery  [id number]      Pt seen and examined  agree with above     possible dc cath in am     Robert Jean-Baptiste MD   82M w h/o HTN, HLD, BPH, GERD, vertigo, glaucoma, with recent admission 1/18 for diverticulitis with large 6x6x6 diverticulum, managed non-op, now presenting with persistent weakness and decreased PO intake, concerning for refractory diverticulitis now s/p LAR with colorectal anastomosis, colovesicular fistula takedown, and pelvic abscess drainage, L ureterolysis & cystoscopy on 2/2    PLAN:  - Pain control: IV Tylenol, d/c PCA  - Encourage IS  - Diet: CLD  - Monitor I+Os  - OOB/ Ambulate, PT consult  - DVT ppx: Lovenox  - valium lessened to QD at bedtime.  - cont IV cipro/flagyl  - nix to stay for 5 days    A Team Surgery  [id number]      Pt seen and examined  agree with above     possible dc cath in am     Robert Jean-Baptiste MD   82M w h/o HTN, HLD, BPH, GERD, vertigo, glaucoma, with recent admission 1/18 for diverticulitis with large 6x6x6 diverticulum, managed non-op, now presenting with persistent weakness and decreased PO intake, concerning for refractory diverticulitis now s/p LAR with colorectal anastomosis, colovesicular fistula takedown, and pelvic abscess drainage, L ureterolysis & cystoscopy on 2/2    PLAN:  - Pain control: IV Tylenol, d/c PCA  - Encourage IS  - Diet: FLD  - Monitor I+Os  - OOB/ Ambulate  - DVT ppx: Lovenox  - valium lessened to QD at bedtime.  - cont IV cipro/flagyl  - Agusto dc'ed     A Team Surgery  [id number]

## 2023-02-06 NOTE — DISCHARGE NOTE PROVIDER - NSDCMRMEDTOKEN_GEN_ALL_CORE_FT
amLODIPine 2.5 mg oral tablet: 1 tab(s) orally once a day  betamethasone dipropionate 0.05% topical lotion: Apply topically to affected area , As Needed  ciprofloxacin 500 mg oral tablet: 1 tab(s) orally 2 times a day   finasteride 5 mg oral tablet: 1 tab(s) orally once a day  losartan 25 mg oral tablet: 1 tab(s) orally once a day  meclizine 25 mg oral tablet: 1 tab(s) orally once a day, As Needed  metroNIDAZOLE 500 mg oral tablet: 1 tab(s) orally every 8 hours   omeprazole 20 mg oral delayed release capsule: 1 cap(s) orally once a day   amLODIPine 2.5 mg oral tablet: 1 tab(s) orally once a day  betamethasone dipropionate 0.05% topical lotion: Apply topically to affected area , As Needed  finasteride 5 mg oral tablet: 1 tab(s) orally once a day  losartan 25 mg oral tablet: 1 tab(s) orally once a day  meclizine 25 mg oral tablet: 1 tab(s) orally once a day, As Needed  omeprazole 20 mg oral delayed release capsule: 1 cap(s) orally once a day  augustus richardson:    acetaminophen 325 mg oral tablet: 2-3 tab(s) orally every 6 hours, As needed, Mild Pain (1 - 3)  amLODIPine 2.5 mg oral tablet: 1 tab(s) orally once a day  betamethasone dipropionate 0.05% topical lotion: Apply topically to affected area , As Needed  finasteride 5 mg oral tablet: 1 tab(s) orally once a day  losartan 25 mg oral tablet: 1 tab(s) orally once a day  meclizine 25 mg oral tablet: 1 tab(s) orally once a day, As Needed  omeprazole 20 mg oral delayed release capsule: 1 cap(s) orally once a day  augustus richardson:    acetaminophen 325 mg oral tablet: 2-3 tab(s) orally every 6 hours, As needed, Mild Pain (1 - 3)  amLODIPine 2.5 mg oral tablet: 1 tab(s) orally once a day  betamethasone dipropionate 0.05% topical lotion: Apply topically to affected area , As Needed  finasteride 5 mg oral tablet: 1 tab(s) orally once a day  losartan 25 mg oral tablet: 1 tab(s) orally once a day  meclizine 25 mg oral tablet: 1 tab(s) orally once a day, As Needed  omeprazole 20 mg oral delayed release capsule: 1 cap(s) orally once a day  oxyCODONE 5 mg oral tablet: 1 tab(s) orally every 6 hours MDD:4 tabs  rolling walker:   tamsulosin 0.4 mg oral capsule: 1 cap(s) orally once a day (at bedtime)

## 2023-02-06 NOTE — DISCHARGE NOTE PROVIDER - NSRESEARCHGRANT_PROPHYLAXISRECOMFT_GEN_A_CORE
This is a surgical and/or non-medical patient. This is a surgical and/or non-medical patient.; IMPROVE-DD Application Not Available

## 2023-02-06 NOTE — DISCHARGE NOTE PROVIDER - NSDCHHASSISTDEVIC_GEN_ALL_CORE_FT
Home PT with Rolling Walker. Patient s/p Low Anterior Resection with Colorectal Anastamosis, Colovesicular Takedown, Pelvic Abscess Drainage, Left Ureterolysis & Cystostomy. 2/2/23 Home PT with Rolling Walker. Patient s/p Low Anterior Resection with Colorectal Anastamosis, Colovesicular Takedown, Pelvic Abscess Drainage, Left Ureterolysis & Cystostomy. 2/2/23  Liz Catheter: Please assist in care, emptying, and output documentation.  Home PT with Rolling Walker. Patient s/p Low Anterior Resection with Colorectal Anastamosis, Colovesicular Takedown, Pelvic Abscess Drainage, Left Ureterolysis & Cystostomy. 2/2/23  16 [place] Liz Catheter: Please assist in care, emptying, and output documentation.

## 2023-02-06 NOTE — DISCHARGE NOTE PROVIDER - HOSPITAL COURSE
82M w h/o HTN, HLD, BPH, GERD, vertigo, glaucoma, with recent admission 1/18 for diverticulitis with large 6x6x6 diverticulum, managed non-op, now presenting with persistent weakness and decreased PO intake. Pt has been taking oral cipro since discharge however reports no improvement in his condition. Concern for refractory diverticulitis, taken to the OR 2/2 now s/p LAR with colorectal anastomosis, colovesicular fistula takedown, and pelvic abscess drainage, L ureterolysis & cystoscopy for ureteral catheter placement. Pt was subsequently transferred to the surgical floors for postop recovery, also followed by urology service. 82M w h/o HTN, HLD, BPH, GERD, vertigo, glaucoma, with recent admission 1/18 for diverticulitis with large 6x6x6 diverticulum, managed non-op, now presenting with persistent weakness and decreased PO intake. Pt has been taking oral cipro since discharge however reports no improvement in his condition. Concern for refractory diverticulitis, taken to the OR 2/2 now s/p LAR with colorectal anastomosis, colovesicular fistula takedown, and pelvic abscess drainage, L ureterolysis & cystoscopy for ureteral catheter placement. Pt was subsequently transferred to the surgical floors for postop recovery, also followed by urology service. Liz and PCA subsequently removed 2/6/23 and pt's diet subsequently advanced...................................................    At this point in time patient is stable for discharge to _______. Patient will need wound care in the form of ______. Patient will follow up with Dr. Parada within 1-2 weeks. 82M w h/o HTN, HLD, BPH, GERD, vertigo, glaucoma, with recent admission 1/18 for diverticulitis with large 6x6x6 diverticulum, managed non-op, now presenting with persistent weakness and decreased PO intake. Pt has been taking oral cipro since discharge however reports no improvement in his condition. Concern for refractory diverticulitis, taken to the OR 2/2 and underwent LAR with colorectal anastomosis, colovesicular fistula takedown, and pelvic abscess drainage, L ureterolysis & cystoscopy for ureteral catheter placement. Pt was subsequently transferred to the surgical floors for postop recovery, also followed by urology service. Liz and PCA subsequently removed 2/6/23. As patient began to have GI function, diet, was advanced to clear liquids and then regular diet and tolerated. Pain medication was transitioned from IV to oral pain meds. Patient with baseline difficulty urinating with frequency and urgency (home Urologist Dr. Dozier) which increased after surgery. Urology was called and recommended Oxybutynin, Proscar and Flomax. On 2/8/23 left KANG Drain was removed without difficulty.      At this point in time patient is tolerating Low Fiber Diet, is ambulating with [facility] and Physical Therapy, is voiding, and pain is controlled on oral regimen. Per Dr. Parada. patient is stable for discharge to home.  set up Home PT with Rolling Walker (script given to patient). Patient will follow up with Dr. Parada and Dr. Dozier (home Urologist)  within 1-2 weeks. Patient and wife understand and agree with above. 82M w h/o HTN, HLD, BPH, GERD, vertigo, glaucoma, with recent admission 1/18 for diverticulitis with large 6x6x6 diverticulum, managed non-op, now presenting with persistent weakness and decreased PO intake. Pt has been taking oral cipro since discharge however reports no improvement in his condition. Concern for refractory diverticulitis, taken to the OR 2/2 and underwent LAR with colorectal anastomosis, colovesicular fistula takedown, and pelvic abscess drainage, L ureterolysis & cystoscopy for ureteral catheter placement. Pt was subsequently transferred to the surgical floors for postop recovery, also followed by urology service. Liz and PCA subsequently removed 2/6/23. As patient began to have GI function, diet, was advanced to clear liquids and then regular diet and tolerated. Pain medication was transitioned from IV to oral pain meds. Patient with baseline difficulty urinating with frequency and urgency (home Urologist Dr. Dozier) which increased after surgery. Urology was called and recommended Oxybutynin, Proscar and Flomax. On 2/8/23 left KANG Drain was removed without difficulty.      At this point in time patient is tolerating Low Fiber Diet, is ambulating with [facility] and Physical Therapy, is voiding, and pain is controlled on oral regimen. Per Dr. Parada. patient is stable for discharge to home.  set up Home PT with Rolling Walker (script given to patient). Patient will follow up with Dr. Parada and Dr. Dozier (home Urologist)  within 1-2 days. Patient and wife understand and agree with above. 82M w h/o HTN, HLD, BPH, GERD, vertigo, glaucoma, with recent admission 1/18 for diverticulitis with large 6x6x6 diverticulum, managed non-op, now presenting with persistent weakness and decreased PO intake. Pt has been taking oral cipro since discharge however reports no improvement in his condition. Concern for refractory diverticulitis, taken to the OR 2/2 and underwent LAR with colorectal anastomosis, colovesicular fistula takedown, and pelvic abscess drainage, L ureterolysis & cystoscopy for ureteral catheter placement. Pt was subsequently transferred to the surgical floors for postop recovery, also followed by urology service. As patient began to have GI function, diet, was advanced to clear liquids and then regular diet and tolerated. Pain medication was transitioned from IV to oral pain meds. Patient with baseline difficulty urinating with frequency and urgency (home Urologist Dr. Emanuel Dozier) which increased after surgery. Urology was called and recommended Oxybutynin, Proscar and Flomax. Nix and PCA were removed on 2/6/23.  On 2/8/23, patient with retention. 16[employer] Cou[employer] Nix placed by Urology. On 2/8/23 left KANG Drain was removed without difficulty. Patient will follow up with his home Urologist  in 1 week in office (Dr. Hilton spoke directly to Dr. Emanuel Dozier Urology)      At this point in time patient is tolerating Low Fiber Diet, is ambulating with [facility] and Physical Therapy, is voiding via Nix Catheter, and pain is controlled on oral regimen. Per Dr. Parada. patient is stable for discharge to home.  set up Home PT with [facility] Walker (script given to patient) and nix catheter care/emptying/output documentation. Patient will follow up with Dr. Parada 2/28/22 and Dr. Dozier in 1 week. Patient and wife understand and agree with above.

## 2023-02-06 NOTE — DISCHARGE NOTE PROVIDER - NSDCHHASSISTILLNESS_GEN_ALL_CORE
Home PT with Rolling Walker. Patient s/p Low Anterior Resection with Colorectal Anastamosis, Colovesicular Takedown, Pelvic Abscess Drainage, Left Ureterolysis & Cystostomy. 2/2/23

## 2023-02-06 NOTE — PROGRESS NOTE ADULT - SUBJECTIVE AND OBJECTIVE BOX
Surgery Progress Note     Subjective:  Patient seen and examined. Patient was somnolent but arousable. Patient denies nausea, vomiting, abdominal pain.       Vital Signs:  Vital Signs Last 24 Hrs  T(C): 36.5 (05 Feb 2023 05:30), Max: 37.1 (05 Feb 2023 02:08)  T(F): 97.7 (05 Feb 2023 05:30), Max: 98.8 (05 Feb 2023 02:08)  HR: 66 (05 Feb 2023 05:30) (63 - 70)  BP: 151/62 (05 Feb 2023 05:30) (139/56 - 154/70)  RR: 18 (05 Feb 2023 05:30) (17 - 18)  SpO2: 99% (05 Feb 2023 05:30) (94% - 99%)    Parameters below as of 05 Feb 2023 05:30  Patient On (Oxygen Delivery Method): room air      I&O's Detail    05 Feb 2023 07:01  -  06 Feb 2023 07:00  --------------------------------------------------------  IN:    dextrose 5% + sodium chloride 0.9%: 1650 mL    IV PiggyBack: 925 mL    Oral Fluid: 1780 mL  Total IN: 4355 mL    OUT:    Bulb (mL): 165 mL    Indwelling Catheter - Urethral (mL): 4325 mL  Total OUT: 4490 mL    Total NET: -135 mL         MEDICATIONS  (STANDING):  acetaminophen   IVPB .. 1000 milliGRAM(s) IV Intermittent every 6 hours  amLODIPine   Tablet 2.5 milliGRAM(s) Oral daily  dextrose 5% + sodium chloride 0.9%. 1000 milliLiter(s) (75 mL/Hr) IV Continuous <Continuous>  diazepam    Tablet 5 milliGRAM(s) Oral at bedtime  enoxaparin Injectable 40 milliGRAM(s) SubCutaneous every 24 hours  finasteride 5 milliGRAM(s) Oral daily  HYDROmorphone PCA (1 mG/mL) 30 milliLiter(s) PCA Continuous PCA Continuous  latanoprost 0.005% Ophthalmic Solution 1 Drop(s) Both EYES at bedtime  levoFLOXacin IVPB      levoFLOXacin IVPB 500 milliGRAM(s) IV Intermittent every 24 hours  metoclopramide 10 milliGRAM(s) Oral once  metroNIDAZOLE  IVPB 500 milliGRAM(s) IV Intermittent every 8 hours  metroNIDAZOLE  IVPB      oxybutynin 5 milliGRAM(s) Oral three times a day  pantoprazole  Injectable 40 milliGRAM(s) IV Push daily  polyethylene glycol 3350 17 Gram(s) Oral daily  senna 2 Tablet(s) Oral at bedtime    MEDICATIONS  (PRN):  naloxone Injectable 0.1 milliGRAM(s) IV Push every 3 minutes PRN For ANY of the following changes in patient status:  A. RR LESS THAN 10 breaths per minute, B. Oxygen saturation LESS THAN 90%, C. Sedation score of 6  ondansetron Injectable 4 milliGRAM(s) IV Push every 6 hours PRN Nausea      Physical Exam:  General: NAD, somnolent  Respiratory: Nonlabored respirations  Cardio: pulse present  Abdomen: soft, nondistended, nontender, surgical incisions are c/d/i  : nix slightly pink   Vascular: extremities are warm and well perfused.            Surgery Progress Note     Subjective:  Patient seen and examined. Patient was somnolent but arousable. Patient denies nausea, vomiting, abdominal pain.       Vital Signs:  Vital Signs Last 24 Hrs  T(C): 36.5 (05 Feb 2023 05:30), Max: 37.1 (05 Feb 2023 02:08)  T(F): 97.7 (05 Feb 2023 05:30), Max: 98.8 (05 Feb 2023 02:08)  HR: 66 (05 Feb 2023 05:30) (63 - 70)  BP: 151/62 (05 Feb 2023 05:30) (139/56 - 154/70)  RR: 18 (05 Feb 2023 05:30) (17 - 18)  SpO2: 99% (05 Feb 2023 05:30) (94% - 99%)    Parameters below as of 05 Feb 2023 05:30  Patient On (Oxygen Delivery Method): room air      I&O's Detail    05 Feb 2023 07:01  -  06 Feb 2023 07:00  --------------------------------------------------------  IN:    dextrose 5% + sodium chloride 0.9%: 1650 mL    IV PiggyBack: 925 mL    Oral Fluid: 1780 mL  Total IN: 4355 mL    OUT:    Bulb (mL): 165 mL    Indwelling Catheter - Urethral (mL): 4325 mL  Total OUT: 4490 mL    Total NET: -135 mL         MEDICATIONS  (STANDING):  acetaminophen   IVPB .. 1000 milliGRAM(s) IV Intermittent every 6 hours  amLODIPine   Tablet 2.5 milliGRAM(s) Oral daily  dextrose 5% + sodium chloride 0.9%. 1000 milliLiter(s) (75 mL/Hr) IV Continuous <Continuous>  diazepam    Tablet 5 milliGRAM(s) Oral at bedtime  enoxaparin Injectable 40 milliGRAM(s) SubCutaneous every 24 hours  finasteride 5 milliGRAM(s) Oral daily  HYDROmorphone PCA (1 mG/mL) 30 milliLiter(s) PCA Continuous PCA Continuous  latanoprost 0.005% Ophthalmic Solution 1 Drop(s) Both EYES at bedtime  levoFLOXacin IVPB      levoFLOXacin IVPB 500 milliGRAM(s) IV Intermittent every 24 hours  metoclopramide 10 milliGRAM(s) Oral once  metroNIDAZOLE  IVPB 500 milliGRAM(s) IV Intermittent every 8 hours  metroNIDAZOLE  IVPB      oxybutynin 5 milliGRAM(s) Oral three times a day  pantoprazole  Injectable 40 milliGRAM(s) IV Push daily  polyethylene glycol 3350 17 Gram(s) Oral daily  senna 2 Tablet(s) Oral at bedtime    MEDICATIONS  (PRN):  naloxone Injectable 0.1 milliGRAM(s) IV Push every 3 minutes PRN For ANY of the following changes in patient status:  A. RR LESS THAN 10 breaths per minute, B. Oxygen saturation LESS THAN 90%, C. Sedation score of 6  ondansetron Injectable 4 milliGRAM(s) IV Push every 6 hours PRN Nausea      Physical Exam:  General: NAD, somnolent  Respiratory: Nonlabored respirations  Cardio: pulse present  Abdomen: soft, nondistended, nontender, surgical incisions are c/d/i  : nix slightly pink   Vascular: extremities are warm and well perfused.                             12.1   7.04  )-----------( 271      ( 06 Feb 2023 06:40 )             36.4     02-06    137  |  103  |  5<L>  ----------------------------<  97  3.6   |  25  |  0.74    Ca    8.5      06 Feb 2023 06:40  Phos  2.1     02-06  Mg     1.80     02-06           Surgery Progress Note     Subjective:  Patient seen and examined. Patient was somnolent but arousable. Patient denies nausea, vomiting, abdominal pain. He states he is +/- for flatus/BM.       Vital Signs:  Vital Signs Last 24 Hrs  T(C): 36.5 (05 Feb 2023 05:30), Max: 37.1 (05 Feb 2023 02:08)  T(F): 97.7 (05 Feb 2023 05:30), Max: 98.8 (05 Feb 2023 02:08)  HR: 66 (05 Feb 2023 05:30) (63 - 70)  BP: 151/62 (05 Feb 2023 05:30) (139/56 - 154/70)  RR: 18 (05 Feb 2023 05:30) (17 - 18)  SpO2: 99% (05 Feb 2023 05:30) (94% - 99%)    Parameters below as of 05 Feb 2023 05:30  Patient On (Oxygen Delivery Method): room air      I&O's Detail    05 Feb 2023 07:01  -  06 Feb 2023 07:00  --------------------------------------------------------  IN:    dextrose 5% + sodium chloride 0.9%: 1650 mL    IV PiggyBack: 925 mL    Oral Fluid: 1780 mL  Total IN: 4355 mL    OUT:    Bulb (mL): 165 mL    Indwelling Catheter - Urethral (mL): 4325 mL  Total OUT: 4490 mL    Total NET: -135 mL         MEDICATIONS  (STANDING):  acetaminophen   IVPB .. 1000 milliGRAM(s) IV Intermittent every 6 hours  amLODIPine   Tablet 2.5 milliGRAM(s) Oral daily  dextrose 5% + sodium chloride 0.9%. 1000 milliLiter(s) (75 mL/Hr) IV Continuous <Continuous>  diazepam    Tablet 5 milliGRAM(s) Oral at bedtime  enoxaparin Injectable 40 milliGRAM(s) SubCutaneous every 24 hours  finasteride 5 milliGRAM(s) Oral daily  HYDROmorphone PCA (1 mG/mL) 30 milliLiter(s) PCA Continuous PCA Continuous  latanoprost 0.005% Ophthalmic Solution 1 Drop(s) Both EYES at bedtime  levoFLOXacin IVPB      levoFLOXacin IVPB 500 milliGRAM(s) IV Intermittent every 24 hours  metoclopramide 10 milliGRAM(s) Oral once  metroNIDAZOLE  IVPB 500 milliGRAM(s) IV Intermittent every 8 hours  metroNIDAZOLE  IVPB      oxybutynin 5 milliGRAM(s) Oral three times a day  pantoprazole  Injectable 40 milliGRAM(s) IV Push daily  polyethylene glycol 3350 17 Gram(s) Oral daily  senna 2 Tablet(s) Oral at bedtime    MEDICATIONS  (PRN):  naloxone Injectable 0.1 milliGRAM(s) IV Push every 3 minutes PRN For ANY of the following changes in patient status:  A. RR LESS THAN 10 breaths per minute, B. Oxygen saturation LESS THAN 90%, C. Sedation score of 6  ondansetron Injectable 4 milliGRAM(s) IV Push every 6 hours PRN Nausea      Physical Exam:  General: NAD, somnolent  Respiratory: Nonlabored respirations  Cardio: pulse present  Abdomen: soft, nondistended, nontender, surgical incisions are c/d/i  : nix slightly pink   Vascular: extremities are warm and well perfused.                             12.1   7.04  )-----------( 271      ( 06 Feb 2023 06:40 )             36.4     02-06    137  |  103  |  5<L>  ----------------------------<  97  3.6   |  25  |  0.74    Ca    8.5      06 Feb 2023 06:40  Phos  2.1     02-06  Mg     1.80     02-06

## 2023-02-07 LAB
ANION GAP SERPL CALC-SCNC: 13 MMOL/L — SIGNIFICANT CHANGE UP (ref 7–14)
BUN SERPL-MCNC: 6 MG/DL — LOW (ref 7–23)
CALCIUM SERPL-MCNC: 9 MG/DL — SIGNIFICANT CHANGE UP (ref 8.4–10.5)
CHLORIDE SERPL-SCNC: 103 MMOL/L — SIGNIFICANT CHANGE UP (ref 98–107)
CO2 SERPL-SCNC: 16 MMOL/L — LOW (ref 22–31)
CREAT SERPL-MCNC: 0.73 MG/DL — SIGNIFICANT CHANGE UP (ref 0.5–1.3)
EGFR: 91 ML/MIN/1.73M2 — SIGNIFICANT CHANGE UP
GLUCOSE SERPL-MCNC: 108 MG/DL — HIGH (ref 70–99)
HCT VFR BLD CALC: 39.7 % — SIGNIFICANT CHANGE UP (ref 39–50)
HGB BLD-MCNC: 12.8 G/DL — LOW (ref 13–17)
MAGNESIUM SERPL-MCNC: 2.1 MG/DL — SIGNIFICANT CHANGE UP (ref 1.6–2.6)
MCHC RBC-ENTMCNC: 31.9 PG — SIGNIFICANT CHANGE UP (ref 27–34)
MCHC RBC-ENTMCNC: 32.2 GM/DL — SIGNIFICANT CHANGE UP (ref 32–36)
MCV RBC AUTO: 99 FL — SIGNIFICANT CHANGE UP (ref 80–100)
NRBC # BLD: 0 /100 WBCS — SIGNIFICANT CHANGE UP (ref 0–0)
NRBC # FLD: 0 K/UL — SIGNIFICANT CHANGE UP (ref 0–0)
PHOSPHATE SERPL-MCNC: 2.7 MG/DL — SIGNIFICANT CHANGE UP (ref 2.5–4.5)
PLATELET # BLD AUTO: 268 K/UL — SIGNIFICANT CHANGE UP (ref 150–400)
POTASSIUM SERPL-MCNC: 4.3 MMOL/L — SIGNIFICANT CHANGE UP (ref 3.5–5.3)
POTASSIUM SERPL-SCNC: 4.3 MMOL/L — SIGNIFICANT CHANGE UP (ref 3.5–5.3)
RBC # BLD: 4.01 M/UL — LOW (ref 4.2–5.8)
RBC # FLD: 13.2 % — SIGNIFICANT CHANGE UP (ref 10.3–14.5)
SODIUM SERPL-SCNC: 132 MMOL/L — LOW (ref 135–145)
WBC # BLD: 6.38 K/UL — SIGNIFICANT CHANGE UP (ref 3.8–10.5)
WBC # FLD AUTO: 6.38 K/UL — SIGNIFICANT CHANGE UP (ref 3.8–10.5)

## 2023-02-07 RX ORDER — SODIUM,POTASSIUM PHOSPHATES 278-250MG
2 POWDER IN PACKET (EA) ORAL
Refills: 0 | Status: DISCONTINUED | OUTPATIENT
Start: 2023-02-07 | End: 2023-02-07

## 2023-02-07 RX ORDER — MAGNESIUM SULFATE 500 MG/ML
2 VIAL (ML) INJECTION ONCE
Refills: 0 | Status: COMPLETED | OUTPATIENT
Start: 2023-02-07 | End: 2023-02-07

## 2023-02-07 RX ORDER — ACETAMINOPHEN 500 MG
1000 TABLET ORAL EVERY 6 HOURS
Refills: 0 | Status: COMPLETED | OUTPATIENT
Start: 2023-02-07 | End: 2023-02-08

## 2023-02-07 RX ORDER — OXYCODONE HYDROCHLORIDE 5 MG/1
10 TABLET ORAL EVERY 4 HOURS
Refills: 0 | Status: DISCONTINUED | OUTPATIENT
Start: 2023-02-07 | End: 2023-02-07

## 2023-02-07 RX ORDER — ALPRAZOLAM 0.25 MG
0.5 TABLET ORAL DAILY
Refills: 0 | Status: DISCONTINUED | OUTPATIENT
Start: 2023-02-07 | End: 2023-02-09

## 2023-02-07 RX ADMIN — Medication 100 MILLIGRAM(S): at 06:14

## 2023-02-07 RX ADMIN — Medication 400 MILLIGRAM(S): at 17:49

## 2023-02-07 RX ADMIN — OXYCODONE HYDROCHLORIDE 5 MILLIGRAM(S): 5 TABLET ORAL at 09:28

## 2023-02-07 RX ADMIN — Medication 400 MILLIGRAM(S): at 11:37

## 2023-02-07 RX ADMIN — Medication 400 MILLIGRAM(S): at 23:43

## 2023-02-07 RX ADMIN — TAMSULOSIN HYDROCHLORIDE 0.4 MILLIGRAM(S): 0.4 CAPSULE ORAL at 22:14

## 2023-02-07 RX ADMIN — FINASTERIDE 5 MILLIGRAM(S): 5 TABLET, FILM COATED ORAL at 11:37

## 2023-02-07 RX ADMIN — PANTOPRAZOLE SODIUM 40 MILLIGRAM(S): 20 TABLET, DELAYED RELEASE ORAL at 11:37

## 2023-02-07 RX ADMIN — ENOXAPARIN SODIUM 40 MILLIGRAM(S): 100 INJECTION SUBCUTANEOUS at 23:43

## 2023-02-07 RX ADMIN — Medication 1000 MILLIGRAM(S): at 01:56

## 2023-02-07 RX ADMIN — Medication 1000 MILLIGRAM(S): at 12:07

## 2023-02-07 RX ADMIN — Medication 25 GRAM(S): at 09:19

## 2023-02-07 RX ADMIN — Medication 85 MILLIMOLE(S): at 16:20

## 2023-02-07 RX ADMIN — Medication 100 MILLIGRAM(S): at 14:19

## 2023-02-07 RX ADMIN — OXYCODONE HYDROCHLORIDE 10 MILLIGRAM(S): 5 TABLET ORAL at 21:26

## 2023-02-07 RX ADMIN — OXYCODONE HYDROCHLORIDE 5 MILLIGRAM(S): 5 TABLET ORAL at 15:40

## 2023-02-07 RX ADMIN — OXYCODONE HYDROCHLORIDE 5 MILLIGRAM(S): 5 TABLET ORAL at 15:10

## 2023-02-07 RX ADMIN — OXYCODONE HYDROCHLORIDE 5 MILLIGRAM(S): 5 TABLET ORAL at 01:52

## 2023-02-07 RX ADMIN — OXYCODONE HYDROCHLORIDE 5 MILLIGRAM(S): 5 TABLET ORAL at 09:58

## 2023-02-07 RX ADMIN — Medication 400 MILLIGRAM(S): at 01:26

## 2023-02-07 RX ADMIN — Medication 5 MILLIGRAM(S): at 22:14

## 2023-02-07 RX ADMIN — Medication 5 MILLIGRAM(S): at 14:18

## 2023-02-07 RX ADMIN — OXYCODONE HYDROCHLORIDE 5 MILLIGRAM(S): 5 TABLET ORAL at 02:22

## 2023-02-07 RX ADMIN — Medication 100 MILLIGRAM(S): at 22:14

## 2023-02-07 RX ADMIN — Medication 5 MILLIGRAM(S): at 06:15

## 2023-02-07 RX ADMIN — AMLODIPINE BESYLATE 2.5 MILLIGRAM(S): 2.5 TABLET ORAL at 06:15

## 2023-02-07 RX ADMIN — OXYCODONE HYDROCHLORIDE 10 MILLIGRAM(S): 5 TABLET ORAL at 21:56

## 2023-02-07 RX ADMIN — Medication 1000 MILLIGRAM(S): at 18:19

## 2023-02-07 RX ADMIN — LATANOPROST 1 DROP(S): 0.05 SOLUTION/ DROPS OPHTHALMIC; TOPICAL at 22:14

## 2023-02-07 NOTE — PROGRESS NOTE ADULT - SUBJECTIVE AND OBJECTIVE BOX
Surgery Progress Note     Subjective:  Patient seen and examined. Patient is complaining of urinary retention symptoms. He states he is +/- for flatus/BM.       Vital Signs:  Vital Signs Last 24 Hrs  T(C): 36.6 (07 Feb 2023 05:27), Max: 36.9 (06 Feb 2023 09:26)  T(F): 97.8 (07 Feb 2023 05:27), Max: 98.4 (06 Feb 2023 09:26)  HR: 74 (07 Feb 2023 05:27) (54 - 74)  BP: 129/72 (07 Feb 2023 05:27) (129/72 - 144/74)  RR: 17 (07 Feb 2023 05:27) (17 - 20)  SpO2: 98% (07 Feb 2023 05:27) (96% - 98%)    Parameters below as of 07 Feb 2023 05:27  Patient On (Oxygen Delivery Method): room air        CAPILLARY BLOOD GLUCOSE          I&O's Detail    06 Feb 2023 07:01  -  07 Feb 2023 07:00  --------------------------------------------------------  IN:    dextrose 5% + sodium chloride 0.9%: 450 mL    IV PiggyBack: 300 mL    Oral Fluid: 1300 mL  Total IN: 2050 mL    OUT:    Bulb (mL): 95 mL    Indwelling Catheter - Urethral (mL): 800 mL    Voided (mL): 900 mL  Total OUT: 1795 mL    Total NET: 255 mL            Physical Exam:  General: NAD, somnolent  Respiratory: Nonlabored respirations  Cardio: pulse present  Abdomen: soft, nondistended, nontender, surgical incisions are c/d/i  Vascular: extremities are warm and well perfused.       Labs:    02-06    137  |  103  |  5<L>  ----------------------------<  97  3.6   |  25  |  0.74    Ca    8.5      06 Feb 2023 06:40  Phos  2.1     02-06  Mg     1.80     02-06                              12.1   7.04  )-----------( 271      ( 06 Feb 2023 06:40 )             36.4

## 2023-02-07 NOTE — PROVIDER CONTACT NOTE (OTHER) - ASSESSMENT
A&O 4, VS stable, Pt states he feels like something is wrong in regards to voiding. He feels like he has to void but he isn't urinating all the fluid out. Pt feels something did not go right during surgery.

## 2023-02-07 NOTE — PROGRESS NOTE ADULT - ASSESSMENT
82M w h/o HTN, HLD, BPH, GERD, vertigo, glaucoma, with recent admission 1/18 for diverticulitis with large 6x6x6 diverticulum, managed non-op, now presenting with persistent weakness and decreased PO intake, concerning for refractory diverticulitis now s/p LAR with colorectal anastomosis, colovesicular fistula takedown, and pelvic abscess drainage, L ureterolysis & cystoscopy on 2/2    PLAN:  - Pain control: IV Tylenol, d/c PCA  - Encourage IS  - Diet: regular  - Monitor I+Os  - OOB/ Ambulate  - DVT ppx: Lovenox  - flomax, valium, oxybutinin  - cont IV cipro/flagyl    A Team Surgery  [id number]

## 2023-02-08 ENCOUNTER — TRANSCRIPTION ENCOUNTER (OUTPATIENT)
Age: 83
End: 2023-02-08

## 2023-02-08 LAB
ANION GAP SERPL CALC-SCNC: 9 MMOL/L — SIGNIFICANT CHANGE UP (ref 7–14)
BUN SERPL-MCNC: 14 MG/DL — SIGNIFICANT CHANGE UP (ref 7–23)
CALCIUM SERPL-MCNC: 8.9 MG/DL — SIGNIFICANT CHANGE UP (ref 8.4–10.5)
CHLORIDE SERPL-SCNC: 103 MMOL/L — SIGNIFICANT CHANGE UP (ref 98–107)
CO2 SERPL-SCNC: 24 MMOL/L — SIGNIFICANT CHANGE UP (ref 22–31)
CREAT SERPL-MCNC: 0.87 MG/DL — SIGNIFICANT CHANGE UP (ref 0.5–1.3)
EGFR: 86 ML/MIN/1.73M2 — SIGNIFICANT CHANGE UP
GLUCOSE SERPL-MCNC: 103 MG/DL — HIGH (ref 70–99)
HCT VFR BLD CALC: 36.5 % — LOW (ref 39–50)
HGB BLD-MCNC: 12.3 G/DL — LOW (ref 13–17)
MAGNESIUM SERPL-MCNC: 2.1 MG/DL — SIGNIFICANT CHANGE UP (ref 1.6–2.6)
MCHC RBC-ENTMCNC: 31.8 PG — SIGNIFICANT CHANGE UP (ref 27–34)
MCHC RBC-ENTMCNC: 33.7 GM/DL — SIGNIFICANT CHANGE UP (ref 32–36)
MCV RBC AUTO: 94.3 FL — SIGNIFICANT CHANGE UP (ref 80–100)
NRBC # BLD: 0 /100 WBCS — SIGNIFICANT CHANGE UP (ref 0–0)
NRBC # FLD: 0 K/UL — SIGNIFICANT CHANGE UP (ref 0–0)
PHOSPHATE SERPL-MCNC: 2.4 MG/DL — LOW (ref 2.5–4.5)
PLATELET # BLD AUTO: 326 K/UL — SIGNIFICANT CHANGE UP (ref 150–400)
POTASSIUM SERPL-MCNC: 3.4 MMOL/L — LOW (ref 3.5–5.3)
POTASSIUM SERPL-SCNC: 3.4 MMOL/L — LOW (ref 3.5–5.3)
RBC # BLD: 3.87 M/UL — LOW (ref 4.2–5.8)
RBC # FLD: 13.6 % — SIGNIFICANT CHANGE UP (ref 10.3–14.5)
SODIUM SERPL-SCNC: 136 MMOL/L — SIGNIFICANT CHANGE UP (ref 135–145)
WBC # BLD: 8.34 K/UL — SIGNIFICANT CHANGE UP (ref 3.8–10.5)
WBC # FLD AUTO: 8.34 K/UL — SIGNIFICANT CHANGE UP (ref 3.8–10.5)

## 2023-02-08 RX ORDER — POTASSIUM CHLORIDE 20 MEQ
40 PACKET (EA) ORAL ONCE
Refills: 0 | Status: COMPLETED | OUTPATIENT
Start: 2023-02-08 | End: 2023-02-08

## 2023-02-08 RX ORDER — ACETAMINOPHEN 500 MG
650 TABLET ORAL EVERY 6 HOURS
Refills: 0 | Status: DISCONTINUED | OUTPATIENT
Start: 2023-02-08 | End: 2023-02-08

## 2023-02-08 RX ORDER — LIDOCAINE 4 G/100G
1 CREAM TOPICAL ONCE
Refills: 0 | Status: COMPLETED | OUTPATIENT
Start: 2023-02-08 | End: 2023-02-08

## 2023-02-08 RX ORDER — POTASSIUM CHLORIDE 20 MEQ
10 PACKET (EA) ORAL
Refills: 0 | Status: DISCONTINUED | OUTPATIENT
Start: 2023-02-08 | End: 2023-02-08

## 2023-02-08 RX ORDER — METRONIDAZOLE 500 MG
500 TABLET ORAL EVERY 8 HOURS
Refills: 0 | Status: COMPLETED | OUTPATIENT
Start: 2023-02-08 | End: 2023-02-08

## 2023-02-08 RX ORDER — ACETAMINOPHEN 500 MG
3 TABLET ORAL
Qty: 0 | Refills: 0 | DISCHARGE
Start: 2023-02-08

## 2023-02-08 RX ORDER — ACETAMINOPHEN 500 MG
975 TABLET ORAL EVERY 6 HOURS
Refills: 0 | Status: DISCONTINUED | OUTPATIENT
Start: 2023-02-08 | End: 2023-02-09

## 2023-02-08 RX ORDER — SODIUM,POTASSIUM PHOSPHATES 278-250MG
1 POWDER IN PACKET (EA) ORAL EVERY 4 HOURS
Refills: 0 | Status: COMPLETED | OUTPATIENT
Start: 2023-02-08 | End: 2023-02-08

## 2023-02-08 RX ADMIN — Medication 400 MILLIGRAM(S): at 06:38

## 2023-02-08 RX ADMIN — LIDOCAINE 1 PATCH: 4 CREAM TOPICAL at 21:48

## 2023-02-08 RX ADMIN — Medication 1000 MILLIGRAM(S): at 00:13

## 2023-02-08 RX ADMIN — FINASTERIDE 5 MILLIGRAM(S): 5 TABLET, FILM COATED ORAL at 13:48

## 2023-02-08 RX ADMIN — AMLODIPINE BESYLATE 2.5 MILLIGRAM(S): 2.5 TABLET ORAL at 06:39

## 2023-02-08 RX ADMIN — LATANOPROST 1 DROP(S): 0.05 SOLUTION/ DROPS OPHTHALMIC; TOPICAL at 21:48

## 2023-02-08 RX ADMIN — Medication 500 MILLIGRAM(S): at 21:47

## 2023-02-08 RX ADMIN — TAMSULOSIN HYDROCHLORIDE 0.4 MILLIGRAM(S): 0.4 CAPSULE ORAL at 21:47

## 2023-02-08 RX ADMIN — Medication 500 MILLIGRAM(S): at 13:47

## 2023-02-08 RX ADMIN — Medication 5 MILLIGRAM(S): at 13:48

## 2023-02-08 RX ADMIN — Medication 1000 MILLIGRAM(S): at 07:08

## 2023-02-08 RX ADMIN — Medication 0.5 MILLIGRAM(S): at 15:36

## 2023-02-08 RX ADMIN — Medication 5 MILLIGRAM(S): at 06:39

## 2023-02-08 RX ADMIN — Medication 40 MILLIEQUIVALENT(S): at 13:48

## 2023-02-08 RX ADMIN — Medication 100 MILLIGRAM(S): at 06:38

## 2023-02-08 RX ADMIN — Medication 1 TABLET(S): at 13:47

## 2023-02-08 RX ADMIN — OXYCODONE HYDROCHLORIDE 5 MILLIGRAM(S): 5 TABLET ORAL at 22:17

## 2023-02-08 RX ADMIN — OXYCODONE HYDROCHLORIDE 5 MILLIGRAM(S): 5 TABLET ORAL at 21:47

## 2023-02-08 NOTE — DIETITIAN INITIAL EVALUATION ADULT - PERTINENT MEDS FT
MEDICATIONS  (STANDING):  amLODIPine   Tablet 2.5 milliGRAM(s) Oral daily  enoxaparin Injectable 40 milliGRAM(s) SubCutaneous every 24 hours  finasteride 5 milliGRAM(s) Oral daily  latanoprost 0.005% Ophthalmic Solution 1 Drop(s) Both EYES at bedtime  levoFLOXacin  Tablet 500 milliGRAM(s) Oral every 24 hours  metroNIDAZOLE    Tablet 500 milliGRAM(s) Oral every 8 hours  oxybutynin 5 milliGRAM(s) Oral three times a day  pantoprazole  Injectable 40 milliGRAM(s) IV Push daily  potassium phosphate / sodium phosphate Tablet (K-PHOS No. 2) 1 Tablet(s) Oral every 4 hours  tamsulosin 0.4 milliGRAM(s) Oral at bedtime    MEDICATIONS  (PRN):  ALPRAZolam 0.5 milliGRAM(s) Oral daily PRN anxiety  naloxone Injectable 0.1 milliGRAM(s) IV Push every 3 minutes PRN For ANY of the following changes in patient status:  A. RR LESS THAN 10 breaths per minute, B. Oxygen saturation LESS THAN 90%, C. Sedation score of 6  ondansetron Injectable 4 milliGRAM(s) IV Push every 6 hours PRN Nausea  oxyCODONE    IR 5 milliGRAM(s) Oral every 4 hours PRN Moderate Pain (4 - 6)

## 2023-02-08 NOTE — DISCHARGE NOTE NURSING/CASE MANAGEMENT/SOCIAL WORK - NSDCDMENAME_GEN_ALL_CORE_FT
a rolling walker was provided to you by [facility] [phone]

## 2023-02-08 NOTE — DIETITIAN INITIAL EVALUATION ADULT - ADD RECOMMEND
1. Monitor weights, labs, BM's, skin integrity, p.o. intake.   2. Please document % PO intake in nursing flowsheet.   3. Honor food and beverage preferences within diet restriction of patient in an effort to maximize level of nutrient intake. Services?  PT/OT/SLT Alternating school, 1 week in person, 1 week at home- specialized classes  PT/OT/SLT Alternating in person and remote learning, 1 week in person, 1 week at home- specialized classes  PT/OT/SLT

## 2023-02-08 NOTE — PROVIDER CONTACT NOTE (OTHER) - BACKGROUND
pt is s/p 2/2 LAR w/ colorectal anastomosis
s/p lap ccolorectal anastamosis, left uterolysis cystoscopy

## 2023-02-08 NOTE — DIETITIAN INITIAL EVALUATION ADULT - PERTINENT LABORATORY DATA
02-08    136  |  103  |  14  ----------------------------<  103<H>  3.4<L>   |  24  |  0.87    Ca    8.9      08 Feb 2023 08:03  Phos  2.4     02-08  Mg     2.10     02-08

## 2023-02-08 NOTE — DIETITIAN INITIAL EVALUATION ADULT - REASON FOR ADMISSION
Abdominal pain    Per chart review, 81 y/o Male w h/o HTN, HLD, BPH, GERD, vertigo, glaucoma, with recent admission 1/18 for diverticulitis with large 6x6x6 diverticulum, managed non-op, now presenting with persistent weakness and decreased PO intake, concerning for refractory diverticulitis now s/p LAR with colorectal anastomosis, colovesicular fistula takedown, and pelvic abscess drainage, L ureterolysis & cystoscopy on 2/2/23.

## 2023-02-08 NOTE — PROGRESS NOTE ADULT - ASSESSMENT
82M w h/o HTN, HLD, BPH, GERD, vertigo, glaucoma, with recent admission 1/18 for diverticulitis with large 6x6x6 diverticulum, managed non-op, now presenting with persistent weakness and decreased PO intake, concerning for refractory diverticulitis now s/p LAR with colorectal anastomosis, colovesicular fistula takedown, and pelvic abscess drainage, L ureterolysis & cystoscopy on 2/2    PLAN:  - Oral pain control  - Last dose of levaquin PO  - d/c drain  - d/c home with flomax, ditropan, proscar, home PT  - Diet: regular  - OOB/ Ambulate  - DVT ppx: Lovenox    A Team Surgery  [id number]

## 2023-02-08 NOTE — DIETITIAN INITIAL EVALUATION ADULT - OTHER INFO
Patient reports consuming >% of meals despite having lack of desire to eat. Patient denies any nausea/vomiting/diarrhea/constipation or difficulty chewing and swallowing. +Passing flatus, no bowel movement. NKFA. Reviewed Low fiber diet and written instructions provided.

## 2023-02-08 NOTE — PROGRESS NOTE ADULT - SUBJECTIVE AND OBJECTIVE BOX
Surgery Progress Note     Subjective:  Patient seen and examined. Patient is complaining of urinary retention symptoms. He states he is +/- for flatus/BM. -N/V. Pain otherwise controlled.      Vital Signs Last 24 Hrs  T(C): 36.2 (08 Feb 2023 09:44), Max: 37 (07 Feb 2023 17:08)  T(F): 97.2 (08 Feb 2023 09:44), Max: 98.6 (07 Feb 2023 17:08)  HR: 82 (08 Feb 2023 09:44) (57 - 82)  BP: 145/73 (08 Feb 2023 09:44) (122/56 - 145/73)  BP(mean): --  RR: 18 (08 Feb 2023 09:44) (16 - 18)  SpO2: 99% (08 Feb 2023 09:44) (97% - 100%)    Parameters below as of 08 Feb 2023 05:20  Patient On (Oxygen Delivery Method): room air      I&O's Detail    07 Feb 2023 07:01  -  08 Feb 2023 07:00  --------------------------------------------------------  IN:    Oral Fluid: 560 mL  Total IN: 560 mL    OUT:    Bulb (mL): 115 mL    Voided (mL): 950 mL  Total OUT: 1065 mL    Total NET: -505 mL      08 Feb 2023 07:01  -  08 Feb 2023 12:20  --------------------------------------------------------  IN:    Oral Fluid: 100 mL  Total IN: 100 mL    OUT:  Total OUT: 0 mL    Total NET: 100 mL      Physical Exam:  General: NAD, somnolent  Respiratory: Nonlabored respirations  Cardio: pulse present  Abdomen: soft, nondistended, nontender, surgical incisions are c/d/i  Vascular: extremities are warm and well perfused.       Labs:                          12.3   8.34  )-----------( 326      ( 08 Feb 2023 08:03 )             36.5     02-08    136  |  103  |  14  ----------------------------<  103<H>  3.4<L>   |  24  |  0.87    Ca    8.9      08 Feb 2023 08:03  Phos  2.4     02-08  Mg     2.10     02-08

## 2023-02-08 NOTE — CHART NOTE - NSCHARTNOTEFT_GEN_A_CORE
Surgery Post-Op Note    Pre-Op Dx: Colovesical fistula  Procedure: Repair, fistula, colovesical      Surgeon: Dr. Parada    SUBJECTIVE:  Pt seen and examined at the bedside. Pt feels fatigued and weak. Was nauseous earlier but resolved. Pain controlled with PCA pump.     OBJECTIVE:  Vital Signs Last 24 Hrs  T(C): 37.3 (02 Feb 2023 20:00), Max: 37.3 (02 Feb 2023 20:00)  T(F): 99.2 (02 Feb 2023 20:00), Max: 99.2 (02 Feb 2023 20:00)  HR: 64 (02 Feb 2023 20:45) (59 - 73)  BP: 131/60 (02 Feb 2023 20:45) (107/49 - 149/65)  BP(mean): 78 (02 Feb 2023 20:45) (59 - 86)  RR: 17 (02 Feb 2023 20:45) (12 - 20)  SpO2: 98% (02 Feb 2023 20:45) (95% - 100%)    Parameters below as of 02 Feb 2023 19:00  Patient On (Oxygen Delivery Method): room air        Physical Exam:  General: NAD, resting comfortably in bed  Neuro: A/O x 3, no focal deficits  Pulmonary: Nonlabored breathing, no respiratory distress  Cardiovascular: NSR  Abdominal: soft, ATTP. ND, incisions c/d/i. R Blaze drain with serosang output  Extremities: WWP    LABS:                        14.8   13.18 )-----------( 298      ( 02 Feb 2023 17:55 )             46.0     02-02    132<L>  |  99  |  12  ----------------------------<  187<H>  3.7   |  21<L>  |  0.98    Ca    8.8      02 Feb 2023 17:55  Phos  2.9     02-02  Mg     2.30     02-02    TPro  7.4  /  Alb  4.1  /  TBili  0.4  /  DBili  x   /  AST  34  /  ALT  62<H>  /  AlkPhos  103  02-01    PT/INR - ( 02 Feb 2023 06:30 )   PT: 13.1 sec;   INR: 1.13 ratio         PTT - ( 02 Feb 2023 06:30 )  PTT:32.9 sec  CAPILLARY BLOOD GLUCOSE          LIVER FUNCTIONS - ( 01 Feb 2023 10:56 )  Alb: 4.1 g/dL / Pro: 7.4 g/dL / ALK PHOS: 103 U/L / ALT: 62 U/L / AST: 34 U/L / GGT: x                 ASSESSMENT: 82M w h/o HTN, HLD, BPH, GERD, vertigo, glaucoma, with recent admission 1/18 for diverticulitis with large 6x6x6 diverticulum, managed non-op, now presenting with persistent weakness and decreased PO intake, concerning for refractory diverticulitis now s/p LAR with colorectal anastomosis, colovesicular fistula takedown, and pelvic abscess drainage, L ureterolysis & cystoscopy.    PLAN:  - Pain control: IV Tylenol & PCA pump  - Encourage IS  - Diet: NPO/IVF  - Monitor I+Os  - OOB/ Ambulate  - DVT ppx: Lovenox  - f/u PACU labs  - IV abx    A Team Surgery  [id number]
Urology contacted because patient feeling suprapubic discomfort, voiding small amounts (25-50 mL), with bladder scan showing 327 mL. Patient remains on Ditropan for bladder spasms despite no Liz catheter.    Discussed with patient that he is likely in urinary retention and is having difficulty voiding due to multiple factors, including continued administration of Ditropan.    Recommend:  - Discontinue Ditropan  - Continue Flomax  - Liz placement for 24-48 hours  - Repeat TOV on Friday morning
KNAG Drain taken off suction. Suture removed. Drain removed. Dry sterile dressing placed and pressure held x 5 minutes. No drainage from site. New dry sterile dressing placed. Patient tolerated well.     #[id number]  A Team Surgery
Patient can maintain nix per primary team. Can use ditropan 5mg BID/TID for bladder spasms.

## 2023-02-08 NOTE — DISCHARGE NOTE NURSING/CASE MANAGEMENT/SOCIAL WORK - PATIENT PORTAL LINK FT
You can access the FollowMyHealth Patient Portal offered by [facility] by registering at the following website: [email or web address]. By joining [facility]’s FollowMyHealth portal, you will also be able to view your health information using other applications (apps) compatible with our system.

## 2023-02-08 NOTE — PROVIDER CONTACT NOTE (OTHER) - REASON
Pt states he feels like something is wrong in regards to voiding. Would like to speak to MD
/54, HR 63

## 2023-02-09 VITALS
HEART RATE: 66 BPM | OXYGEN SATURATION: 98 % | SYSTOLIC BLOOD PRESSURE: 122 MMHG | RESPIRATION RATE: 18 BRPM | DIASTOLIC BLOOD PRESSURE: 77 MMHG | TEMPERATURE: 98 F

## 2023-02-09 LAB
ANION GAP SERPL CALC-SCNC: 7 MMOL/L — SIGNIFICANT CHANGE UP (ref 7–14)
BUN SERPL-MCNC: 14 MG/DL — SIGNIFICANT CHANGE UP (ref 7–23)
CALCIUM SERPL-MCNC: 8.7 MG/DL — SIGNIFICANT CHANGE UP (ref 8.4–10.5)
CHLORIDE SERPL-SCNC: 106 MMOL/L — SIGNIFICANT CHANGE UP (ref 98–107)
CO2 SERPL-SCNC: 24 MMOL/L — SIGNIFICANT CHANGE UP (ref 22–31)
CREAT SERPL-MCNC: 0.89 MG/DL — SIGNIFICANT CHANGE UP (ref 0.5–1.3)
EGFR: 86 ML/MIN/1.73M2 — SIGNIFICANT CHANGE UP
GLUCOSE SERPL-MCNC: 89 MG/DL — SIGNIFICANT CHANGE UP (ref 70–99)
HCT VFR BLD CALC: 31.7 % — LOW (ref 39–50)
HGB BLD-MCNC: 10.6 G/DL — LOW (ref 13–17)
MAGNESIUM SERPL-MCNC: 2.1 MG/DL — SIGNIFICANT CHANGE UP (ref 1.6–2.6)
MCHC RBC-ENTMCNC: 31.5 PG — SIGNIFICANT CHANGE UP (ref 27–34)
MCHC RBC-ENTMCNC: 33.4 GM/DL — SIGNIFICANT CHANGE UP (ref 32–36)
MCV RBC AUTO: 94.1 FL — SIGNIFICANT CHANGE UP (ref 80–100)
NRBC # BLD: 0 /100 WBCS — SIGNIFICANT CHANGE UP (ref 0–0)
NRBC # FLD: 0 K/UL — SIGNIFICANT CHANGE UP (ref 0–0)
PHOSPHATE SERPL-MCNC: 2.3 MG/DL — LOW (ref 2.5–4.5)
PLATELET # BLD AUTO: 277 K/UL — SIGNIFICANT CHANGE UP (ref 150–400)
POTASSIUM SERPL-MCNC: 3.9 MMOL/L — SIGNIFICANT CHANGE UP (ref 3.5–5.3)
POTASSIUM SERPL-SCNC: 3.9 MMOL/L — SIGNIFICANT CHANGE UP (ref 3.5–5.3)
RBC # BLD: 3.37 M/UL — LOW (ref 4.2–5.8)
RBC # FLD: 13.6 % — SIGNIFICANT CHANGE UP (ref 10.3–14.5)
SODIUM SERPL-SCNC: 137 MMOL/L — SIGNIFICANT CHANGE UP (ref 135–145)
WBC # BLD: 7.45 K/UL — SIGNIFICANT CHANGE UP (ref 3.8–10.5)
WBC # FLD AUTO: 7.45 K/UL — SIGNIFICANT CHANGE UP (ref 3.8–10.5)

## 2023-02-09 RX ORDER — TAMSULOSIN HYDROCHLORIDE 0.4 MG/1
1 CAPSULE ORAL
Qty: 30 | Refills: 0
Start: 2023-02-09

## 2023-02-09 RX ORDER — OXYCODONE HYDROCHLORIDE 5 MG/1
1 TABLET ORAL
Qty: 24 | Refills: 0
Start: 2023-02-09 | End: 2023-02-14

## 2023-02-09 RX ORDER — TAMSULOSIN HYDROCHLORIDE 0.4 MG/1
1 CAPSULE ORAL
Qty: 0 | Refills: 0 | DISCHARGE
Start: 2023-02-09

## 2023-02-09 RX ADMIN — Medication 85 MILLIMOLE(S): at 10:30

## 2023-02-09 RX ADMIN — LIDOCAINE 1 PATCH: 4 CREAM TOPICAL at 09:48

## 2023-02-09 RX ADMIN — AMLODIPINE BESYLATE 2.5 MILLIGRAM(S): 2.5 TABLET ORAL at 07:05

## 2023-02-09 RX ADMIN — ENOXAPARIN SODIUM 40 MILLIGRAM(S): 100 INJECTION SUBCUTANEOUS at 01:30

## 2023-02-09 RX ADMIN — Medication 975 MILLIGRAM(S): at 17:11

## 2023-02-09 RX ADMIN — FINASTERIDE 5 MILLIGRAM(S): 5 TABLET, FILM COATED ORAL at 12:54

## 2023-02-09 RX ADMIN — LIDOCAINE 1 PATCH: 4 CREAM TOPICAL at 07:17

## 2023-02-09 RX ADMIN — Medication 975 MILLIGRAM(S): at 07:09

## 2023-02-09 RX ADMIN — Medication 975 MILLIGRAM(S): at 07:39

## 2023-02-09 RX ADMIN — Medication 975 MILLIGRAM(S): at 17:47

## 2023-02-09 NOTE — PROGRESS NOTE ADULT - ATTENDING COMMENTS
Pt seen/examined. Tolerating diet, positive bowel function. Still c/o bladder discomfort similar to before surgery. Bladder problems for a long time.    - d/c drain  - d/c antibx  - pain control  -  f/u as outpt  - d/c planning
Pt seen/examined earlier today. Agree with above    - start CLD  - ambulate  -  F/U for bladder spasms and pain
Pt seen/examined, overall without major c/o. Tolerating diet. Positive bowel function.  Liz replaced by  yesterday for retention.  D/W'd pt and his wife at length, offered d/c home with leg bag and VNS.  Spoke with Dr. Dozier (pt's ), he agreed with plan and will see pt in the office next wk to remove Liz.    - d/c home with Liz and leg bag  - f/u with Dr. Emanuel Dozier next wk (pt to call office for appt)  - f/u with Dr. Parada 2/28 (call for appt)

## 2023-02-09 NOTE — PROGRESS NOTE ADULT - PROVIDER SPECIALTY LIST ADULT
Colorectal Surgery
Pain Medicine
Colorectal Surgery
Pain Medicine
Pain Medicine
Colorectal Surgery
Colorectal Surgery
Urology

## 2023-02-09 NOTE — PROGRESS NOTE ADULT - ASSESSMENT
82M w h/o HTN, HLD, BPH, GERD, vertigo, glaucoma, with recent admission 1/18 for diverticulitis with large 6x6x6 diverticulum, managed non-op, now presenting with persistent weakness and decreased PO intake, concerning for refractory diverticulitis now s/p LAR with colorectal anastomosis, colovesicular fistula takedown, and pelvic abscess drainage, L ureterolysis & cystoscopy on 2/2    PLAN:  - Oral pain control  - Last dose of levaquin PO  - d/c home with home PT  - Diet: regular  - OOB/ Ambulate  - DVT ppx: Lovenox    A Team Surgery  [id number]

## 2023-02-09 NOTE — PROGRESS NOTE ADULT - SUBJECTIVE AND OBJECTIVE BOX
Surgery Progress Note     Subjective:  Patient seen and examined. Patient is complaining of urinary retention symptoms. He states he is +/- for flatus/BM. -N/V. Pain otherwise controlled.       Vital Signs Last 24 Hrs  T(C): 37 (09 Feb 2023 09:27), Max: 37.6 (08 Feb 2023 20:55)  T(F): 98.6 (09 Feb 2023 09:27), Max: 99.6 (08 Feb 2023 20:55)  HR: 62 (09 Feb 2023 09:27) (57 - 65)  BP: 134/65 (09 Feb 2023 09:27) (121/54 - 140/62)  BP(mean): --  RR: 18 (09 Feb 2023 09:27) (16 - 18)  SpO2: 99% (09 Feb 2023 09:27) (96% - 99%)    Parameters below as of 09 Feb 2023 06:50  Patient On (Oxygen Delivery Method): room air      I&O's Detail    08 Feb 2023 07:01  -  09 Feb 2023 07:00  --------------------------------------------------------  IN:    Oral Fluid: 410 mL  Total IN: 410 mL    OUT:    Indwelling Catheter - Urethral (mL): 2430 mL  Total OUT: 2430 mL    Total NET: -2020 mL      09 Feb 2023 07:01  -  09 Feb 2023 14:18  --------------------------------------------------------  IN:  Total IN: 0 mL    OUT:    Indwelling Catheter - Urethral (mL): 0 mL  Total OUT: 0 mL    Total NET: 0 mL      Physical Exam:  General: NAD, somnolent  Respiratory: Nonlabored respirations  Cardio: pulse present  Abdomen: soft, nondistended, nontender, surgical incisions are c/d/i  Vascular: extremities are warm and well perfused.       Labs:                                               10.6   7.45  )-----------( 277      ( 09 Feb 2023 07:26 )             31.7     02-09    137  |  106  |  14  ----------------------------<  89  3.9   |  24  |  0.89    Ca    8.7      09 Feb 2023 07:26  Phos  2.3     02-09  Mg     2.10     02-09

## 2023-02-09 NOTE — PROGRESS NOTE ADULT - REASON FOR ADMISSION
Refractory diverticulitis

## 2023-02-15 LAB — SURGICAL PATHOLOGY STUDY: SIGNIFICANT CHANGE UP

## 2023-03-07 ENCOUNTER — RX RENEWAL (OUTPATIENT)
Age: 83
End: 2023-03-07

## 2023-06-05 ENCOUNTER — APPOINTMENT (OUTPATIENT)
Dept: INTERNAL MEDICINE | Facility: CLINIC | Age: 83
End: 2023-06-05
Payer: MEDICARE

## 2023-06-05 ENCOUNTER — RX RENEWAL (OUTPATIENT)
Age: 83
End: 2023-06-05

## 2023-06-05 VITALS
WEIGHT: 145 LBS | HEART RATE: 61 BPM | HEIGHT: 70 IN | SYSTOLIC BLOOD PRESSURE: 148 MMHG | BODY MASS INDEX: 20.76 KG/M2 | DIASTOLIC BLOOD PRESSURE: 78 MMHG

## 2023-06-05 PROCEDURE — 36415 COLL VENOUS BLD VENIPUNCTURE: CPT

## 2023-06-05 PROCEDURE — 99215 OFFICE O/P EST HI 40 MIN: CPT | Mod: 25

## 2023-06-05 RX ORDER — DORZOLAMIDE HYDROCHLORIDE 20 MG/ML
SOLUTION OPHTHALMIC TWICE DAILY
Refills: 0 | Status: DISCONTINUED | COMMUNITY
End: 2023-06-05

## 2023-06-06 LAB
ALBUMIN SERPL ELPH-MCNC: 4.6 G/DL
ALP BLD-CCNC: 123 U/L
ALT SERPL-CCNC: 20 U/L
ANION GAP SERPL CALC-SCNC: 13 MMOL/L
APPEARANCE: CLEAR
AST SERPL-CCNC: 22 U/L
BACTERIA: NEGATIVE /HPF
BILIRUB SERPL-MCNC: 0.4 MG/DL
BILIRUBIN URINE: NEGATIVE
BLOOD URINE: NEGATIVE
BUN SERPL-MCNC: 19 MG/DL
CALCIUM SERPL-MCNC: 10.2 MG/DL
CAST: 0 /LPF
CHLORIDE SERPL-SCNC: 103 MMOL/L
CHOLEST SERPL-MCNC: 198 MG/DL
CO2 SERPL-SCNC: 24 MMOL/L
COLOR: YELLOW
CREAT SERPL-MCNC: 1.08 MG/DL
EGFR: 68 ML/MIN/1.73M2
EPITHELIAL CELLS: 0 /HPF
GLUCOSE QUALITATIVE U: NEGATIVE MG/DL
GLUCOSE SERPL-MCNC: 90 MG/DL
HDLC SERPL-MCNC: 74 MG/DL
KETONES URINE: NEGATIVE MG/DL
LDLC SERPL CALC-MCNC: 110 MG/DL
LEUKOCYTE ESTERASE URINE: NEGATIVE
MICROSCOPIC-UA: NORMAL
NITRITE URINE: NEGATIVE
NONHDLC SERPL-MCNC: 124 MG/DL
PH URINE: 6.5
POTASSIUM SERPL-SCNC: 4.4 MMOL/L
PROT SERPL-MCNC: 7.6 G/DL
PROTEIN URINE: NEGATIVE MG/DL
RED BLOOD CELLS URINE: 0 /HPF
SODIUM SERPL-SCNC: 139 MMOL/L
SPECIFIC GRAVITY URINE: 1.01
TRIGL SERPL-MCNC: 68 MG/DL
UROBILINOGEN URINE: 0.2 MG/DL
WHITE BLOOD CELLS URINE: 0 /HPF

## 2023-06-06 NOTE — HISTORY OF PRESENT ILLNESS
[de-identified] : Patient is here as a follow up of his Diverticular surgery.  He feels very fatigued most days.  This feeling predated his surgery by many years. He feels that when he has a large bowel movement he "sore" in his abdomen. He feels that it is getting better slowly.

## 2023-06-06 NOTE — ASSESSMENT
[FreeTextEntry1] : I have reviewed notes by his pulmonary doctors from 2019 and 2022. I strongly feel that he should have a sleep study.  He reports that his wife says that he snores at night.  His symptoms are suggestive that he should be screened for LION..\par \par He seems to be doing well post operatively from his sigmoid resection and Diverticular abscess.\par \par Will get U/A, CBC and CMP.\par \par I have discussed with the patient what is generally advised as far as diet with regards to Diverticulosis.  In his case I have advised him to avoid nuts and seeds as much as practically can be done.  We did discuss that there are no conclusive studies that indicate that dietary restrictions have any bearing on the outcome of diverticulosis.\par \par His questions were answered.  \par \par He is doing well and the remaining symptom of daytime fatigue is his predominant symptom currently.  He will discuss this with his pulmonary doctor at his next visit.  He does understand that he needs to have a repeat CT of the chest in August.\par \par I have spent a total of 45 minutes on the day of the visit both face to face and non-face to face with the patient.\par .\par \par

## 2023-06-07 ENCOUNTER — NON-APPOINTMENT (OUTPATIENT)
Age: 83
End: 2023-06-07

## 2023-06-13 DIAGNOSIS — M54.9 DORSALGIA, UNSPECIFIED: ICD-10-CM

## 2023-07-18 ENCOUNTER — APPOINTMENT (OUTPATIENT)
Dept: PULMONOLOGY | Facility: CLINIC | Age: 83
End: 2023-07-18
Payer: MEDICARE

## 2023-07-18 VITALS — HEART RATE: 60 BPM | DIASTOLIC BLOOD PRESSURE: 67 MMHG | SYSTOLIC BLOOD PRESSURE: 152 MMHG | OXYGEN SATURATION: 98 %

## 2023-07-18 DIAGNOSIS — R53.83 OTHER FATIGUE: ICD-10-CM

## 2023-07-18 PROCEDURE — 99214 OFFICE O/P EST MOD 30 MIN: CPT

## 2023-07-18 NOTE — ASSESSMENT
[FreeTextEntry1] : obtain ct chest now to fu nodules\par \par we discussed LION testing (home test) he does not feel that he has lion and would like to hold off for now, he will think about it and we will discuss again at next visit.

## 2023-07-18 NOTE — PROCEDURE
[FreeTextEntry1] : Prior exams reviewed:\par \par PFT: mild obstruction without a significant bronchodilator response.  Lung volumes normal. DLCO normal.  FVC improved from 2019.\par \par \par EXAM: [id number] - CT CHEST - ORDERED BY: ARVIN CLEMENS\par \par \par PROCEDURE DATE: 08/04/2022\par \par \par \par INTERPRETATION: INDICATION: Dyspnea on exertion\par \par TECHNIQUE: Volumetric images of the chest without intravenous contrast. Maximum intensity projection images were generated.\par \par COMPARISON: CT abdomen and pelvis August 6, 2015.\par \par FINDINGS:\par \par CARDIOVASCULAR, MEDIASTINUM, THYROID:\par Visualized thyroid gland: Grossly unremarkable.\par \par Vasculature:\par Thoracic aorta: Normal in caliber and course. The ascending aorta measures 3.7 cm at the level of the main pulmonary artery and the descending aorta measures 2.4 cm at the same level. Mild atherosclerotic calcification of the thoracic aorta. There are aortic valvular calcifications.\par Main pulmonary artery: Normal in caliber.\par \par Heart and pericardium: Normal heart size. No pericardial effusion. Multivessel coronary artery calcifications are visualized.\par \par Esophagus: Normally decompressed.\par \par LYMPH NODES: No mediastinal or axillary lymphadenopathy. No gross hilar adenopathy.\par \par MAJOR AIRWAYS: Patent trachea and mainstem bronchi.\par \par LUNGS: No focal consolidation. No evidence of interstitial lung disease.\par \par Scattered indeterminate pulmonary nodules including a 4 mm right upper lobe nodule (series 3, image 256) and 2 mm right lower lobe nodule (series 3, image 395). There is a 2 mm right middle lobe nodule abutting the minor fissure (series 3, image 365), likely representing an intrapulmonary lymph node.\par \par PLEURA: No pleural effusion or pneumothorax.\par \par UPPER ABDOMEN: Evaluation of the partially-imaged upper abdomen demonstrates no acute abnormality.\par \par CHEST WALL: Normal.\par \par BONES: Mild multilevel degenerative disc disease.\par \par IMPRESSION:\par \par No evidence of pneumonia, pleural effusion, or pneumothorax.\par \par Scattered bilateral indeterminate pulmonary nodules measuring up to 4 mm in size, for which a follow-up chest CT can be considered in one year if this patient has an elevated risk of lung cancer (such as a history of cigarette smoking, family history of lung cancer, or has had significant exposure to asbestos or radon).\par \par --- End of Report ---\par \par \par \par \par \par \par  SAMI JONES DO; Attending Radiologist\par This document has been electronically signed. Aug 4 2022 1:09PM

## 2023-07-18 NOTE — HISTORY OF PRESENT ILLNESS
[Former] : former [TextBox_4] : due for lung nodule fu\par since last visit had noncancerous growth removed from colon\par has been feeling more tired lately\par pcp wants him tested for jami\par he denies snoring/choking/gasping, does not think he has jami\par he is very active/exercises no pulm complaints

## 2023-07-20 ENCOUNTER — OUTPATIENT (OUTPATIENT)
Dept: OUTPATIENT SERVICES | Facility: HOSPITAL | Age: 83
LOS: 1 days | End: 2023-07-20
Payer: MEDICARE

## 2023-07-20 ENCOUNTER — APPOINTMENT (OUTPATIENT)
Dept: CT IMAGING | Facility: CLINIC | Age: 83
End: 2023-07-20
Payer: MEDICARE

## 2023-07-20 DIAGNOSIS — R91.8 OTHER NONSPECIFIC ABNORMAL FINDING OF LUNG FIELD: ICD-10-CM

## 2023-07-20 PROCEDURE — 71250 CT THORAX DX C-: CPT

## 2023-07-20 PROCEDURE — 71250 CT THORAX DX C-: CPT | Mod: 26,MH

## 2023-08-01 ENCOUNTER — APPOINTMENT (OUTPATIENT)
Dept: PULMONOLOGY | Facility: CLINIC | Age: 83
End: 2023-08-01
Payer: MEDICARE

## 2023-08-01 VITALS — HEART RATE: 52 BPM | OXYGEN SATURATION: 99 % | SYSTOLIC BLOOD PRESSURE: 129 MMHG | DIASTOLIC BLOOD PRESSURE: 57 MMHG

## 2023-08-01 PROCEDURE — 99213 OFFICE O/P EST LOW 20 MIN: CPT

## 2023-08-01 NOTE — HISTORY OF PRESENT ILLNESS
[TextBox_4] : had repeat ct, stable nodules from last year he is feeling well still does not want to pursue sleep testing

## 2023-08-01 NOTE — PROCEDURE
[FreeTextEntry1] : Prior exams reviewed:  PFT: mild obstruction without a significant bronchodilator response.  Lung volumes normal. DLCO normal.  FVC improved from 2019.   EXAM: [id number] - CT CHEST - ORDERED BY: ARVIN CLEMENS   PROCEDURE DATE: 08/04/2022    INTERPRETATION: INDICATION: Dyspnea on exertion  TECHNIQUE: Volumetric images of the chest without intravenous contrast. Maximum intensity projection images were generated.  COMPARISON: CT abdomen and pelvis August 6, 2015.  FINDINGS:  CARDIOVASCULAR, MEDIASTINUM, THYROID: Visualized thyroid gland: Grossly unremarkable.  Vasculature: Thoracic aorta: Normal in caliber and course. The ascending aorta measures 3.7 cm at the level of the main pulmonary artery and the descending aorta measures 2.4 cm at the same level. Mild atherosclerotic calcification of the thoracic aorta. There are aortic valvular calcifications. Main pulmonary artery: Normal in caliber.  Heart and pericardium: Normal heart size. No pericardial effusion. Multivessel coronary artery calcifications are visualized.  Esophagus: Normally decompressed.  LYMPH NODES: No mediastinal or axillary lymphadenopathy. No gross hilar adenopathy.  MAJOR AIRWAYS: Patent trachea and mainstem bronchi.  LUNGS: No focal consolidation. No evidence of interstitial lung disease.  Scattered indeterminate pulmonary nodules including a 4 mm right upper lobe nodule (series 3, image 256) and 2 mm right lower lobe nodule (series 3, image 395). There is a 2 mm right middle lobe nodule abutting the minor fissure (series 3, image 365), likely representing an intrapulmonary lymph node.  PLEURA: No pleural effusion or pneumothorax.  UPPER ABDOMEN: Evaluation of the partially-imaged upper abdomen demonstrates no acute abnormality.  CHEST WALL: Normal.  BONES: Mild multilevel degenerative disc disease.  IMPRESSION:  No evidence of pneumonia, pleural effusion, or pneumothorax.  Scattered bilateral indeterminate pulmonary nodules measuring up to 4 mm in size, for which a follow-up chest CT can be considered in one year if this patient has an elevated risk of lung cancer (such as a history of cigarette smoking, family history of lung cancer, or has had significant exposure to asbestos or radon).  --- End of Report ---        SAMI JONES DO; Attending Radiologist This document has been electronically signed. Aug 4 2022 1:09PM

## 2023-11-20 ENCOUNTER — APPOINTMENT (OUTPATIENT)
Dept: INTERNAL MEDICINE | Facility: CLINIC | Age: 83
End: 2023-11-20
Payer: MEDICARE

## 2023-11-20 DIAGNOSIS — U07.1 COVID-19: ICD-10-CM

## 2023-11-20 PROCEDURE — 99442: CPT | Mod: 95

## 2023-11-20 RX ORDER — METRONIDAZOLE 250 MG/1
250 TABLET, FILM COATED ORAL
Refills: 0 | Status: DISCONTINUED | COMMUNITY
End: 2023-11-20

## 2023-12-19 ENCOUNTER — APPOINTMENT (OUTPATIENT)
Dept: INTERNAL MEDICINE | Facility: CLINIC | Age: 83
End: 2023-12-19
Payer: MEDICARE

## 2023-12-19 VITALS
BODY MASS INDEX: 20.9 KG/M2 | WEIGHT: 146 LBS | HEART RATE: 56 BPM | DIASTOLIC BLOOD PRESSURE: 70 MMHG | HEIGHT: 70 IN | OXYGEN SATURATION: 99 % | SYSTOLIC BLOOD PRESSURE: 137 MMHG

## 2023-12-19 PROCEDURE — G0008: CPT

## 2023-12-19 PROCEDURE — 99214 OFFICE O/P EST MOD 30 MIN: CPT | Mod: 25

## 2023-12-19 PROCEDURE — 90662 IIV NO PRSV INCREASED AG IM: CPT

## 2023-12-19 PROCEDURE — 36415 COLL VENOUS BLD VENIPUNCTURE: CPT

## 2023-12-20 LAB
ALBUMIN SERPL ELPH-MCNC: 4.5 G/DL
ALP BLD-CCNC: 122 U/L
ALT SERPL-CCNC: 18 U/L
ANION GAP SERPL CALC-SCNC: 11 MMOL/L
AST SERPL-CCNC: 21 U/L
BASOPHILS # BLD AUTO: 0.05 K/UL
BASOPHILS NFR BLD AUTO: 0.6 %
BILIRUB SERPL-MCNC: 0.3 MG/DL
BUN SERPL-MCNC: 22 MG/DL
CALCIUM SERPL-MCNC: 10.3 MG/DL
CHLORIDE SERPL-SCNC: 100 MMOL/L
CHOLEST SERPL-MCNC: 180 MG/DL
CO2 SERPL-SCNC: 26 MMOL/L
CREAT SERPL-MCNC: 1.22 MG/DL
EGFR: 59 ML/MIN/1.73M2
EOSINOPHIL # BLD AUTO: 0.24 K/UL
EOSINOPHIL NFR BLD AUTO: 3 %
GLUCOSE SERPL-MCNC: 103 MG/DL
HCT VFR BLD CALC: 43 %
HDLC SERPL-MCNC: 65 MG/DL
HGB BLD-MCNC: 14.2 G/DL
IMM GRANULOCYTES NFR BLD AUTO: 0.4 %
LDLC SERPL CALC-MCNC: 102 MG/DL
LYMPHOCYTES # BLD AUTO: 1.39 K/UL
LYMPHOCYTES NFR BLD AUTO: 17.6 %
MAN DIFF?: NORMAL
MCHC RBC-ENTMCNC: 32.3 PG
MCHC RBC-ENTMCNC: 33 GM/DL
MCV RBC AUTO: 97.7 FL
MONOCYTES # BLD AUTO: 0.76 K/UL
MONOCYTES NFR BLD AUTO: 9.6 %
NEUTROPHILS # BLD AUTO: 5.41 K/UL
NEUTROPHILS NFR BLD AUTO: 68.8 %
NONHDLC SERPL-MCNC: 115 MG/DL
PLATELET # BLD AUTO: 234 K/UL
POTASSIUM SERPL-SCNC: 4.3 MMOL/L
PROT SERPL-MCNC: 7.3 G/DL
RBC # BLD: 4.4 M/UL
RBC # FLD: 13.6 %
SODIUM SERPL-SCNC: 136 MMOL/L
TRIGL SERPL-MCNC: 69 MG/DL
WBC # FLD AUTO: 7.88 K/UL

## 2023-12-21 NOTE — HISTORY OF PRESENT ILLNESS
[de-identified] : CC: NASAL CONGESTION AND COUGH FOR ABOUT THREE WEEKS.  Patient has a history of chronic nasal congestion.  Right hip pain for the last 6 weeks. Worse with movement.  Had COVID about three weeks ago. Recent COVID test was negative.

## 2023-12-21 NOTE — PHYSICAL EXAM
[Normal] : no carotid or abdominal bruits heard, no varicosities, pedal pulses are present, no peripheral edema, no extremity clubbing or cyanosis and no palpable aorta [de-identified] : Pain elicited with internal nd external rotation of the hip.

## 2023-12-21 NOTE — ASSESSMENT
[FreeTextEntry1] : Chronic Sinusitis: Try Z-pac Flonase 2 sprays in each nostril daily x 10 d. To see Dr. Zana Niño CBC, CMP, Lipid Profile  Osteoarthritis of the Right Hip: Told to see Dr. MARI Sainz.  Hypertension under good control.

## 2023-12-29 DIAGNOSIS — B02.9 ZOSTER W/OUT COMPLICATIONS: ICD-10-CM

## 2024-01-01 NOTE — ASSESSMENT
[FreeTextEntry1] : This is a 78 year -old  M who was examined today for an annual preventative physical.  A complete history and physical examination were performed.  The patient seems to follow a healthy lifestyle in that he  follows a good diet and exercises regularly.  \par \par Physical examination was entirely within normal limits , including a normal blood pressure and pulse.  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  I do not believe this represents an early manifestation of Parkinson's Disease.\par \par Elevated PSA.: Repeat PSA\par \par Abnormal Weight Loss:\par  2024 16:08

## 2024-01-03 ENCOUNTER — APPOINTMENT (OUTPATIENT)
Dept: ORTHOPEDIC SURGERY | Facility: CLINIC | Age: 84
End: 2024-01-03
Payer: MEDICARE

## 2024-01-03 VITALS
HEART RATE: 54 BPM | HEIGHT: 70 IN | BODY MASS INDEX: 21.19 KG/M2 | DIASTOLIC BLOOD PRESSURE: 58 MMHG | SYSTOLIC BLOOD PRESSURE: 116 MMHG | WEIGHT: 148 LBS

## 2024-01-03 PROCEDURE — 73502 X-RAY EXAM HIP UNI 2-3 VIEWS: CPT

## 2024-01-03 PROCEDURE — 99214 OFFICE O/P EST MOD 30 MIN: CPT

## 2024-01-03 NOTE — DISCUSSION/SUMMARY
[de-identified] :  This patient has right hip osteoarthritis. I had a discussion with the patient, who is a candidate for joint replacement but he would like to defer at this point and will consider using meloxicam.  He was concerned if the meloxicam interacts with his glaucoma and he will further research this with his primary care doctor and ophthalmologist. Also suggested a cortisone injection and he will check with his eye doc about this. Risks, benefits and alternatives were discussed. At this point, the patient wants to hold off. An extensive discussion was conducted on the natural history of the disease and the variety of surgical and non-surgical options available to the patient including, but not limited to non-steroidal anti-inflammatory medications, steroid injections, physical therapy, maintenance of ideal body weight, and reduction of activity. The patient will schedule an appointment as needed.

## 2024-01-03 NOTE — PHYSICAL EXAM
[de-identified] : Well developed, well nourished in no apparent distress, awake, alert and orientated to person, place and time. Respirations are even and unlabored. Gait evaluation reveals a limp. There is no inguinal adenopathy. Examination of the contralateral hip shows normal range of motion, strength, no tenderness, and intact skin. The affected limb is well-perfused, with palpable pedal pulses and no skin lesions Sensorimotor is grossly intact. Hip motion is slightly reduced and does cause some pain. FADIR is positive and HANNA is positive.  Leg lengths are approximately equal Both hips are stable and muscle strength is normal. [de-identified] : AP and lateral x-rays of the right hip, pelvis, and femur were ordered and taken in the office and demonstrate degenerative joint disease of the hip with joint space narrowing, osteophyte formation, and subchondral sclerosis.

## 2024-01-03 NOTE — HISTORY OF PRESENT ILLNESS
[de-identified] : This is a very nice  83 year old  male experiencing  pain in the right hip which is mild-moderate in intensity and has been going on for at least 3 months now.  The pain somewhat limits activities of daily living. Walking tolerance is slightly reduced. The patient denies any radiation of the pain to the feet and it is not associated with numbness, tingling, or weakness.

## 2024-01-15 ENCOUNTER — RX RENEWAL (OUTPATIENT)
Age: 84
End: 2024-01-15

## 2024-01-18 ENCOUNTER — APPOINTMENT (OUTPATIENT)
Dept: OTOLARYNGOLOGY | Facility: CLINIC | Age: 84
End: 2024-01-18
Payer: MEDICARE

## 2024-01-18 ENCOUNTER — NON-APPOINTMENT (OUTPATIENT)
Age: 84
End: 2024-01-18

## 2024-01-18 VITALS
HEART RATE: 54 BPM | BODY MASS INDEX: 21.19 KG/M2 | TEMPERATURE: 97.8 F | WEIGHT: 148 LBS | DIASTOLIC BLOOD PRESSURE: 56 MMHG | SYSTOLIC BLOOD PRESSURE: 116 MMHG | HEIGHT: 70 IN

## 2024-01-18 DIAGNOSIS — H90.3 SENSORINEURAL HEARING LOSS, BILATERAL: ICD-10-CM

## 2024-01-18 PROCEDURE — 31231 NASAL ENDOSCOPY DX: CPT

## 2024-01-18 PROCEDURE — 99214 OFFICE O/P EST MOD 30 MIN: CPT | Mod: 25

## 2024-01-18 RX ORDER — FLUTICASONE PROPIONATE 50 UG/1
50 SPRAY, METERED NASAL DAILY
Qty: 1 | Refills: 3 | Status: ACTIVE | COMMUNITY
Start: 2024-01-18 | End: 1900-01-01

## 2024-01-18 NOTE — HISTORY OF PRESENT ILLNESS
[de-identified] : Patient comes in with recurrent sinus issues. He feels that he gets pressure recurrently under the eyes. He does have pressure that comes and goes around the eyes. He has a lot of mucus, thick. He has not recently been on antibiotics. He currently uses Flonase. He feels that dairy contributes to his allergy issues, He is at the tail end of a shingles infection and is at the end of a course of valtrex  He wears hearing aids bilaterally

## 2024-01-18 NOTE — HISTORY OF PRESENT ILLNESS
[de-identified] : Patient comes in with recurrent sinus issues. He feels that he gets pressure recurrently under the eyes. He does have pressure that comes and goes around the eyes. He has a lot of mucus, thick. He has not recently been on antibiotics. He currently uses Flonase. He feels that dairy contributes to his allergy issues, He is at the tail end of a shingles infection and is at the end of a course of valtrex  He wears hearing aids bilaterally

## 2024-01-18 NOTE — REVIEW OF SYSTEMS
[Negative] : Heme/Lymph [Hearing Loss] : hearing loss [As Noted in HPI] : as noted in HPI [Recurrent Sinus Infections] : recurrent sinus infections [Sinus Pressure] : sinus pressure

## 2024-01-18 NOTE — ASSESSMENT
[FreeTextEntry1] : Patient with symptoms that he feels is related to recurrent sinus infections never been treated with any antibiotics gets facial pressure behind the eyes.  Seems to be triggered by dairy products and alcohol he has been seen by an allergist has no dairy allergies endoscopically a lot of thick mucus put him on Augmentin Flonase nasal spray told him to use it religiously for 1 month reevaluate him in a month if there is no improvement we will get send him for a CAT scan of his paranasal sinuses at that time.  He is currently finishing a course of treatment for shingles.

## 2024-01-18 NOTE — CONSULT LETTER
[Dear  ___] : Dear  [unfilled], [Consult Letter:] : I had the pleasure of evaluating your patient, [unfilled]. [Please see my note below.] : Please see my note below. [Consult Closing:] : Thank you very much for allowing me to participate in the care of this patient.  If you have any questions, please do not hesitate to contact me. [Sincerely,] : Sincerely, [FreeTextEntry3] : Zana Niño MD [facility] Associated Professor, Chrissie

## 2024-01-18 NOTE — CONSULT LETTER
[Dear  ___] : Dear  [unfilled], [Consult Letter:] : I had the pleasure of evaluating your patient, [unfilled]. [Please see my note below.] : Please see my note below. [Consult Closing:] : Thank you very much for allowing me to participate in the care of this patient.  If you have any questions, please do not hesitate to contact me. [Sincerely,] : Sincerely, [FreeTextEntry3] : Zana Niño MD [facility] Associated Professor, Chrissie

## 2024-01-18 NOTE — END OF VISIT
[FreeTextEntry3] : I, Dr. Niño personally performed the evaluation and management (E/M) services including all necessary procedures, for this new patient. That E/M includes conducting the clinically appropriate initial history &/or exam, assessing all conditions, and establishing the plan of care. Today, my ANDRY, Betty Pham, was here to observe &/or participate in the visit & follow plan of care established by me.

## 2024-01-19 ENCOUNTER — APPOINTMENT (OUTPATIENT)
Dept: ORTHOPEDIC SURGERY | Facility: CLINIC | Age: 84
End: 2024-01-19
Payer: MEDICARE

## 2024-01-19 VITALS — WEIGHT: 148 LBS | HEIGHT: 70 IN | BODY MASS INDEX: 21.19 KG/M2

## 2024-01-19 DIAGNOSIS — M16.11 UNILATERAL PRIMARY OSTEOARTHRITIS, RIGHT HIP: ICD-10-CM

## 2024-01-19 PROCEDURE — 99215 OFFICE O/P EST HI 40 MIN: CPT

## 2024-01-19 NOTE — HISTORY OF PRESENT ILLNESS
[de-identified] : This is a very nice  83 year old  male experiencing  pain in the right hip which is mild-moderate in intensity and has been going on for at least 4 months now.  The pain limits activities of daily living. Walking tolerance is reduced. The patient denies any radiation of the pain to the feet and it is not associated with numbness, tingling, or weakness.

## 2024-01-19 NOTE — PHYSICAL EXAM
[de-identified] : Well developed, well nourished in no apparent distress, awake, alert and orientated to person, place and time. Respirations are even and unlabored. Gait evaluation reveals a limp. There is no inguinal adenopathy. Examination of the contralateral hip shows normal range of motion, strength, no tenderness, and intact skin. The affected limb is well-perfused, with palpable pedal pulses and no skin lesions Sensorimotor is grossly intact. Hip motion is slightly reduced and does cause some pain. FADIR is positive and HANNA is positive.  Leg lengths are approximately equal  Both hips are stable and muscle strength is normal. [de-identified] : AP and lateral x-rays of the right hip, pelvis, and femur were reviewed from the prior visit and demonstrate degenerative joint disease of the hip with joint space narrowing, osteophyte formation, and subchondral sclerosis.

## 2024-01-19 NOTE — DISCUSSION/SUMMARY
[de-identified] :  This patient has right hip osteoarthritis that is severe. He has failed a course of conservative management and would like to proceed with a direct anterior approach right total hip arthroplasty.  The patient is an appropriate candidate for consideration of right total hip replacement. An extensive discussion was conducted of the natural history of the disease and the variety of surgical and non-surgical treatment options available to the patient. A risk/benefit analysis was discussed with the patient reviewing the advantages and disadvantages of surgical intervention at this time. Both the level and length of the patient's pain have made additional conservative treatment measures consisting of NSAIDs, physical therapy, and/or corticosteroids contraindicated. A full explanation was given of the nature and the purpose of the procedure and anesthesia, its benefits, possible alternative methods of diagnosis or treatment, the risks involved, the possibility of complications, the foreseeable consequences of the procedure and the possible results of the non-treatment. I reviewed the plan of care as well as used a model of a total hip implant equivalent to the one that will be used for their total hip joint replacement. The ability to secure the implant utilizing cement or cementless (press-fit) was discussed with the patient. The patient agrees with the plan of care, as well as the use of implants for their total hip replacement.   No guarantee or assurance was made as to the results that may be obtained. Specifically, the risks were identified to include, but are not limited to the following: Infection, phlebitis, pulmonary embolism, death, paralysis, dislocation, pain, stiffness, instability, limp, weakness, breakage, leg-length inequality, uncontrolled bleeding, nerve injury, blood vessel injury, pressure sores, anesthetic risks, delayed healing of wound and bone, and wear and loosening. Further discussion was undertaken with the patient about the details of surgical preparation, treatment, and postoperative rehabilitation including medical clearance, autotransfusion, the hospital course, and the postoperative rehabilitation involved. All in all, I feel that this patient is a good candidate for surgical reconstruction.  The patient and I discussed the option for 23 hour stay joint replacement. They will stay overnight in the hospital after surgery and will discharge home on the next day of surgery. The risks and benefits of this type of rapid recovery protocol was reviewed with the patient and they are in agreement with proceeding with 23 hour stay joint replacement surgery. They understand that in the event of an emergency, that they will be transferred to the main hospital for inpatient care.

## 2024-01-26 ENCOUNTER — NON-APPOINTMENT (OUTPATIENT)
Age: 84
End: 2024-01-26

## 2024-01-26 ASSESSMENT — HOOS JR
LYING IN BED (TURNING OVER, MAINTAINING HIP POSITION): MODERATE
BENDING TO THE FLOOR TO PICK UP OBJECT: SEVERE
HOOS JR RAW SCORE: 13
GOING UP OR DOWN STAIRS: MODERATE
RISING FROM SITTING: SEVERE
WALKING ON UNEVEN SURFACE: MODERATE
SITTING: MILD

## 2024-01-30 DIAGNOSIS — Z00.00 ENCOUNTER FOR GENERAL ADULT MEDICAL EXAMINATION W/OUT ABNORMAL FINDINGS: ICD-10-CM

## 2024-02-15 ENCOUNTER — APPOINTMENT (OUTPATIENT)
Dept: OTOLARYNGOLOGY | Facility: CLINIC | Age: 84
End: 2024-02-15
Payer: MEDICARE

## 2024-02-15 VITALS
DIASTOLIC BLOOD PRESSURE: 67 MMHG | SYSTOLIC BLOOD PRESSURE: 143 MMHG | WEIGHT: 148 LBS | HEART RATE: 50 BPM | BODY MASS INDEX: 25.27 KG/M2 | HEIGHT: 64 IN

## 2024-02-15 DIAGNOSIS — J32.9 CHRONIC SINUSITIS, UNSPECIFIED: ICD-10-CM

## 2024-02-15 DIAGNOSIS — R09.81 NASAL CONGESTION: ICD-10-CM

## 2024-02-15 PROCEDURE — 31231 NASAL ENDOSCOPY DX: CPT

## 2024-02-15 PROCEDURE — 99214 OFFICE O/P EST MOD 30 MIN: CPT | Mod: 25

## 2024-02-15 NOTE — END OF VISIT
[FreeTextEntry3] : I, Dr. Niño personally performed the evaluation and management (E/M) services , including all procedures, for this established patient who presents today with (a) new problem(s)/exacerbation of (an) existing condition(s). That E/M includes conducting the clinically appropriate interval history &/or exam, assessing all new/exacerbated conditions, and establishing a new plan of care. Today, my ANDRY, Betty Pham, was here to observe &/or participate in the visit & follow plan of care established by me.

## 2024-02-15 NOTE — HISTORY OF PRESENT ILLNESS
[de-identified] : Patient comes in with recurrent sinus issues. He feels that he gets pressure recurrently under the eyes. He does have pressure that comes and goes around the eyes. He has a lot of mucus, thick. He has not recently been on antibiotics. He currently uses Flonase. He feels that dairy contributes to his allergy issues, He is at the tail end of a shingles infection and is at the end of a course of valtrex  He wears hearing aids bilaterally  [FreeTextEntry1] : Patient feels that his sinuses are open but he has pressure behind the yes and heaviness as well. He feels his vision is even off lately as well, admits it may be eye strain in addition to a lot of pressure in the eyebrow area  HE feels he is breathing better since the last visit overall and does not feel an acute sinusitis right now . He is still using the Flonase daily and has been taking his antibiotic as prescribed.  He still has a gargle in his voice as if there is mucus and he feels this is a sign that his sinuses are acting up.  again he did have some dairy and wine recently that he still feels triggers

## 2024-02-15 NOTE — REVIEW OF SYSTEMS
[Hearing Loss] : hearing loss [As Noted in HPI] : as noted in HPI [Recurrent Sinus Infections] : recurrent sinus infections [Sinus Pressure] : sinus pressure [Negative] : Heme/Lymph

## 2024-02-15 NOTE — ASSESSMENT
[FreeTextEntry1] : Patient completed the antibiotics after the visit last month and he has been doing Flonase. He still reports nasal congestion although possibly improvement in nasal breathing. He states the pressure is constant and behind his eyes, making them feel very heavy. Due to the chronicity of his complaints we sent him for a CT to definitively evaluate his sinuses. On endoscopy today there was no purulence or polyposis noted but his DNS of course still present. He should continue FLonase use daily and we will call with CT results

## 2024-02-17 ENCOUNTER — OUTPATIENT (OUTPATIENT)
Dept: OUTPATIENT SERVICES | Facility: HOSPITAL | Age: 84
LOS: 1 days | End: 2024-02-17
Payer: MEDICARE

## 2024-02-17 ENCOUNTER — APPOINTMENT (OUTPATIENT)
Dept: CT IMAGING | Facility: CLINIC | Age: 84
End: 2024-02-17
Payer: MEDICARE

## 2024-02-17 DIAGNOSIS — J32.9 CHRONIC SINUSITIS, UNSPECIFIED: ICD-10-CM

## 2024-02-17 PROCEDURE — 70486 CT MAXILLOFACIAL W/O DYE: CPT | Mod: 26,MH

## 2024-02-17 PROCEDURE — 70486 CT MAXILLOFACIAL W/O DYE: CPT

## 2024-02-20 ENCOUNTER — NON-APPOINTMENT (OUTPATIENT)
Age: 84
End: 2024-02-20

## 2024-02-20 ENCOUNTER — APPOINTMENT (OUTPATIENT)
Dept: INTERNAL MEDICINE | Facility: CLINIC | Age: 84
End: 2024-02-20
Payer: MEDICARE

## 2024-02-20 VITALS
BODY MASS INDEX: 22.23 KG/M2 | WEIGHT: 145 LBS | OXYGEN SATURATION: 99 % | HEART RATE: 59 BPM | HEIGHT: 67.8 IN | DIASTOLIC BLOOD PRESSURE: 66 MMHG | SYSTOLIC BLOOD PRESSURE: 143 MMHG

## 2024-02-20 DIAGNOSIS — M16.11 UNILATERAL PRIMARY OSTEOARTHRITIS, RIGHT HIP: ICD-10-CM

## 2024-02-20 DIAGNOSIS — K57.30 DIVERTICULOSIS OF LARGE INTESTINE W/OUT PERFORATION OR ABSCESS W/OUT BLEEDING: ICD-10-CM

## 2024-02-20 PROCEDURE — 36415 COLL VENOUS BLD VENIPUNCTURE: CPT

## 2024-02-20 PROCEDURE — 99214 OFFICE O/P EST MOD 30 MIN: CPT

## 2024-02-20 PROCEDURE — G2211 COMPLEX E/M VISIT ADD ON: CPT

## 2024-02-20 RX ORDER — NIRMATRELVIR AND RITONAVIR 300-100 MG
20 X 150 MG & KIT ORAL
Qty: 30 | Refills: 0 | Status: DISCONTINUED | COMMUNITY
Start: 2023-11-20 | End: 2024-02-20

## 2024-02-20 RX ORDER — GABAPENTIN 100 MG/1
100 CAPSULE ORAL
Qty: 50 | Refills: 1 | Status: DISCONTINUED | COMMUNITY
Start: 2023-12-29 | End: 2024-02-20

## 2024-02-20 RX ORDER — AZITHROMYCIN 250 MG/1
250 TABLET, FILM COATED ORAL
Qty: 1 | Refills: 0 | Status: DISCONTINUED | COMMUNITY
Start: 2023-12-19 | End: 2024-02-20

## 2024-02-20 RX ORDER — AMOXICILLIN AND CLAVULANATE POTASSIUM 875; 125 MG/1; MG/1
875-125 TABLET, COATED ORAL
Qty: 14 | Refills: 1 | Status: DISCONTINUED | COMMUNITY
Start: 2024-01-18 | End: 2024-02-20

## 2024-02-20 RX ORDER — FLUTICASONE PROPIONATE 50 UG/1
50 SPRAY, METERED NASAL
Qty: 48 | Refills: 2 | Status: DISCONTINUED | COMMUNITY
Start: 2023-12-19 | End: 2024-02-20

## 2024-02-20 NOTE — PHYSICAL EXAM
[No Acute Distress] : no acute distress [Well Nourished] : well nourished [No JVD] : no jugular venous distention [No Lymphadenopathy] : no lymphadenopathy [No Respiratory Distress] : no respiratory distress  [No Accessory Muscle Use] : no accessory muscle use [Normal Rate] : normal rate  [Regular Rhythm] : with a regular rhythm [No Carotid Bruits] : no carotid bruits

## 2024-02-21 ENCOUNTER — NON-APPOINTMENT (OUTPATIENT)
Age: 84
End: 2024-02-21

## 2024-02-21 ENCOUNTER — OUTPATIENT (OUTPATIENT)
Dept: OUTPATIENT SERVICES | Facility: HOSPITAL | Age: 84
LOS: 1 days | End: 2024-02-21
Payer: MEDICARE

## 2024-02-21 VITALS
DIASTOLIC BLOOD PRESSURE: 71 MMHG | RESPIRATION RATE: 18 BRPM | SYSTOLIC BLOOD PRESSURE: 139 MMHG | OXYGEN SATURATION: 98 % | HEART RATE: 52 BPM | WEIGHT: 147.05 LBS | HEIGHT: 69 IN | TEMPERATURE: 98 F

## 2024-02-21 DIAGNOSIS — Z90.49 ACQUIRED ABSENCE OF OTHER SPECIFIED PARTS OF DIGESTIVE TRACT: Chronic | ICD-10-CM

## 2024-02-21 DIAGNOSIS — M16.11 UNILATERAL PRIMARY OSTEOARTHRITIS, RIGHT HIP: ICD-10-CM

## 2024-02-21 DIAGNOSIS — Z90.89 ACQUIRED ABSENCE OF OTHER ORGANS: Chronic | ICD-10-CM

## 2024-02-21 DIAGNOSIS — Z01.818 ENCOUNTER FOR OTHER PREPROCEDURAL EXAMINATION: ICD-10-CM

## 2024-02-21 LAB
ALBUMIN SERPL ELPH-MCNC: 4.2 G/DL
ALP BLD-CCNC: 110 U/L
ALT SERPL-CCNC: 26 U/L
ANION GAP SERPL CALC-SCNC: 10 MMOL/L
AST SERPL-CCNC: 23 U/L
BASOPHILS # BLD AUTO: 0.04 K/UL
BASOPHILS NFR BLD AUTO: 0.6 %
BILIRUB SERPL-MCNC: 0.3 MG/DL
BUN SERPL-MCNC: 27 MG/DL
CALCIUM SERPL-MCNC: 10.1 MG/DL
CHLORIDE SERPL-SCNC: 103 MMOL/L
CHOLEST SERPL-MCNC: 183 MG/DL
CO2 SERPL-SCNC: 27 MMOL/L
CREAT SERPL-MCNC: 1.2 MG/DL
EGFR: 60 ML/MIN/1.73M2
EOSINOPHIL # BLD AUTO: 0.2 K/UL
EOSINOPHIL NFR BLD AUTO: 3.1 %
ESTIMATED AVERAGE GLUCOSE: 103 MG/DL
GLUCOSE SERPL-MCNC: 100 MG/DL
HBA1C MFR BLD HPLC: 5.2 %
HCT VFR BLD CALC: 45.8 %
HDLC SERPL-MCNC: 58 MG/DL
HGB BLD-MCNC: 14.8 G/DL
IMM GRANULOCYTES NFR BLD AUTO: 0.3 %
LDLC SERPL CALC-MCNC: 108 MG/DL
LYMPHOCYTES # BLD AUTO: 1.29 K/UL
LYMPHOCYTES NFR BLD AUTO: 19.9 %
MAN DIFF?: NORMAL
MCHC RBC-ENTMCNC: 32.3 GM/DL
MCHC RBC-ENTMCNC: 33.2 PG
MCV RBC AUTO: 102.7 FL
MONOCYTES # BLD AUTO: 0.64 K/UL
MONOCYTES NFR BLD AUTO: 9.9 %
NEUTROPHILS # BLD AUTO: 4.29 K/UL
NEUTROPHILS NFR BLD AUTO: 66.2 %
NONHDLC SERPL-MCNC: 125 MG/DL
PLATELET # BLD AUTO: 210 K/UL
POTASSIUM SERPL-SCNC: 4.4 MMOL/L
PROT SERPL-MCNC: 7 G/DL
RBC # BLD: 4.46 M/UL
RBC # FLD: 14.9 %
SODIUM SERPL-SCNC: 140 MMOL/L
TRIGL SERPL-MCNC: 92 MG/DL
TSH SERPL-ACNC: 3.57 UIU/ML
URATE SERPL-MCNC: 6.7 MG/DL
WBC # FLD AUTO: 6.48 K/UL

## 2024-02-21 PROCEDURE — G0463: CPT

## 2024-02-21 PROCEDURE — 87641 MR-STAPH DNA AMP PROBE: CPT

## 2024-02-21 PROCEDURE — 86900 BLOOD TYPING SEROLOGIC ABO: CPT

## 2024-02-21 PROCEDURE — 87640 STAPH A DNA AMP PROBE: CPT

## 2024-02-21 PROCEDURE — 86901 BLOOD TYPING SEROLOGIC RH(D): CPT

## 2024-02-21 PROCEDURE — 86850 RBC ANTIBODY SCREEN: CPT

## 2024-02-21 RX ORDER — SODIUM CHLORIDE 9 MG/ML
1000 INJECTION, SOLUTION INTRAVENOUS
Refills: 0 | Status: DISCONTINUED | OUTPATIENT
Start: 2024-03-11 | End: 2024-03-25

## 2024-02-21 RX ORDER — MECLIZINE HCL 12.5 MG
1 TABLET ORAL
Qty: 0 | Refills: 0 | DISCHARGE

## 2024-02-21 RX ORDER — LIDOCAINE HCL 20 MG/ML
0.2 VIAL (ML) INJECTION ONCE
Refills: 0 | Status: DISCONTINUED | OUTPATIENT
Start: 2024-03-11 | End: 2024-03-25

## 2024-02-21 RX ORDER — CEFAZOLIN SODIUM 1 G
2000 VIAL (EA) INJECTION ONCE
Refills: 0 | Status: COMPLETED | OUTPATIENT
Start: 2024-03-11 | End: 2024-03-11

## 2024-02-21 NOTE — H&P PST ADULT - NSICDXPASTMEDICALHX_GEN_ALL_CORE_FT
PAST MEDICAL HISTORY:  GERD (gastroesophageal reflux disease)     History of BPH     History of osteoarthritis     Mild HTN     PND (post-nasal drip)     Shingles

## 2024-02-21 NOTE — ASSESSMENT
[Patient Optimized for Surgery] : Patient optimized for surgery [ECG] : ECG [Continue medications as is] : Continue current medications [FreeTextEntry4] : This patient is medically optimized for surgery.  His blood pressure is well controlled.  He is planning to see his Cardiologist prior to surgery. The CT of Sinuses report is not back at this time but needs to be reviewed prior to surgery.  The patient will speak to Dr. Niño about it. I have spent a total of 35 minutes on the day of the visit both face to face and non-face to face with the patient.

## 2024-02-21 NOTE — H&P PST ADULT - NSICDXPASTSURGICALHX_GEN_ALL_CORE_FT
PAST SURGICAL HISTORY:  History of appendectomy     History of partial colectomy     History of tonsillectomy

## 2024-02-21 NOTE — H&P PST ADULT - HISTORY OF PRESENT ILLNESS
83yr old male with osteoarthritis right hip. Pt states he has been getting pain and muscle spasms limiting his range of motion since 9/2023. Now coming in for right total hip arthroplasty direct anterior approach on 3/11/24. Hx of HTN in control, BPH colon  resection 2/2023.

## 2024-02-21 NOTE — REVIEW OF SYSTEMS
[Fever] : no fever [Chills] : no chills [Chest Pain] : no chest pain [Palpitations] : no palpitations [Orthopena] : no orthopnea [Shortness Of Breath] : no shortness of breath [Wheezing] : no wheezing [Abdominal Pain] : no abdominal pain [Nausea] : no nausea [Headache] : no headache

## 2024-02-21 NOTE — HISTORY OF PRESENT ILLNESS
[No Pertinent Cardiac History] : no history of aortic stenosis, atrial fibrillation, coronary artery disease, recent myocardial infarction, or implantable device/pacemaker [No Adverse Anesthesia Reaction] : no adverse anesthesia reaction in self or family member [Asthma] : no asthma [COPD] : no COPD [Sleep Apnea] : no sleep apnea [Smoker] : not a smoker [FreeTextEntry1] : Right Hip Replacement [FreeTextEntry2] : 3/11/2024 [FreeTextEntry3] : Dr. MELISSA Sainz [FreeTextEntry4] : The patient has had severe pain in the Right hip for about 5 months hip. He has been feeling well.  His most recent surgery was in September of 2023 for Acute Diverticulitius.  [FreeTextEntry6] : None [FreeTextEntry7] : He will see Dr. Zamudio on Friday.

## 2024-02-23 ENCOUNTER — APPOINTMENT (OUTPATIENT)
Dept: CARDIOLOGY | Facility: CLINIC | Age: 84
End: 2024-02-23
Payer: MEDICARE

## 2024-02-23 ENCOUNTER — NON-APPOINTMENT (OUTPATIENT)
Age: 84
End: 2024-02-23

## 2024-02-23 VITALS
DIASTOLIC BLOOD PRESSURE: 66 MMHG | WEIGHT: 148 LBS | TEMPERATURE: 98 F | HEIGHT: 67 IN | BODY MASS INDEX: 23.23 KG/M2 | OXYGEN SATURATION: 98 % | HEART RATE: 51 BPM | SYSTOLIC BLOOD PRESSURE: 130 MMHG

## 2024-02-23 DIAGNOSIS — R91.8 OTHER NONSPECIFIC ABNORMAL FINDING OF LUNG FIELD: ICD-10-CM

## 2024-02-23 DIAGNOSIS — Z01.818 ENCOUNTER FOR OTHER PREPROCEDURAL EXAMINATION: ICD-10-CM

## 2024-02-23 DIAGNOSIS — M25.551 PAIN IN RIGHT HIP: ICD-10-CM

## 2024-02-23 DIAGNOSIS — I10 ESSENTIAL (PRIMARY) HYPERTENSION: ICD-10-CM

## 2024-02-23 DIAGNOSIS — I77.810 THORACIC AORTIC ECTASIA: ICD-10-CM

## 2024-02-23 DIAGNOSIS — R01.1 CARDIAC MURMUR, UNSPECIFIED: ICD-10-CM

## 2024-02-23 DIAGNOSIS — E78.00 PURE HYPERCHOLESTEROLEMIA, UNSPECIFIED: ICD-10-CM

## 2024-02-23 PROBLEM — B02.9 ZOSTER WITHOUT COMPLICATIONS: Chronic | Status: ACTIVE | Noted: 2024-02-21

## 2024-02-23 PROBLEM — R09.82 POSTNASAL DRIP: Chronic | Status: ACTIVE | Noted: 2024-02-21

## 2024-02-23 PROBLEM — Z87.438 PERSONAL HISTORY OF OTHER DISEASES OF MALE GENITAL ORGANS: Chronic | Status: ACTIVE | Noted: 2024-02-21

## 2024-02-23 PROBLEM — Z87.39 PERSONAL HISTORY OF OTHER DISEASES OF THE MUSCULOSKELETAL SYSTEM AND CONNECTIVE TISSUE: Chronic | Status: ACTIVE | Noted: 2024-02-21

## 2024-02-23 PROBLEM — K21.9 GASTRO-ESOPHAGEAL REFLUX DISEASE WITHOUT ESOPHAGITIS: Chronic | Status: ACTIVE | Noted: 2024-02-21

## 2024-02-23 PROCEDURE — 99214 OFFICE O/P EST MOD 30 MIN: CPT

## 2024-02-23 PROCEDURE — 93000 ELECTROCARDIOGRAM COMPLETE: CPT | Mod: NC

## 2024-02-23 PROCEDURE — 93306 TTE W/DOPPLER COMPLETE: CPT

## 2024-02-23 NOTE — HISTORY OF PRESENT ILLNESS
[Preoperative Visit] : for a medical evaluation prior to surgery [Scheduled Procedure ___] : a [unfilled] [Date of Surgery ___] : on [unfilled] [Surgeon Name ___] : surgeon: [unfilled] [Good] : Good [Prior Anesthesia] : Prior anesthesia [Fever] : no fever [Fatigue] : no fatigue [Chills] : no chills [Chest Pain] : no chest pain [Cough] : no cough [Dyspnea] : no dyspnea [Dysuria] : no dysuria [Urinary Frequency] : no urinary frequency [Nausea] : no nausea [Vomiting] : no vomiting [Diarrhea] : no diarrhea [Easy Bruising] : no easy bruising [Abdominal Pain] : no abdominal pain [Poor Exercise Tolerance] : no poor exercise tolerance [Lower Extremity Swelling] : no lower extremity swelling [Diabetes] : no diabetes [Cardiovascular Disease] : no cardiovascular disease [Pulmonary Disease] : no pulmonary disease [Anti-Platelet Agents] : no anti-platelet agents [Nicotine Dependence] : no nicotine dependence [Alcohol Use] : no  alcohol use [Renal Disease] : no renal disease [GI Disease] : no gastrointestinal disease [Sleep Apnea] : no sleep apnea [Thromboembolic Problems] : no thromboembolic problems [Clotting Disorder] : no clotting disorder [Bleeding Disorder] : no bleeding disorder [Frequent use of NSAIDs] : no use of NSAIDs [Transfusion Reaction] : no transfusion reaction [Impaired Immunity] : no impaired immunity [Frequent Aspirin Use] : no frequent aspirin use [Steroid Use in Last 6 Months] : no steroid use in the last six months [Prev Anesthesia Reaction] : no previous anesthesia reaction [FreeTextEntry1] : This is an 82 y/o male with a pmhx of HTN, dilated aorta here today for a pre op for right hip replacement on 3/11/24 with Dr. Sainz.  Patient denies chest pain, dyspnea, palpitations, dizziness, syncope, changes in bowel/bladder habits or appetite.

## 2024-02-23 NOTE — PHYSICAL EXAM
[General Appearance - Well Developed] : well developed [Normal Appearance] : normal appearance [General Appearance - Well Nourished] : well nourished [Well Groomed] : well groomed [No Deformities] : no deformities [General Appearance - In No Acute Distress] : no acute distress [Normal Conjunctiva] : the conjunctiva exhibited no abnormalities [Eyelids - No Xanthelasma] : the eyelids demonstrated no xanthelasmas [Normal Oral Mucosa] : normal oral mucosa [No Oral Pallor] : no oral pallor [Normal Jugular Venous A Waves Present] : normal jugular venous A waves present [No Oral Cyanosis] : no oral cyanosis [Normal Jugular Venous V Waves Present] : normal jugular venous V waves present [No Jugular Venous Jones A Waves] : no jugular venous jones A waves [Respiration, Rhythm And Depth] : normal respiratory rhythm and effort [Exaggerated Use Of Accessory Muscles For Inspiration] : no accessory muscle use [Auscultation Breath Sounds / Voice Sounds] : lungs were clear to auscultation bilaterally [Heart Rate And Rhythm] : heart rate and rhythm were normal [Heart Sounds] : normal S1 and S2 [Abdomen Soft] : soft [Abdomen Tenderness] : non-tender [Abdomen Mass (___ Cm)] : no abdominal mass palpated [Abnormal Walk] : normal gait [Nail Clubbing] : no clubbing of the fingernails [Gait - Sufficient For Exercise Testing] : the gait was sufficient for exercise testing [Cyanosis, Localized] : no localized cyanosis [Petechial Hemorrhages (___cm)] : no petechial hemorrhages [] : no rash [Skin Color & Pigmentation] : normal skin color and pigmentation [No Venous Stasis] : no venous stasis [Skin Lesions] : no skin lesions [No Skin Ulcers] : no skin ulcer [No Xanthoma] : no  xanthoma was observed [Oriented To Time, Place, And Person] : oriented to person, place, and time [Affect] : the affect was normal [Mood] : the mood was normal [No Anxiety] : not feeling anxious [FreeTextEntry1] : 1/6 sytolic murmur apex

## 2024-03-07 RX ORDER — TRAMADOL HYDROCHLORIDE 50 MG/1
50 TABLET, COATED ORAL
Qty: 28 | Refills: 0 | Status: ACTIVE | COMMUNITY
Start: 2024-03-07 | End: 1900-01-01

## 2024-03-07 RX ORDER — NAPROXEN 500 MG/1
500 TABLET ORAL
Qty: 60 | Refills: 2 | Status: ACTIVE | COMMUNITY
Start: 2024-03-07 | End: 1900-01-01

## 2024-03-07 RX ORDER — ASPIRIN ENTERIC COATED TABLETS 81 MG 81 MG/1
81 TABLET, DELAYED RELEASE ORAL
Qty: 60 | Refills: 0 | Status: ACTIVE | COMMUNITY
Start: 2024-03-07 | End: 1900-01-01

## 2024-03-07 RX ORDER — OXYCODONE 5 MG/1
5 TABLET ORAL
Qty: 28 | Refills: 0 | Status: ACTIVE | COMMUNITY
Start: 2024-03-07 | End: 1900-01-01

## 2024-03-07 RX ORDER — PANTOPRAZOLE 40 MG/1
40 TABLET, DELAYED RELEASE ORAL DAILY
Qty: 30 | Refills: 0 | Status: ACTIVE | COMMUNITY
Start: 2024-03-07 | End: 1900-01-01

## 2024-03-10 ENCOUNTER — TRANSCRIPTION ENCOUNTER (OUTPATIENT)
Age: 84
End: 2024-03-10

## 2024-03-11 ENCOUNTER — OUTPATIENT (OUTPATIENT)
Dept: OUTPATIENT SERVICES | Facility: HOSPITAL | Age: 84
LOS: 1 days | End: 2024-03-11
Payer: MEDICARE

## 2024-03-11 ENCOUNTER — RESULT REVIEW (OUTPATIENT)
Age: 84
End: 2024-03-11

## 2024-03-11 ENCOUNTER — TRANSCRIPTION ENCOUNTER (OUTPATIENT)
Age: 84
End: 2024-03-11

## 2024-03-11 ENCOUNTER — APPOINTMENT (OUTPATIENT)
Dept: ORTHOPEDIC SURGERY | Facility: HOSPITAL | Age: 84
End: 2024-03-11

## 2024-03-11 VITALS
WEIGHT: 147.05 LBS | DIASTOLIC BLOOD PRESSURE: 58 MMHG | RESPIRATION RATE: 16 BRPM | SYSTOLIC BLOOD PRESSURE: 143 MMHG | HEART RATE: 63 BPM | OXYGEN SATURATION: 98 % | TEMPERATURE: 98 F | HEIGHT: 69 IN

## 2024-03-11 DIAGNOSIS — Z90.49 ACQUIRED ABSENCE OF OTHER SPECIFIED PARTS OF DIGESTIVE TRACT: Chronic | ICD-10-CM

## 2024-03-11 DIAGNOSIS — M16.11 UNILATERAL PRIMARY OSTEOARTHRITIS, RIGHT HIP: ICD-10-CM

## 2024-03-11 DIAGNOSIS — Z90.89 ACQUIRED ABSENCE OF OTHER ORGANS: Chronic | ICD-10-CM

## 2024-03-11 PROCEDURE — 27130 TOTAL HIP ARTHROPLASTY: CPT | Mod: RT

## 2024-03-11 PROCEDURE — 72170 X-RAY EXAM OF PELVIS: CPT | Mod: 26

## 2024-03-11 DEVICE — HEAD FEM CERAMIC V40 36MM OD -2.5MM: Type: IMPLANTABLE DEVICE | Site: RIGHT | Status: FUNCTIONAL

## 2024-03-11 DEVICE — LINER ACET TRIDENT X3 0 DEG 36MM E: Type: IMPLANTABLE DEVICE | Site: RIGHT | Status: FUNCTIONAL

## 2024-03-11 DEVICE — STEM ACC V40 127 DEG SZ 3 30X102MM 127 DEG: Type: IMPLANTABLE DEVICE | Site: RIGHT | Status: FUNCTIONAL

## 2024-03-11 DEVICE — SHELL ACET MULTIHOLE TRIDENT II E 52MM: Type: IMPLANTABLE DEVICE | Site: RIGHT | Status: FUNCTIONAL

## 2024-03-11 RX ORDER — ONDANSETRON 8 MG/1
4 TABLET, FILM COATED ORAL EVERY 6 HOURS
Refills: 0 | Status: DISCONTINUED | OUTPATIENT
Start: 2024-03-11 | End: 2024-03-25

## 2024-03-11 RX ORDER — PANTOPRAZOLE SODIUM 20 MG/1
40 TABLET, DELAYED RELEASE ORAL ONCE
Refills: 0 | Status: COMPLETED | OUTPATIENT
Start: 2024-03-11 | End: 2024-03-11

## 2024-03-11 RX ORDER — CHLORHEXIDINE GLUCONATE 213 G/1000ML
1 SOLUTION TOPICAL ONCE
Refills: 0 | Status: COMPLETED | OUTPATIENT
Start: 2024-03-11 | End: 2024-03-11

## 2024-03-11 RX ORDER — FLUTICASONE PROPIONATE 50 MCG
12 SPRAY, SUSPENSION NASAL
Refills: 0 | DISCHARGE

## 2024-03-11 RX ORDER — SENNA PLUS 8.6 MG/1
2 TABLET ORAL AT BEDTIME
Refills: 0 | Status: DISCONTINUED | OUTPATIENT
Start: 2024-03-11 | End: 2024-03-25

## 2024-03-11 RX ORDER — ACETAMINOPHEN 500 MG
1000 TABLET ORAL ONCE
Refills: 0 | Status: COMPLETED | OUTPATIENT
Start: 2024-03-11 | End: 2024-03-11

## 2024-03-11 RX ORDER — POLYETHYLENE GLYCOL 3350 17 G/17G
17 POWDER, FOR SOLUTION ORAL AT BEDTIME
Refills: 0 | Status: DISCONTINUED | OUTPATIENT
Start: 2024-03-11 | End: 2024-03-25

## 2024-03-11 RX ORDER — LOSARTAN POTASSIUM 100 MG/1
1 TABLET, FILM COATED ORAL
Qty: 0 | Refills: 0 | DISCHARGE

## 2024-03-11 RX ORDER — KETOROLAC TROMETHAMINE 30 MG/ML
15 SYRINGE (ML) INJECTION EVERY 6 HOURS
Refills: 0 | Status: COMPLETED | OUTPATIENT
Start: 2024-03-11 | End: 2024-03-12

## 2024-03-11 RX ORDER — SODIUM CHLORIDE 9 MG/ML
500 INJECTION INTRAMUSCULAR; INTRAVENOUS; SUBCUTANEOUS ONCE
Refills: 0 | Status: COMPLETED | OUTPATIENT
Start: 2024-03-11 | End: 2024-03-11

## 2024-03-11 RX ORDER — FLUTICASONE PROPIONATE 50 MCG
1 SPRAY, SUSPENSION NASAL
Refills: 0 | Status: DISCONTINUED | OUTPATIENT
Start: 2024-03-11 | End: 2024-03-25

## 2024-03-11 RX ORDER — AMLODIPINE BESYLATE 2.5 MG/1
2.5 TABLET ORAL DAILY
Refills: 0 | Status: DISCONTINUED | OUTPATIENT
Start: 2024-03-11 | End: 2024-03-25

## 2024-03-11 RX ORDER — AMLODIPINE BESYLATE 2.5 MG/1
3 TABLET ORAL
Refills: 0 | DISCHARGE

## 2024-03-11 RX ORDER — MAGNESIUM HYDROXIDE 400 MG/1
30 TABLET, CHEWABLE ORAL DAILY
Refills: 0 | Status: DISCONTINUED | OUTPATIENT
Start: 2024-03-11 | End: 2024-03-25

## 2024-03-11 RX ORDER — CEFAZOLIN SODIUM 1 G
2000 VIAL (EA) INJECTION EVERY 8 HOURS
Refills: 0 | Status: COMPLETED | OUTPATIENT
Start: 2024-03-11 | End: 2024-03-12

## 2024-03-11 RX ORDER — FINASTERIDE 5 MG/1
1 TABLET, FILM COATED ORAL
Qty: 0 | Refills: 0 | DISCHARGE

## 2024-03-11 RX ORDER — SODIUM CHLORIDE 9 MG/ML
500 INJECTION INTRAMUSCULAR; INTRAVENOUS; SUBCUTANEOUS ONCE
Refills: 0 | Status: COMPLETED | OUTPATIENT
Start: 2024-03-11 | End: 2024-03-12

## 2024-03-11 RX ORDER — OMEPRAZOLE 10 MG/1
1 CAPSULE, DELAYED RELEASE ORAL
Qty: 0 | Refills: 0 | DISCHARGE

## 2024-03-11 RX ORDER — PANTOPRAZOLE SODIUM 20 MG/1
40 TABLET, DELAYED RELEASE ORAL
Refills: 0 | Status: DISCONTINUED | OUTPATIENT
Start: 2024-03-11 | End: 2024-03-25

## 2024-03-11 RX ORDER — TRAMADOL HYDROCHLORIDE 50 MG/1
50 TABLET ORAL ONCE
Refills: 0 | Status: DISCONTINUED | OUTPATIENT
Start: 2024-03-11 | End: 2024-03-11

## 2024-03-11 RX ORDER — LOSARTAN POTASSIUM 100 MG/1
25 TABLET, FILM COATED ORAL DAILY
Refills: 0 | Status: DISCONTINUED | OUTPATIENT
Start: 2024-03-11 | End: 2024-03-25

## 2024-03-11 RX ORDER — ACETAMINOPHEN 500 MG
1000 TABLET ORAL ONCE
Refills: 0 | Status: COMPLETED | OUTPATIENT
Start: 2024-03-12 | End: 2024-03-12

## 2024-03-11 RX ORDER — TRAMADOL HYDROCHLORIDE 50 MG/1
50 TABLET ORAL EVERY 6 HOURS
Refills: 0 | Status: DISCONTINUED | OUTPATIENT
Start: 2024-03-11 | End: 2024-03-11

## 2024-03-11 RX ORDER — FINASTERIDE 5 MG/1
5 TABLET, FILM COATED ORAL DAILY
Refills: 0 | Status: DISCONTINUED | OUTPATIENT
Start: 2024-03-11 | End: 2024-03-25

## 2024-03-11 RX ORDER — LATANOPROST 0.05 MG/ML
1 SOLUTION/ DROPS OPHTHALMIC; TOPICAL AT BEDTIME
Refills: 0 | Status: DISCONTINUED | OUTPATIENT
Start: 2024-03-11 | End: 2024-03-25

## 2024-03-11 RX ORDER — BIMATOPROST 0.3 MG/ML
1 SOLUTION/ DROPS OPHTHALMIC
Refills: 0 | DISCHARGE

## 2024-03-11 RX ORDER — ASPIRIN/CALCIUM CARB/MAGNESIUM 324 MG
81 TABLET ORAL
Refills: 0 | Status: DISCONTINUED | OUTPATIENT
Start: 2024-03-11 | End: 2024-03-25

## 2024-03-11 RX ORDER — NETARSUDIL 0.2 MG/ML
1 SOLUTION/ DROPS OPHTHALMIC; TOPICAL
Refills: 0 | DISCHARGE

## 2024-03-11 RX ORDER — TAMSULOSIN HYDROCHLORIDE 0.4 MG/1
0.4 CAPSULE ORAL AT BEDTIME
Refills: 0 | Status: DISCONTINUED | OUTPATIENT
Start: 2024-03-11 | End: 2024-03-25

## 2024-03-11 RX ADMIN — SENNA PLUS 2 TABLET(S): 8.6 TABLET ORAL at 21:22

## 2024-03-11 RX ADMIN — Medication 400 MILLIGRAM(S): at 21:22

## 2024-03-11 RX ADMIN — Medication 1000 MILLIGRAM(S): at 21:22

## 2024-03-11 RX ADMIN — Medication 81 MILLIGRAM(S): at 19:01

## 2024-03-11 RX ADMIN — SODIUM CHLORIDE 100 MILLILITER(S): 9 INJECTION, SOLUTION INTRAVENOUS at 11:45

## 2024-03-11 RX ADMIN — TRAMADOL HYDROCHLORIDE 50 MILLIGRAM(S): 50 TABLET ORAL at 19:41

## 2024-03-11 RX ADMIN — CHLORHEXIDINE GLUCONATE 1 APPLICATION(S): 213 SOLUTION TOPICAL at 11:46

## 2024-03-11 RX ADMIN — Medication 100 MILLIGRAM(S): at 21:28

## 2024-03-11 RX ADMIN — TRAMADOL HYDROCHLORIDE 50 MILLIGRAM(S): 50 TABLET ORAL at 20:00

## 2024-03-11 RX ADMIN — Medication 1 SPRAY(S): at 19:01

## 2024-03-11 RX ADMIN — PANTOPRAZOLE SODIUM 40 MILLIGRAM(S): 20 TABLET, DELAYED RELEASE ORAL at 11:47

## 2024-03-11 RX ADMIN — Medication 100 MILLIGRAM(S): at 14:05

## 2024-03-11 RX ADMIN — SODIUM CHLORIDE 500 MILLILITER(S): 9 INJECTION INTRAMUSCULAR; INTRAVENOUS; SUBCUTANEOUS at 19:34

## 2024-03-11 RX ADMIN — Medication 15 MILLIGRAM(S): at 21:22

## 2024-03-11 RX ADMIN — SODIUM CHLORIDE 100 MILLILITER(S): 9 INJECTION, SOLUTION INTRAVENOUS at 13:53

## 2024-03-11 RX ADMIN — SODIUM CHLORIDE 500 MILLILITER(S): 9 INJECTION INTRAMUSCULAR; INTRAVENOUS; SUBCUTANEOUS at 16:05

## 2024-03-11 RX ADMIN — TAMSULOSIN HYDROCHLORIDE 0.4 MILLIGRAM(S): 0.4 CAPSULE ORAL at 21:22

## 2024-03-11 RX ADMIN — TRAMADOL HYDROCHLORIDE 50 MILLIGRAM(S): 50 TABLET ORAL at 13:48

## 2024-03-11 RX ADMIN — LATANOPROST 1 DROP(S): 0.05 SOLUTION/ DROPS OPHTHALMIC; TOPICAL at 21:21

## 2024-03-11 NOTE — ASU DISCHARGE PLAN (ADULT/PEDIATRIC) - ASU DC SPECIAL INSTRUCTIONSFT
1.	Pain Control - take medications as prescribed  2.	Walking with full weight bearing as tolerated, with assistive devices (walker/Cane as Needed)  3.	DVT Prophylaxis- Aspirin 81mg oral twice daily for 30 days  4.	Keep Dressing Clean and dry. Do not remove until post operative day #10.   5.	Follow up with Dr. Sainz as Outpatient in 30 Days after Discharge from the Hospital. Call Office For Appointment.   6.	Follow exercise program given to you by Dr. Sainz.   7.	Ice affected area as Needed 1.	Pain Control - take medications as prescribed    ********************************************************************************************   2.	Walking with full weight bearing as tolerated, with assistive devices (walker/Cane as Needed)    ********************************************************************************************   3.	DVT Prophylaxis- Aspirin 81mg oral twice daily for 30 days    ********************************************************************************************   4.	Keep Dressing Clean and dry. Do not remove until post operative day #10.     ********************************************************************************************   5.	Follow up with Dr. Sainz as Outpatient in 30 Days after Discharge from the Hospital. Call Office For Appointment.     ********************************************************************************************   6.	Follow exercise program given to you by Dr. Sainz.     ********************************************************************************************   7.	Ice affected area as Needed

## 2024-03-11 NOTE — PACU DISCHARGE NOTE - COMMENTS
No anesthesia complications.  Pt. meets anesthesia discharge criteria. Staying overnight on 23-hour stay program

## 2024-03-11 NOTE — OCCUPATIONAL THERAPY INITIAL EVALUATION ADULT - PERTINENT HX OF CURRENT PROBLEM, REHAB EVAL
83yr old male with osteoarthritis right hip. Pt states he has been getting pain and muscle spasms limiting his range of motion since 9/2023. Now coming in for right total hip arthroplasty direct anterior approach on 3/11/24.

## 2024-03-11 NOTE — CHART NOTE - NSCHARTNOTEFT_GEN_A_CORE
Patient seen in PACU resting without complaints.  No Chest Pain, SOB, N/V.    T(C): 36 (03-11-24 @ 15:45), Max: 36.5 (03-11-24 @ 11:25)  HR: 72 (03-11-24 @ 18:45) (47 - 72)  BP: 130/54 (03-11-24 @ 18:45) (107/43 - 143/58)  RR: 15 (03-11-24 @ 18:45) (14 - 16)  SpO2: 99% (03-11-24 @ 18:45) (98% - 100%)  Wt(kg): --    Exam:  Alert and Oriented, No Acute Distress  Laterality: R hip aquacel in place C/D/I  Calves soft, non-tender bilaterally  +PF/DF/EHL/FHL  SILT  + DP pulse        A/P: 83yMale S/p R MARTA . VSS. NAD  -PT/OT-WBAT RLE, PT cleared  -IS encouraged  -DVT PPx with A81 BID  -Pain Control  -23 hr stay    Franci Moore PA-C  Team Pager #[phone]

## 2024-03-11 NOTE — ASU DISCHARGE PLAN (ADULT/PEDIATRIC) - CARE PROVIDER_API CALL
Lewis Sainz  Orthopaedic Surgery  [address], [address]  [place] [address]  Phone: [phone]  Fax: [phone]  Follow Up Time:

## 2024-03-11 NOTE — PHYSICAL THERAPY INITIAL EVALUATION ADULT - ADDITIONAL COMMENTS
Pt resides in a pvt home w/ spouse, 3 steps to enter (+HR), one flight of stairs to negotiate inside. PTA pt was independent with all mobility & ADL's. Did not use an AD for ambulation, owns RW.

## 2024-03-11 NOTE — ASU PATIENT PROFILE, ADULT - NSICDXPASTSURGICALHX_GEN_ALL_CORE_FT
PAST SURGICAL HISTORY:  History of appendectomy     History of partial colectomy     History of tonsillectomy     
Attending Only

## 2024-03-11 NOTE — ASU DISCHARGE PLAN (ADULT/PEDIATRIC) - NS MD DC FALL RISK RISK
For information on Fall & Injury Prevention, visit: [email or web address] OR  [email or web address] OR  https://www.cdc.gov/steadi/patient.html

## 2024-03-11 NOTE — ASU PATIENT PROFILE, ADULT - FALL HARM RISK - UNIVERSAL INTERVENTIONS
Bed in lowest position, wheels locked, appropriate side rails in place/Call bell, personal items and telephone in reach/Instruct patient to call for assistance before getting out of bed or chair/Non-slip footwear when patient is out of bed/[place] to call system/Physically safe environment - no spills, clutter or unnecessary equipment/Purposeful Proactive Rounding/Room/bathroom lighting operational, light cord in reach

## 2024-03-11 NOTE — PHYSICAL THERAPY INITIAL EVALUATION ADULT - PERTINENT HX OF CURRENT PROBLEM, REHAB EVAL
83 y.o. M PMH HTN in control, BPH colon resection 2/2023, osteoarthritis right hip. Pt states he has been getting pain and muscle spasms limiting his range of motion since 9/2023. Now s/p R total hip replacement, anterior approach, on 3/11/24.

## 2024-03-12 VITALS
OXYGEN SATURATION: 100 % | HEART RATE: 67 BPM | DIASTOLIC BLOOD PRESSURE: 61 MMHG | SYSTOLIC BLOOD PRESSURE: 133 MMHG | TEMPERATURE: 98 F | RESPIRATION RATE: 17 BRPM

## 2024-03-12 PROCEDURE — 27130 TOTAL HIP ARTHROPLASTY: CPT | Mod: RT

## 2024-03-12 PROCEDURE — C1776: CPT

## 2024-03-12 PROCEDURE — 82962 GLUCOSE BLOOD TEST: CPT

## 2024-03-12 PROCEDURE — 97530 THERAPEUTIC ACTIVITIES: CPT

## 2024-03-12 PROCEDURE — 97116 GAIT TRAINING THERAPY: CPT

## 2024-03-12 PROCEDURE — 94640 AIRWAY INHALATION TREATMENT: CPT

## 2024-03-12 PROCEDURE — 97165 OT EVAL LOW COMPLEX 30 MIN: CPT

## 2024-03-12 PROCEDURE — 97161 PT EVAL LOW COMPLEX 20 MIN: CPT

## 2024-03-12 PROCEDURE — 72170 X-RAY EXAM OF PELVIS: CPT

## 2024-03-12 RX ADMIN — Medication 15 MILLIGRAM(S): at 03:07

## 2024-03-12 RX ADMIN — Medication 1000 MILLIGRAM(S): at 06:24

## 2024-03-12 RX ADMIN — Medication 1 SPRAY(S): at 06:22

## 2024-03-12 RX ADMIN — PANTOPRAZOLE SODIUM 40 MILLIGRAM(S): 20 TABLET, DELAYED RELEASE ORAL at 06:22

## 2024-03-12 RX ADMIN — Medication 81 MILLIGRAM(S): at 06:22

## 2024-03-12 RX ADMIN — Medication 400 MILLIGRAM(S): at 06:22

## 2024-03-12 RX ADMIN — SODIUM CHLORIDE 500 MILLILITER(S): 9 INJECTION INTRAMUSCULAR; INTRAVENOUS; SUBCUTANEOUS at 06:23

## 2024-03-12 RX ADMIN — Medication 100 MILLIGRAM(S): at 06:23

## 2024-03-14 ENCOUNTER — NON-APPOINTMENT (OUTPATIENT)
Age: 84
End: 2024-03-14

## 2024-03-25 ENCOUNTER — RX RENEWAL (OUTPATIENT)
Age: 84
End: 2024-03-25

## 2024-03-28 ENCOUNTER — APPOINTMENT (OUTPATIENT)
Dept: ORTHOPEDIC SURGERY | Facility: CLINIC | Age: 84
End: 2024-03-28
Payer: MEDICARE

## 2024-03-28 VITALS — BODY MASS INDEX: 23.23 KG/M2 | WEIGHT: 148 LBS | HEIGHT: 67 IN

## 2024-03-28 PROCEDURE — 99024 POSTOP FOLLOW-UP VISIT: CPT

## 2024-03-28 PROCEDURE — 73502 X-RAY EXAM HIP UNI 2-3 VIEWS: CPT | Mod: 26,RT

## 2024-03-28 NOTE — HISTORY OF PRESENT ILLNESS
[de-identified] : Status-post right total hip  arthroplasty here for initial postoperative evaluation. Excellent progress is noted in terms of pain and restoration of function. Pain is well controlled with oral medications. There has been no change in medical health since discharge. The patient does require assistive devices.

## 2024-03-28 NOTE — DISCUSSION/SUMMARY
[de-identified] : The patient is doing well after joint replacement surgery. Written infectious precautions were reviewed. The patient will progress with physical therapy at this time and they will work on transitioning from requiring assistive devices for ambulation. Anti-coagulant therapy will be discontinued at 1 month post surgery for the purpose of orthopedic thromboembolism prophylaxis. Return around the 6 week anniversary from surgery for follow-up evaluation.

## 2024-03-28 NOTE — PHYSICAL EXAM
[de-identified] : Well developed, well nourished in no apparent distress, awake, alert and orientated to person, place and time with appropriate mood and affect Respirations are even and unlabored. Gait evaluation does not reveal a limp. There is no inguinal adenopathy. The affected limb is well-perfused with palpable pedal pulse, without skin lesions, shows a grossly normal motor and sensory examination. Incision is CDI Hip motion is full and painless throughout ROM. Leg lengths are approximately equal  [de-identified] : AP pelvis, AP hip, and lateral x-rays of the right hip were ordered and obtained in the office and demonstrate satisfactory position and alignment of the components are present. No signs of loosening are seen.

## 2024-04-15 ASSESSMENT — HOOS JR
GOING UP OR DOWN STAIRS: MILD
BENDING TO THE FLOOR TO PICK UP OBJECT: MODERATE
HOOS JR RAW SCORE: 12
LYING IN BED (TURNING OVER, MAINTAINING HIP POSITION): MODERATE
WALKING ON UNEVEN SURFACE: MODERATE
SITTING: MODERATE
RISING FROM SITTING: SEVERE

## 2024-04-18 ENCOUNTER — NON-APPOINTMENT (OUTPATIENT)
Age: 84
End: 2024-04-18

## 2024-05-01 ENCOUNTER — APPOINTMENT (OUTPATIENT)
Dept: ORTHOPEDIC SURGERY | Facility: CLINIC | Age: 84
End: 2024-05-01
Payer: MEDICARE

## 2024-05-01 VITALS — HEIGHT: 67 IN | BODY MASS INDEX: 23.23 KG/M2 | WEIGHT: 148 LBS

## 2024-05-01 DIAGNOSIS — Z96.641 PRESENCE OF RIGHT ARTIFICIAL HIP JOINT: ICD-10-CM

## 2024-05-01 PROCEDURE — 99024 POSTOP FOLLOW-UP VISIT: CPT

## 2024-05-01 NOTE — HISTORY OF PRESENT ILLNESS
[de-identified] : Status-post right total hip  arthroplasty here for routine  postoperative evaluation. Excellent progress is noted in terms of pain and restoration of function. Pain is well controlled with oral medications. There has been no change in medical health since discharge. The patient does not require assistive devices.

## 2024-05-01 NOTE — PHYSICAL EXAM
[de-identified] : Well developed, well nourished in no apparent distress, awake, alert and orientated to person, place and time with appropriate mood and affect Respirations are even and unlabored. Gait evaluation does not reveal a limp. There is no inguinal adenopathy. The affected limb is well-perfused with palpable pedal pulse, without skin lesions, shows a grossly normal motor and sensory examination. Incision is CDI Hip motion is full and painless throughout ROM. Leg lengths are approximately equal

## 2024-05-01 NOTE — DISCUSSION/SUMMARY
[de-identified] : The patient is doing well after joint replacement surgery. WBAT. Return around the 6 month anniversary from surgery for follow-up evaluation.

## 2024-06-01 ENCOUNTER — NON-APPOINTMENT (OUTPATIENT)
Age: 84
End: 2024-06-01

## 2024-06-01 ASSESSMENT — HOOS JR
LYING IN BED (TURNING OVER, MAINTAINING HIP POSITION): MILD
GOING UP OR DOWN STAIRS: MODERATE
RISING FROM SITTING: SEVERE
WALKING ON UNEVEN SURFACE: MILD
BENDING TO THE FLOOR TO PICK UP OBJECT: MODERATE
HOOS JR RAW SCORE: 9

## 2024-06-17 RX ORDER — LOSARTAN POTASSIUM 25 MG/1
25 TABLET, FILM COATED ORAL DAILY
Qty: 90 | Refills: 3 | Status: ACTIVE | COMMUNITY
Start: 2021-09-29 | End: 1900-01-01

## 2024-06-17 RX ORDER — AMLODIPINE BESYLATE 2.5 MG/1
2.5 TABLET ORAL
Qty: 270 | Refills: 3 | Status: ACTIVE | COMMUNITY
Start: 2021-04-13 | End: 1900-01-01

## 2024-06-30 NOTE — ED ADULT NURSE NOTE - NS ED NOTE ABUSE RESPONSE YN
-Hemoglobin 8.4g/dL on 6/30/24  -LDH normal indicating no hemolysis  -Haptoglobin pending  -B12 and folate normal  -Ferritin elevated indicating anemia of chronic disease   -Transfuse for hemoglobin less than 7g/dL   -Will monitor   Yes

## 2024-07-16 ENCOUNTER — APPOINTMENT (OUTPATIENT)
Dept: INTERNAL MEDICINE | Facility: CLINIC | Age: 84
End: 2024-07-16
Payer: MEDICARE

## 2024-07-16 VITALS
HEIGHT: 67.8 IN | WEIGHT: 152 LBS | SYSTOLIC BLOOD PRESSURE: 134 MMHG | DIASTOLIC BLOOD PRESSURE: 70 MMHG | BODY MASS INDEX: 23.31 KG/M2 | TEMPERATURE: 97.9 F | OXYGEN SATURATION: 98 % | HEART RATE: 54 BPM

## 2024-07-16 DIAGNOSIS — N39.0 URINARY TRACT INFECTION, SITE NOT SPECIFIED: ICD-10-CM

## 2024-07-16 DIAGNOSIS — N30.00 ACUTE CYSTITIS W/OUT HEMATURIA: ICD-10-CM

## 2024-07-16 LAB
ALBUMIN SERPL ELPH-MCNC: 4.3 G/DL
ALP BLD-CCNC: 158 U/L
ALT SERPL-CCNC: 113 U/L
ANION GAP SERPL CALC-SCNC: 13 MMOL/L
AST SERPL-CCNC: 88 U/L
BASOPHILS # BLD AUTO: 0.02 K/UL
BASOPHILS NFR BLD AUTO: 0.2 %
BILIRUB SERPL-MCNC: 0.7 MG/DL
BILIRUB UR QL STRIP: NEGATIVE
BUN SERPL-MCNC: 16 MG/DL
CALCIUM SERPL-MCNC: 9.5 MG/DL
CHLORIDE SERPL-SCNC: 95 MMOL/L
CLARITY UR: CLEAR
CO2 SERPL-SCNC: 22 MMOL/L
COLLECTION METHOD: NORMAL
CREAT SERPL-MCNC: 1.09 MG/DL
EGFR: 67 ML/MIN/1.73M2
EOSINOPHIL # BLD AUTO: 0.11 K/UL
EOSINOPHIL NFR BLD AUTO: 0.9 %
GLUCOSE SERPL-MCNC: 99 MG/DL
GLUCOSE UR-MCNC: NEGATIVE
HCG UR QL: 0.2 EU/DL
HCT VFR BLD CALC: 41 %
HGB BLD-MCNC: 14.2 G/DL
HGB UR QL STRIP.AUTO: NORMAL
IMM GRANULOCYTES NFR BLD AUTO: 0.6 %
KETONES UR-MCNC: NEGATIVE
LEUKOCYTE ESTERASE UR QL STRIP: NORMAL
LYMPHOCYTES # BLD AUTO: 1.17 K/UL
LYMPHOCYTES NFR BLD AUTO: 9.2 %
MAN DIFF?: NORMAL
MCHC RBC-ENTMCNC: 32.2 PG
MCHC RBC-ENTMCNC: 34.6 GM/DL
MCV RBC AUTO: 93 FL
MONOCYTES # BLD AUTO: 0.97 K/UL
MONOCYTES NFR BLD AUTO: 7.6 %
NEUTROPHILS # BLD AUTO: 10.42 K/UL
NEUTROPHILS NFR BLD AUTO: 81.5 %
NITRITE UR QL STRIP: NEGATIVE
PH UR STRIP: 6.5
PLATELET # BLD AUTO: 186 K/UL
POTASSIUM SERPL-SCNC: 4.3 MMOL/L
PROT SERPL-MCNC: 7.4 G/DL
PROT UR STRIP-MCNC: 100
RBC # BLD: 4.41 M/UL
RBC # FLD: 13.2 %
SODIUM SERPL-SCNC: 131 MMOL/L
SP GR UR STRIP: 1.02
WBC # FLD AUTO: 12.77 K/UL

## 2024-07-16 PROCEDURE — 81003 URINALYSIS AUTO W/O SCOPE: CPT | Mod: QW

## 2024-07-16 PROCEDURE — G2211 COMPLEX E/M VISIT ADD ON: CPT

## 2024-07-16 PROCEDURE — 36415 COLL VENOUS BLD VENIPUNCTURE: CPT

## 2024-07-16 PROCEDURE — 99214 OFFICE O/P EST MOD 30 MIN: CPT

## 2024-07-16 RX ORDER — AMOXICILLIN AND CLAVULANATE POTASSIUM 875; 125 MG/1; MG/1
875-125 TABLET, COATED ORAL TWICE DAILY
Qty: 14 | Refills: 0 | Status: ACTIVE | COMMUNITY
Start: 2024-07-16 | End: 1900-01-01

## 2024-07-16 RX ORDER — TAMSULOSIN HYDROCHLORIDE 0.4 MG/1
0.4 CAPSULE ORAL
Refills: 0 | Status: ACTIVE | COMMUNITY

## 2024-07-17 ENCOUNTER — NON-APPOINTMENT (OUTPATIENT)
Age: 84
End: 2024-07-17

## 2024-07-19 LAB — BACTERIA UR CULT: ABNORMAL

## 2024-07-21 ENCOUNTER — NON-APPOINTMENT (OUTPATIENT)
Age: 84
End: 2024-07-21

## 2024-07-21 ENCOUNTER — EMERGENCY (EMERGENCY)
Facility: HOSPITAL | Age: 84
LOS: 1 days | Discharge: HOME | End: 2024-07-21
Attending: STUDENT IN AN ORGANIZED HEALTH CARE EDUCATION/TRAINING PROGRAM
Payer: MEDICARE

## 2024-07-21 VITALS
HEART RATE: 55 BPM | RESPIRATION RATE: 17 BRPM | DIASTOLIC BLOOD PRESSURE: 59 MMHG | SYSTOLIC BLOOD PRESSURE: 139 MMHG | WEIGHT: 151.9 LBS | TEMPERATURE: 98 F | HEIGHT: 67 IN | OXYGEN SATURATION: 98 %

## 2024-07-21 VITALS
TEMPERATURE: 98 F | DIASTOLIC BLOOD PRESSURE: 68 MMHG | RESPIRATION RATE: 18 BRPM | HEART RATE: 57 BPM | SYSTOLIC BLOOD PRESSURE: 148 MMHG | OXYGEN SATURATION: 96 %

## 2024-07-21 DIAGNOSIS — Z90.49 ACQUIRED ABSENCE OF OTHER SPECIFIED PARTS OF DIGESTIVE TRACT: Chronic | ICD-10-CM

## 2024-07-21 DIAGNOSIS — Z90.89 ACQUIRED ABSENCE OF OTHER ORGANS: Chronic | ICD-10-CM

## 2024-07-21 LAB
ADD ON TEST-SPECIMEN IN LAB: SIGNIFICANT CHANGE UP
ADD ON TEST-SPECIMEN IN LAB: SIGNIFICANT CHANGE UP
ALBUMIN SERPL ELPH-MCNC: 4 G/DL — SIGNIFICANT CHANGE UP (ref 3.3–5)
ALP SERPL-CCNC: 260 U/L — HIGH (ref 40–120)
ALT FLD-CCNC: 124 U/L — HIGH (ref 10–45)
ANION GAP SERPL CALC-SCNC: 15 MMOL/L — SIGNIFICANT CHANGE UP (ref 5–17)
APPEARANCE UR: CLEAR — SIGNIFICANT CHANGE UP
APTT BLD: 29.7 SEC — SIGNIFICANT CHANGE UP (ref 24.5–35.6)
AST SERPL-CCNC: 52 U/L — HIGH (ref 10–40)
BACTERIA # UR AUTO: NEGATIVE /HPF — SIGNIFICANT CHANGE UP
BASOPHILS # BLD AUTO: 0.05 K/UL — SIGNIFICANT CHANGE UP (ref 0–0.2)
BASOPHILS NFR BLD AUTO: 0.5 % — SIGNIFICANT CHANGE UP (ref 0–2)
BILIRUB SERPL-MCNC: 0.3 MG/DL — SIGNIFICANT CHANGE UP (ref 0.2–1.2)
BILIRUB UR-MCNC: NEGATIVE — SIGNIFICANT CHANGE UP
BUN SERPL-MCNC: 15 MG/DL — SIGNIFICANT CHANGE UP (ref 7–23)
CALCIUM SERPL-MCNC: 10.1 MG/DL — SIGNIFICANT CHANGE UP (ref 8.4–10.5)
CAST: 0 /LPF — SIGNIFICANT CHANGE UP (ref 0–4)
CHLORIDE SERPL-SCNC: 98 MMOL/L — SIGNIFICANT CHANGE UP (ref 96–108)
CO2 SERPL-SCNC: 22 MMOL/L — SIGNIFICANT CHANGE UP (ref 22–31)
COLOR SPEC: YELLOW — SIGNIFICANT CHANGE UP
CREAT SERPL-MCNC: 1.01 MG/DL — SIGNIFICANT CHANGE UP (ref 0.5–1.3)
DIFF PNL FLD: NEGATIVE — SIGNIFICANT CHANGE UP
EGFR: 73 ML/MIN/1.73M2 — SIGNIFICANT CHANGE UP
EOSINOPHIL # BLD AUTO: 0.16 K/UL — SIGNIFICANT CHANGE UP (ref 0–0.5)
EOSINOPHIL NFR BLD AUTO: 1.5 % — SIGNIFICANT CHANGE UP (ref 0–6)
GLUCOSE SERPL-MCNC: 114 MG/DL — HIGH (ref 70–99)
GLUCOSE UR QL: NEGATIVE MG/DL — SIGNIFICANT CHANGE UP
HCT VFR BLD CALC: 41.8 % — SIGNIFICANT CHANGE UP (ref 39–50)
HGB BLD-MCNC: 13.9 G/DL — SIGNIFICANT CHANGE UP (ref 13–17)
IMM GRANULOCYTES NFR BLD AUTO: 0.5 % — SIGNIFICANT CHANGE UP (ref 0–0.9)
INR BLD: 0.93 RATIO — SIGNIFICANT CHANGE UP (ref 0.85–1.18)
KETONES UR-MCNC: NEGATIVE MG/DL — SIGNIFICANT CHANGE UP
LEUKOCYTE ESTERASE UR-ACNC: ABNORMAL
LIDOCAIN IGE QN: 33 U/L — SIGNIFICANT CHANGE UP (ref 7–60)
LYMPHOCYTES # BLD AUTO: 1.36 K/UL — SIGNIFICANT CHANGE UP (ref 1–3.3)
LYMPHOCYTES # BLD AUTO: 12.8 % — LOW (ref 13–44)
MCHC RBC-ENTMCNC: 32.1 PG — SIGNIFICANT CHANGE UP (ref 27–34)
MCHC RBC-ENTMCNC: 33.3 GM/DL — SIGNIFICANT CHANGE UP (ref 32–36)
MCV RBC AUTO: 96.5 FL — SIGNIFICANT CHANGE UP (ref 80–100)
MONOCYTES # BLD AUTO: 0.94 K/UL — HIGH (ref 0–0.9)
MONOCYTES NFR BLD AUTO: 8.9 % — SIGNIFICANT CHANGE UP (ref 2–14)
NEUTROPHILS # BLD AUTO: 8.03 K/UL — HIGH (ref 1.8–7.4)
NEUTROPHILS NFR BLD AUTO: 75.8 % — SIGNIFICANT CHANGE UP (ref 43–77)
NITRITE UR-MCNC: NEGATIVE — SIGNIFICANT CHANGE UP
NRBC # BLD: 0 /100 WBCS — SIGNIFICANT CHANGE UP (ref 0–0)
PH UR: 7 — SIGNIFICANT CHANGE UP (ref 5–8)
PLATELET # BLD AUTO: 246 K/UL — SIGNIFICANT CHANGE UP (ref 150–400)
POTASSIUM SERPL-MCNC: 4 MMOL/L — SIGNIFICANT CHANGE UP (ref 3.5–5.3)
POTASSIUM SERPL-SCNC: 4 MMOL/L — SIGNIFICANT CHANGE UP (ref 3.5–5.3)
PROT SERPL-MCNC: 7.6 G/DL — SIGNIFICANT CHANGE UP (ref 6–8.3)
PROT UR-MCNC: SIGNIFICANT CHANGE UP MG/DL
PROTHROM AB SERPL-ACNC: 10.3 SEC — SIGNIFICANT CHANGE UP (ref 9.5–13)
RBC # BLD: 4.33 M/UL — SIGNIFICANT CHANGE UP (ref 4.2–5.8)
RBC # FLD: 13.1 % — SIGNIFICANT CHANGE UP (ref 10.3–14.5)
RBC CASTS # UR COMP ASSIST: 5 /HPF — HIGH (ref 0–4)
REVIEW: SIGNIFICANT CHANGE UP
SODIUM SERPL-SCNC: 135 MMOL/L — SIGNIFICANT CHANGE UP (ref 135–145)
SP GR SPEC: 1.02 — SIGNIFICANT CHANGE UP (ref 1–1.03)
SQUAMOUS # UR AUTO: 0 /HPF — SIGNIFICANT CHANGE UP (ref 0–5)
UROBILINOGEN FLD QL: 1 MG/DL — SIGNIFICANT CHANGE UP (ref 0.2–1)
WBC # BLD: 10.59 K/UL — HIGH (ref 3.8–10.5)
WBC # FLD AUTO: 10.59 K/UL — HIGH (ref 3.8–10.5)
WBC UR QL: 27 /HPF — HIGH (ref 0–5)

## 2024-07-21 PROCEDURE — 87086 URINE CULTURE/COLONY COUNT: CPT

## 2024-07-21 PROCEDURE — 80074 ACUTE HEPATITIS PANEL: CPT

## 2024-07-21 PROCEDURE — 80053 COMPREHEN METABOLIC PANEL: CPT

## 2024-07-21 PROCEDURE — 96365 THER/PROPH/DIAG IV INF INIT: CPT | Mod: XU

## 2024-07-21 PROCEDURE — 85025 COMPLETE CBC W/AUTO DIFF WBC: CPT

## 2024-07-21 PROCEDURE — 85730 THROMBOPLASTIN TIME PARTIAL: CPT

## 2024-07-21 PROCEDURE — 99285 EMERGENCY DEPT VISIT HI MDM: CPT

## 2024-07-21 PROCEDURE — 82248 BILIRUBIN DIRECT: CPT

## 2024-07-21 PROCEDURE — 74177 CT ABD & PELVIS W/CONTRAST: CPT | Mod: MC

## 2024-07-21 PROCEDURE — 85610 PROTHROMBIN TIME: CPT

## 2024-07-21 PROCEDURE — 99284 EMERGENCY DEPT VISIT MOD MDM: CPT | Mod: 25

## 2024-07-21 PROCEDURE — 74177 CT ABD & PELVIS W/CONTRAST: CPT | Mod: 26,MC

## 2024-07-21 PROCEDURE — 36415 COLL VENOUS BLD VENIPUNCTURE: CPT

## 2024-07-21 PROCEDURE — 81001 URINALYSIS AUTO W/SCOPE: CPT

## 2024-07-21 PROCEDURE — 83690 ASSAY OF LIPASE: CPT

## 2024-07-21 RX ORDER — CEFTRIAXONE SODIUM 500 MG
1000 VIAL (EA) INJECTION ONCE
Refills: 0 | Status: COMPLETED | OUTPATIENT
Start: 2024-07-21 | End: 2024-07-21

## 2024-07-21 RX ORDER — CEFPODOXIME PROXETIL 50 MG/5 ML
1 SUSPENSION, RECONSTITUTED, ORAL (ML) ORAL
Qty: 14 | Refills: 0
Start: 2024-07-21 | End: 2024-07-27

## 2024-07-21 RX ADMIN — Medication 1000 MILLIGRAM(S): at 13:04

## 2024-07-21 RX ADMIN — Medication 100 MILLIGRAM(S): at 10:20

## 2024-07-21 NOTE — ED ADULT TRIAGE NOTE - CHIEF COMPLAINT QUOTE
positive UTI since Tuesday on abx x 5 days - pt has continued burning on urination and "indigestion". denies hematuria

## 2024-07-21 NOTE — ED ADULT NURSE NOTE - NSFALLUNIVINTERV_ED_ALL_ED
Bed/Stretcher in lowest position, wheels locked, appropriate side rails in place/Call bell, personal items and telephone in reach/Instruct patient to call for assistance before getting out of bed/chair/stretcher/Non-slip footwear applied when patient is off stretcher/[place] to call system/Physically safe environment - no spills, clutter or unnecessary equipment/Purposeful proactive rounding/Room/bathroom lighting operational, light cord in reach

## 2024-07-21 NOTE — ED PROVIDER NOTE - ATTENDING CONTRIBUTION TO CARE
Suyapa Lopez DO. EM Attending Physician.    84-year-old male with history of hypertension, hyperlipidemia, BPH, GERD, partial colectomy, presents to the ER with 1 week of dysuria s/p 5 days of amoxicillin presents with persistent dysuria and urinary frequency, suprapubic discomfort.    Arrived afebrile hemodynamically stable nontoxic-appearing.    Exam, generally nontoxic, head normocephalic, eyes clear, heart regular rate and rhythm, no lungs clear to auscultation bilaterally, abdomen with some epigastric abdominal tenderness on exam, no CVA tenderness bilaterally no rashes or lesions, skin clean and dry, no lower extremity clubbing cyanosis or edema.    Differential includes but not limited to incompletely treated UTI, low suspicion for pyelo, intraabdominal pathology such as gastritis/pancreatitis. Will obtain labs, CT, UA, and re-assess.    Attending Contribution to Care:  I performed a history and physical exam of the patient and discussed their management with the resident.. I reviewed the resident and agree with the documented findings and plan of care. I was present and available for all procedures.

## 2024-07-21 NOTE — ED PROVIDER NOTE - PATIENT PORTAL LINK FT
You can access the FollowMyHealth Patient Portal offered by [facility] by registering at the following website: [email or web address] By joining [employer]’s FollowMyHealth portal, you will also be able to view your health information using other applications (apps) compatible with our system.

## 2024-07-21 NOTE — ED ADULT NURSE NOTE - NS ED NOTE ABUSE SUSPICION NEGLECT YN
Health Maintenance Due   Topic Date Due   â¢ Diabetes Eye Exam  05/21/1964   â¢ Shingles Vaccine (2 of 3) 09/23/2015   â¢ DTaP/Tdap/Td Vaccine (2 - Td) 09/14/2019         tdap not covered by medicare  shingrix unavailable. MD aware that a second BP would be needed for documentation. No

## 2024-07-21 NOTE — ED PROVIDER NOTE - CLINICAL SUMMARY MEDICAL DECISION MAKING FREE TEXT BOX
Raymundo Nevarez MD, PGY3  84-year-old male with past medical history of hypertension, BPH presenting to emergency department with dysuria x 1 week.  Patient was seen at onset of symptoms by primary care physician and had urinalysis concerning for UTI, urine culture showing greater than 100,000 and E. coli.  Was put on Augmentin which was sensitive as per urine culture results.  Patient also followed up with his urologist, had renal ultrasound performed on Thursday, unsure of results.  Has taken 5 days of antibiotics.  Had mild improvement in symptoms at start of antibiotic course however has had worsening dysuria over the past 2 days.  Has never had urinary tract infections before in the past.  Does take Flomax for BPH.  Also endorsing minor abdominal discomfort which she describes as indigestion.  Denies fever, headache, chest pain, shortness of breath, nausea, vomiting, diarrhea, pain with defecation, extremity edema, rash.    In the emergency department patient is hemodynamically stable, afebrile.  Patient well-appearing in no acute distress.  On exam patient has minor generalized abdominal tenderness to palpation.  No flank tenderness.  Rest of exam unremarkable.  Differential including but not limited to urinary tract infection, pyelonephritis, prostatitis, pancreatitis, other intra-abdominal pathology.  Plan to obtain labs, urine, CT abdomen pelvis with IV contrast.  Will possibly change patient's antibiotics as although sensitive to Augmentin (S 8/4) on previous culture, that culture also showed resistance to Unasyn which is a similar antibiotic/class and patient now with patient worsening symptoms. will reassess.

## 2024-07-21 NOTE — ED PROVIDER NOTE - PROGRESS NOTE DETAILS
Raymundo Nevarez MD, PGY3  Urinalysis showing moderate leuks with 27 white blood cells.  Will treat for urinary tract infection.  Will give dose of ceftriaxone in the emergency department.  CT abdomen pelvis with no acute pathology, incidental hypodense foci in the spleen discussed with patient.  Will follow-up with PCP.  Will DC home.  Patient in agreement with plan.  Answered all questions and gave return precautions.

## 2024-07-21 NOTE — ED ADULT TRIAGE NOTE - RESPIRATORY RATE (BREATHS/MIN)
17 Neurology/Epilepsy Consult:    LINDYTRELL KAN 70y Male  MRN-[id number]    Patient is a 70y old right-handed Male who presents for elective VEEG monitoring    HPI: History is obtained from patient and EMR      PAST MEDICAL & SURGICAL HISTORY:  Malaria  Gastritis  Dyslipidemia  Squamous cell carcinoma: scalp  Afib  HTN   PTSD  Hernia, inguinal, bilateral, s/p repair 2017 and 2018      FAMILY HISTORY:  daughter - seizure disorder  son - autism, seizure disorder      Social History: (-) x 3      Allergy:  No Known Allergies      Home Medications:  amLODIPine 5 mg oral tablet: 1 tab(s) orally once a day   benazepril 40 mg oral tablet: 1 tab(s) orally once a day (2018 09:11)  Eliquis 5 mg oral tablet: 1 tab(s) orally 2 times a day (2018 09:11)  Protonix 40 mg oral delayed release tablet: 1 tab(s) orally once a day (2018 09:11)  rosuvastatin 10 mg oral tablet: 1 tab(s) orally once a day (at bedtime) (2018 09:11)  sotalol 80 mg oral tablet: 1.5 tab(s) orally 2 times a day (2018 09:11)      MEDICATIONS  (STANDING):  diVALproex  milliGRAM(s) Oral at bedtime      MEDICATIONS  (PRN):  LORazepam   Injectable 2 milliGRAM(s) IV Push three times a day PRN generalized tonic-clonic seizure lasting longer than 2 minutes, or two consecutive seizures without return to baseline in-between        T(F): 97 (18 @ 10:14), Max: 97 (18 @ 10:14)  HR: 60 (18 @ 10:14) (60 - 60)  BP: 183/84 (18 @ 10:14) (183/84 - 183/84)  RR: 16 (18 @ 10:14) (16 - 16)  SpO2: --    Neurologic Examination:  General:  Appearance is consistent with chronologic age.  No abnormal facies.   General: The patient is oriented to person, place, time and date.  Recent and remote memory intact.  Follows 4-step directions. Fund of knowledge is intact and normal.  Language with normal repetition, comprehension and naming.  Nondysarthric.    Cranial nerves: EOMI w/o nystagmus, skew or reported double vision.  PERRL.  No ptosis/weakness of eyelid closure.  Facial sensation is normal with normal bite.  No facial asymmetry.  Hearing grossly intact b/l.  Palate elevates midline.  Tongue midline.  Motor examination:   Normal tone, bulk and range of motion.  No tenderness, twitching, tremors or involuntary movements.  Formal Muscle Strength Testin/5 UE; 5/5 LE.  No observable drift.  Reflexes:   2+ b/l pectoralis, biceps, triceps, brachioradialis, patella and Achilles.  Plantar response downgoing b/l.    Sensory examination:   Intact to light touch and pinprick, pain.  Cerebellum:   FTN/HKS intact with normal [place] in all limbs.  No dysmetria or dysdiadokinesia.  Gait narrow based and normal.      Neuroimaging:  NCHCT:   < from: CT Head No Cont (18 @ 04:18) >  EXAM:  CT BRAIN            PROCEDURE DATE:  2018      INTERPRETATION:  CLINICAL HISTORY/REASON FOR EXAM: Seizure.    TECHNIQUE: Contiguous axial CT images of the head were obtained from the   base of the skull to the vertex without IV contrast.    COMPARISON: None.    FINDINGS:    The cortical sulci and ventricular system are  symmetrical and consistent   with the patient's age.      No acute intracranial hemorrhage. No mass effect, midline shift, or extra   axial  fluid collection.    Gray-white differentiation is maintained.     The bones are intact without depressed skull fracture. There is no soft   tissue swelling.     Globes and paranasal sinuses unremarkable. Mastoids poorly pneumatized    IMPRESSION:    Limited by motion artifact.    No CT evidence of acute intracranial pathology.    < end of copied text >    MRI Brain NC:   < from: MR Head No Cont (18 @ 19:37) >  EXAM:  MR BRAIN            PROCEDURE DATE:  2018      INTERPRETATION:  Clinical History / Reason for exam: Seizure activity.    Technique: Sagittal T1-weighted images, axial T2 weighted images, axial   FLAIR images, axial gradient echo images and axial diffusion weighted   images of the brain were obtained on the 1.5 Krystin magnet without   administration of intravenous contrast.    Comparison: Noncontrast CT head dated 2018.      Findings:  The study is limited due to patientability to complete the   entire exam and due to patient motion artifact. The ventricles, basal   cisterns and sulcal pattern are slightly prominent consistent with mild   parenchymal volume loss.  There are were a few scattered punctate foci of   T2and FLAIR hyperintensities in the periventricular and subcortical   white matter which are nonspecific and without mass effect and may   represent chronic small vessel ischemic changes versus normal aging.   There is is no acute mass effect, midline shift or hemorrhage.  No   extra-axial fluid collections are identified.      There are no acute infarcts on diffusion weighted images.  Expected   signal flow voids are noted in the major intracranial vessels consistent   with their patency.      Globes and orbits are grossly within normal limits. The paranasal sinuses   and bilateral mastoid complexes are within normal limits.      There is no bone marrow signal abnormality.  The sella is unremarkable.        IMPRESSION:    1.  Limited study dueto patient motion and motion artifact.    2.  Few scattered punctate foci of T2 and FLAIR hyperintensities which   are nonspecific and may represent chronic small vessel ischemic changes   versus normal aging.    3.  No acute infarcts or intracranialhemorrhage.     < end of copied text >      REEG: < from: EEG (18 @ 11:25) >  Impression  Abnormal due to the presence of: focal slowing as above, generalized   slowing as above    Clinical Correlation & Recommendations   Consistent with diffuse cerebral electrophysiological dysfunction with   suggestion of greater left hemispheric involvement.     < end of copied text >      Assessment:  This is a 70y Male with h/o HTN, A-fib, DLD, malaria, headaches, PTSD, inguinal hernia repair, s/p witnessed seizure episode 6 wks ago, on Depakote for the last 2 weeks.    Discussed with Dr. Finley    Plan:   - VEEG for better characterization and treatment plan  - Seizure precautions  - Continue home dose of Depakote XR, use patient own medication (ordered)  - BP control  - Ativan 2mg IV PRN for generalized tonic-clonic seizure lasting longer than 2 minutes, or two consecutive seizures without return to baseline in-between (ordered)  - CBC, CMP, Mg, VPA level trough (ordered)  - Keep Mg above 2    Plan discussed with patient in details, all questions answered  18 @ 13:08 Neurology/Epilepsy Consult:    TRELL ISRAEL 70y Male  MRN-[id number]    Patient is a 70y old right-handed Male who presents for elective VEEG monitoring    HPI: History is obtained from patient and EMR  On 2018 patient was admitted to [facility] s/p seizure episode witnessed by wife. That day patient had unusually severe persistent frontal headache, BP was around 200/100. Patient was seen by PMD, received additional BP meds (Norvasc and Clonidine). BP gradually improved, but later on that night patient got up from the bed, then fell backwards on the bed and had GTC seizure lasting about 2 minutes. Upon EMS arrival patient was agitated initially, then calmed down but was slightly confused. No tongue bite, no Davis's paralysis, no incontinence reported. On arrival to ED BP was 130/67, Na 126. Patient was given Keppra in the ED, but it was not continued. Patient had extensive work-up (see reports), was discharged on no AED.   Patient states about 1 month prior to that seizure he had several episodes that he describes as warm feeling traveling from the head down the body, lasting few sec, and associated with mild headache and drowsiness.  Two weeks ago patient followed up with Dr. Finley and was started on Depakote XR 250mg QHS, then dose was increased to 500mg QHS 3 days ago. Patient denies any prior history of seizures or confusional episodes.  Yesterday patient woke up in the middle of the night with severe pounding headache in the forehead. He took Motrin that did not provide any relief, then was evaluated by PMD and was found to have /110, was given additional doses of BP meds. Also, took some Percocet prior to sleep last night, and today woke up feeling better with just some pressure-like headache. On arrival to the EMU /84.    PAST MEDICAL & SURGICAL HISTORY:  Malaria  Gastritis  Dyslipidemia  Headaches  Squamous cell carcinoma: scalp  Afib  HTN   PTSD  Hernia, inguinal, bilateral, s/p repair 2017 and 2018      FAMILY HISTORY:  daughter - seizure disorder  son - autism, seizure disorder      Social History: (-) x 3      Allergy:  No Known Allergies      Home Medications:  amLODIPine 5 mg oral tablet: 1 tab(s) orally once a day   benazepril 40 mg oral tablet: 1 tab(s) orally once a day (2018 09:11)  Eliquis 5 mg oral tablet: 1 tab(s) orally 2 times a day (2018 09:11)  Protonix 40 mg oral delayed release tablet: 1 tab(s) orally once a day (2018 09:11)  rosuvastatin 10 mg oral tablet: 1 tab(s) orally once a day (at bedtime) (2018 09:11)  sotalol 80 mg oral tablet: 1.5 tab(s) orally 2 times a day (2018 09:11)      MEDICATIONS  (STANDING):  diVALproex  milliGRAM(s) Oral at bedtime      MEDICATIONS  (PRN):  LORazepam   Injectable 2 milliGRAM(s) IV Push three times a day PRN generalized tonic-clonic seizure lasting longer than 2 minutes, or two consecutive seizures without return to baseline in-between        T(F): 97 (18 @ 10:14), Max: 97 (18 @ 10:14)  HR: 60 (18 @ 10:14) (60 - 60)  BP: 183/84 (-18 @ 10:14) (183/84 - 183/84)  RR: 16 (-18 @ 10:14) (16 - 16)  SpO2: --    Neurologic Examination:  General:  Appearance is consistent with chronologic age.  No abnormal facies.   General: The patient is oriented to person, place, time and date.  Recent and remote memory intact.  Follows 4-step directions. Fund of knowledge is intact and normal.  Language with normal repetition, comprehension and naming.  Nondysarthric.    Cranial nerves: EOMI w/o nystagmus, skew or reported double vision.  PERRL.  No ptosis/weakness of eyelid closure.  Facial sensation is normal with normal bite.  No facial asymmetry.  Hearing grossly intact b/l.  Palate elevates midline.  Tongue midline.  Motor examination:   Normal tone, bulk and range of motion.  No tenderness, twitching, tremors or involuntary movements.  Formal Muscle Strength Testin/5 UE; 5/5 LE.  No observable drift.  Reflexes:   2+ b/l pectoralis, biceps, triceps, brachioradialis, patella and Achilles.  Plantar response downgoing b/l.    Sensory examination:   Intact to light touch and pinprick, pain.  Cerebellum:   FTN/HKS intact with normal [place] in all limbs.  No dysmetria or dysdiadokinesia.  Gait narrow based and normal.      Neuroimaging:  NCHCT:   < from: CT Head No Cont (18 @ 04:18) >  EXAM:  CT BRAIN            PROCEDURE DATE:  2018      INTERPRETATION:  CLINICAL HISTORY/REASON FOR EXAM: Seizure.    TECHNIQUE: Contiguous axial CT images of the head were obtained from the   base of the skull to the vertex without IV contrast.    COMPARISON: None.    FINDINGS:    The cortical sulci and ventricular system are  symmetrical and consistent   with the patient's age.      No acute intracranial hemorrhage. No mass effect, midline shift, or extra   axial  fluid collection.    Gray-white differentiation is maintained.     The bones are intact without depressed skull fracture. There is no soft   tissue swelling.     Globes and paranasal sinuses unremarkable. Mastoids poorly pneumatized    IMPRESSION:    Limited by motion artifact.    No CT evidence of acute intracranial pathology.    < end of copied text >    MRI Brain NC:   < from: MR Head No Cont (18 @ 19:37) >  EXAM:  MR BRAIN            PROCEDURE DATE:  2018      INTERPRETATION:  Clinical History / Reason for exam: Seizure activity.    Technique: Sagittal T1-weighted images, axial T2 weighted images, axial   FLAIR images, axial gradient echo images and axial diffusion weighted   images of the brain were obtained on the 1.5 Krystin magnet without   administration of intravenous contrast.    Comparison: Noncontrast CT head dated 2018.      Findings:  The study is limited due to patientability to complete the   entire exam and due to patient motion artifact. The ventricles, basal   cisterns and sulcal pattern are slightly prominent consistent with mild   parenchymal volume loss.  There are were a few scattered punctate foci of   T2and FLAIR hyperintensities in the periventricular and subcortical   white matter which are nonspecific and without mass effect and may   represent chronic small vessel ischemic changes versus normal aging.   There is is no acute mass effect, midline shift or hemorrhage.  No   extra-axial fluid collections are identified.      There are no acute infarcts on diffusion weighted images.  Expected   signal flow voids are noted in the major intracranial vessels consistent   with their patency.      Globes and orbits are grossly within normal limits. The paranasal sinuses   and bilateral mastoid complexes are within normal limits.      There is no bone marrow signal abnormality.  The sella is unremarkable.        IMPRESSION:    1.  Limited study dueto patient motion and motion artifact.    2.  Few scattered punctate foci of T2 and FLAIR hyperintensities which   are nonspecific and may represent chronic small vessel ischemic changes   versus normal aging.    3.  No acute infarcts or intracranialhemorrhage.     < end of copied text >      REEG: < from: EEG (18 @ 11:25) >  Impression  Abnormal due to the presence of: focal slowing as above, generalized   slowing as above    Clinical Correlation & Recommendations   Consistent with diffuse cerebral electrophysiological dysfunction with   suggestion of greater left hemispheric involvement.     < end of copied text >      Assessment:  This is a 70y Male with h/o HTN, A-fib, DLD, malaria, headaches, PTSD, inguinal hernia repair, s/p witnessed seizure episode 6 wks ago, on Depakote for the last 2 weeks.    Discussed with Dr. Finley    Plan:   - VEEG for better characterization and treatment plan  - Seizure precautions  - Continue home dose of Depakote XR, may use patient own medication (ordered)  - BP control  - Ativan 2mg IV PRN for generalized tonic-clonic seizure lasting longer than 2 minutes, or two consecutive seizures without return to baseline in-between (ordered)  - CBC, CMP, Mg, VPA level trough (ordered)  - Keep Mg above 2    Plan discussed with patient in details, all questions answered  18 @ 13:08 Neurology/Epilepsy Consult:    TRELL ISRAEL 70y Male  MRN-[id number]    Patient is a 70y old right-handed Male who presents for elective VEEG monitoring    HPI: History is obtained from patient and EMR  On 2018 patient was admitted to [facility] s/p seizure episode witnessed by wife. That day patient had unusually severe persistent frontal headache, BP was around 200/100. Patient was seen by PMD, received additional BP meds (Norvasc and Clonidine). BP gradually improved, but later on that night patient got up from the bed, then fell backwards on the bed and had GTC seizure lasting about 2 minutes. Upon EMS arrival patient was agitated initially, then calmed down but was slightly confused. No tongue bite, no Davis's paralysis, no incontinence reported. On arrival to ED BP was 130/67, Na 126. Patient was given Keppra in the ED, but it was not continued. Patient had extensive work-up (see reports), was discharged on no AED.   Patient states about 1 month prior to that seizure he had several episodes that he describes as warm feeling traveling from the head down the body, lasting few sec, and associated with mild headache and drowsiness.  Two weeks ago patient followed up with Dr. Finley and was started on Depakote XR 250mg QHS, then dose was increased to 500mg QHS 3 days ago. Patient denies any prior history of seizures or confusional episodes.  Yesterday patient woke up in the middle of the night with severe pounding headache in the forehead. He took Motrin that did not provide any relief, then was evaluated by PMD and was found to have /110, was given additional doses of BP meds. Also, took some Percocet prior to sleep last night, and today woke up feeling better with just some pressure-like headache. On arrival to the EMU /84.    PAST MEDICAL & SURGICAL HISTORY:  Malaria  Gastritis  Dyslipidemia  Headaches  Squamous cell carcinoma: scalp  Afib  HTN   PTSD  Hernia, inguinal, bilateral, s/p repair 2017 and 2018      FAMILY HISTORY:  daughter - seizure disorder  son - autism, seizure disorder      Social History: (-) x 3      Allergy:  No Known Allergies      Home Medications:  Depakote ER 500mg QHS  amLODIPine 5 mg oral tablet: 1 tab(s) orally once a day   benazepril 40 mg oral tablet: 1 tab(s) orally once a day (2018 09:11)  Eliquis 5 mg oral tablet: 1 tab(s) orally 2 times a day (2018 09:11)  Protonix 40 mg oral delayed release tablet: 1 tab(s) orally once a day (2018 09:11)  rosuvastatin 10 mg oral tablet: 1 tab(s) orally once a day (at bedtime) (2018 09:11)  sotalol 80 mg oral tablet: 1.5 tab(s) orally 2 times a day (2018 09:11)      MEDICATIONS  (STANDING):  diVALproex  milliGRAM(s) Oral at bedtime      MEDICATIONS  (PRN):  LORazepam   Injectable 2 milliGRAM(s) IV Push three times a day PRN generalized tonic-clonic seizure lasting longer than 2 minutes, or two consecutive seizures without return to baseline in-between        T(F): 97 (18 @ 10:14), Max: 97 (18 @ 10:14)  HR: 60 (18 @ 10:14) (60 - 60)  BP: 183/84 (-18 @ 10:14) (183/84 - 183/84)  RR: 16 (-18 @ 10:14) (16 - 16)  SpO2: --    Neurologic Examination:  General:  Appearance is consistent with chronologic age.  No abnormal facies.   General: The patient is oriented to person, place, time and date.  Recent and remote memory intact.  Follows 4-step directions. Fund of knowledge is intact and normal.  Language with normal repetition, comprehension and naming.  Nondysarthric.    Cranial nerves: EOMI w/o nystagmus, skew or reported double vision.  PERRL.  No ptosis/weakness of eyelid closure.  Facial sensation is normal with normal bite.  No facial asymmetry.  Hearing grossly intact b/l.  Palate elevates midline.  Tongue midline.  Motor examination:   Normal tone, bulk and range of motion.  No tenderness, twitching, tremors or involuntary movements.  Formal Muscle Strength Testin/5 UE; 5/5 LE.  No observable drift.  Reflexes:   2+ b/l pectoralis, biceps, triceps, brachioradialis, patella and Achilles.  Plantar response downgoing b/l.    Sensory examination:   Intact to light touch and pinprick, pain.  Cerebellum:   FTN/HKS intact with normal [place] in all limbs.  No dysmetria or dysdiadokinesia.  Gait narrow based and normal.      Neuroimaging:  NCHCT:   < from: CT Head No Cont (18 @ 04:18) >  EXAM:  CT BRAIN            PROCEDURE DATE:  2018      INTERPRETATION:  CLINICAL HISTORY/REASON FOR EXAM: Seizure.    TECHNIQUE: Contiguous axial CT images of the head were obtained from the   base of the skull to the vertex without IV contrast.    COMPARISON: None.    FINDINGS:    The cortical sulci and ventricular system are  symmetrical and consistent   with the patient's age.      No acute intracranial hemorrhage. No mass effect, midline shift, or extra   axial  fluid collection.    Gray-white differentiation is maintained.     The bones are intact without depressed skull fracture. There is no soft   tissue swelling.     Globes and paranasal sinuses unremarkable. Mastoids poorly pneumatized    IMPRESSION:    Limited by motion artifact.    No CT evidence of acute intracranial pathology.    < end of copied text >    MRI Brain NC:   < from: MR Head No Cont (18 @ 19:37) >  EXAM:  MR BRAIN            PROCEDURE DATE:  2018      INTERPRETATION:  Clinical History / Reason for exam: Seizure activity.    Technique: Sagittal T1-weighted images, axial T2 weighted images, axial   FLAIR images, axial gradient echo images and axial diffusion weighted   images of the brain were obtained on the 1.5 Krystin magnet without   administration of intravenous contrast.    Comparison: Noncontrast CT head dated 2018.      Findings:  The study is limited due to patientability to complete the   entire exam and due to patient motion artifact. The ventricles, basal   cisterns and sulcal pattern are slightly prominent consistent with mild   parenchymal volume loss.  There are were a few scattered punctate foci of   T2and FLAIR hyperintensities in the periventricular and subcortical   white matter which are nonspecific and without mass effect and may   represent chronic small vessel ischemic changes versus normal aging.   There is is no acute mass effect, midline shift or hemorrhage.  No   extra-axial fluid collections are identified.      There are no acute infarcts on diffusion weighted images.  Expected   signal flow voids are noted in the major intracranial vessels consistent   with their patency.      Globes and orbits are grossly within normal limits. The paranasal sinuses   and bilateral mastoid complexes are within normal limits.      There is no bone marrow signal abnormality.  The sella is unremarkable.        IMPRESSION:    1.  Limited study dueto patient motion and motion artifact.    2.  Few scattered punctate foci of T2 and FLAIR hyperintensities which   are nonspecific and may represent chronic small vessel ischemic changes   versus normal aging.    3.  No acute infarcts or intracranialhemorrhage.     < end of copied text >      REEG: < from: EEG (18 @ 11:25) >  Impression  Abnormal due to the presence of: focal slowing as above, generalized   slowing as above    Clinical Correlation & Recommendations   Consistent with diffuse cerebral electrophysiological dysfunction with   suggestion of greater left hemispheric involvement.     < end of copied text >      Assessment:  This is a 70y Male with h/o HTN, A-fib, DLD, malaria, headaches, PTSD, inguinal hernia repair, s/p witnessed seizure episode 6 wks ago, on Depakote for the last 2 weeks.    Discussed with Dr. Finley    Plan:   - VEEG for better characterization and treatment plan  - Seizure precautions  - Continue home dose of Depakote ER, may use patient own medication (ordered)  - BP control  - Ativan 2mg IV PRN for generalized tonic-clonic seizure lasting longer than 2 minutes, or two consecutive seizures without return to baseline in-between (ordered)  - CBC, CMP, Mg, VPA level trough (ordered)  - Keep Mg above 2    Plan discussed with patient in details, all questions answered  18 @ 13:08

## 2024-07-22 LAB
CULTURE RESULTS: SIGNIFICANT CHANGE UP
SPECIMEN SOURCE: SIGNIFICANT CHANGE UP

## 2024-07-30 ENCOUNTER — APPOINTMENT (OUTPATIENT)
Dept: INTERNAL MEDICINE | Facility: CLINIC | Age: 84
End: 2024-07-30
Payer: MEDICARE

## 2024-07-30 VITALS
SYSTOLIC BLOOD PRESSURE: 158 MMHG | BODY MASS INDEX: 22.54 KG/M2 | OXYGEN SATURATION: 97 % | DIASTOLIC BLOOD PRESSURE: 71 MMHG | HEIGHT: 67.8 IN | WEIGHT: 147 LBS | HEART RATE: 53 BPM

## 2024-07-30 DIAGNOSIS — R74.8 ABNORMAL LEVELS OF OTHER SERUM ENZYMES: ICD-10-CM

## 2024-07-30 PROCEDURE — 99214 OFFICE O/P EST MOD 30 MIN: CPT

## 2024-07-30 PROCEDURE — 36415 COLL VENOUS BLD VENIPUNCTURE: CPT

## 2024-07-30 PROCEDURE — G2211 COMPLEX E/M VISIT ADD ON: CPT

## 2024-07-30 NOTE — ASSESSMENT
[FreeTextEntry1] : Patient is status post a serious urinary tract infection.    Abnormal liver chemistries: This is most likely related to his recent bacterial infection.  However, in addition to repeating his liver chemistries today I we will get a hepatitis A IgM, and hepatitis B and C markers.  The patient was reassured that it is not uncommon in someone his age to take a good deal of time to feel back to normal after having a serious urinary tract infection.  I have spent a total of 35 minutes on the day of the visit both face to face and non-face to face with the patient.

## 2024-07-30 NOTE — PHYSICAL EXAM
[No Acute Distress] : no acute distress [Well Nourished] : well nourished [No Respiratory Distress] : no respiratory distress  [No Accessory Muscle Use] : no accessory muscle use [Normal Rate] : normal rate  [Regular Rhythm] : with a regular rhythm [Soft] : abdomen soft [Non Tender] : non-tender [No HSM] : no HSM [Normal Bowel Sounds] : normal bowel sounds

## 2024-07-30 NOTE — HISTORY OF PRESENT ILLNESS
[de-identified] : Follow up of severe urinary tract infection. No further urinary symptoms. He does not feel back to baseline.  He feels sluggish. I reviewed the CT and reassured the patient that he had no evidence of a neoplasm of the liver or biliary tract.

## 2024-07-30 NOTE — REVIEW OF SYSTEMS
[Fever] : no fever [Chills] : no chills [Fatigue] : fatigue [Chest Pain] : no chest pain [Palpitations] : no palpitations [Shortness Of Breath] : no shortness of breath

## 2024-07-31 LAB
ALBUMIN SERPL ELPH-MCNC: 4.4 G/DL
ALP BLD-CCNC: 163 U/L
ALT SERPL-CCNC: 33 U/L
ANION GAP SERPL CALC-SCNC: 13 MMOL/L
AST SERPL-CCNC: 22 U/L
BILIRUB SERPL-MCNC: 0.3 MG/DL
BUN SERPL-MCNC: 22 MG/DL
CALCIUM SERPL-MCNC: 10 MG/DL
CHLORIDE SERPL-SCNC: 97 MMOL/L
CO2 SERPL-SCNC: 22 MMOL/L
CREAT SERPL-MCNC: 1.17 MG/DL
EGFR: 61 ML/MIN/1.73M2
GLUCOSE SERPL-MCNC: 114 MG/DL
HAV IGM SER QL: NONREACTIVE
HBV SURFACE AB SER QL: NONREACTIVE
HBV SURFACE AG SER QL: NONREACTIVE
HCT VFR BLD CALC: 42.8 %
HCV AB SER QL: NONREACTIVE
HCV S/CO RATIO: 0.09 S/CO
HGB BLD-MCNC: 13.8 G/DL
MCHC RBC-ENTMCNC: 31.5 PG
MCHC RBC-ENTMCNC: 32.2 GM/DL
MCV RBC AUTO: 97.7 FL
PLATELET # BLD AUTO: 352 K/UL
POTASSIUM SERPL-SCNC: 4.7 MMOL/L
PROT SERPL-MCNC: 7.3 G/DL
RBC # BLD: 4.38 M/UL
RBC # FLD: 13.2 %
SODIUM SERPL-SCNC: 132 MMOL/L
WBC # FLD AUTO: 6.69 K/UL

## 2024-08-01 LAB — BACTERIA UR CULT: NORMAL

## 2024-08-06 ENCOUNTER — APPOINTMENT (OUTPATIENT)
Dept: PULMONOLOGY | Facility: CLINIC | Age: 84
End: 2024-08-06

## 2024-08-06 PROCEDURE — 99214 OFFICE O/P EST MOD 30 MIN: CPT | Mod: 25

## 2024-08-06 PROCEDURE — 94010 BREATHING CAPACITY TEST: CPT

## 2024-08-06 PROCEDURE — 94729 DIFFUSING CAPACITY: CPT

## 2024-08-06 PROCEDURE — 94727 GAS DIL/WSHOT DETER LNG VOL: CPT

## 2024-08-06 PROCEDURE — ZZZZZ: CPT

## 2024-08-06 NOTE — PROCEDURE
[FreeTextEntry1] : PFT: Normal spirometry.  Lung volumes normal. DLCO normal.  Prior exams reviewed:    	 EXAM: [id number] - CT CHEST  - ORDERED BY: LEI MAI   PROCEDURE DATE:  07/20/2023    INTERPRETATION:  CLINICAL STATEMENT: Lung nodules.  TECHNIQUE: Noncontrast CT of the chest was performed. Lack of IV contrast limits evaluation of the vascular structures and hilar regions. No priors for comparison. COMPARISON: 8/4/2022.  FINDINGS:  Lymphadenopathy: No significant axillary, obvious hilar or mediastinal lymphadenopathy.  Pleural effusions: None. Pericardial effusion: None. Coronary artery calcifications.  Central airways: No evidence of endobronchial lesion. Parenchyma: Mild biapical scarring again noted. Additional stable scarring and/or atelectasis right upper and right greater than left lower lobes. Stable 0.4 cm noncalcified subpleural nodule right upper lobe (series 2 image 64). Stable 0.2 cm noncalcified subpleural nodule right upper lobe (series 2 image 33). Stable 0.3 cm calcified granuloma left upper lobe (series 2 image 27). Stable 0.4 cm calcified granuloma lingula segment left upper lobe (series 2 image 101). Stable 0.2 cm noncalcified nodule lingular segment left upper lobe (series 2 image 99).  Evaluation of the visualized upper abdomen and osseous structures: Calcified aortic plaque, without aneurysm. Visualized upper abdomen grossly unremarkable. Extensive degenerative change of the visualized spine. No aggressive osseous lesion.  IMPRESSION:  No significant change compared with 8/4/2022. 1. Stable bilateral noncalcified nodules and calcified granulomas measuring up to 0.4 cm. No new/suspicious pulmonary nodules. 2. No suspicious thoracic lymph nodes, pleural or pericardial effusions. 3. Additional findings above.  --- End of Report ---       HELEN VALLECILLO M.D., ATTENDING RADIOLOGIST This document has been electronically signed. Jul 26 2023  8:51AM  PFT: mild obstruction without a significant bronchodilator response.  Lung volumes normal. DLCO normal.  FVC improved from 2019.   EXAM: [id number] - CT CHEST - ORDERED BY: ARVIN CLEMENS   PROCEDURE DATE: 08/04/2022    INTERPRETATION: INDICATION: Dyspnea on exertion  TECHNIQUE: Volumetric images of the chest without intravenous contrast. Maximum intensity projection images were generated.  COMPARISON: CT abdomen and pelvis August 6, 2015.  FINDINGS:  CARDIOVASCULAR, MEDIASTINUM, THYROID: Visualized thyroid gland: Grossly unremarkable.  Vasculature: Thoracic aorta: Normal in caliber and course. The ascending aorta measures 3.7 cm at the level of the main pulmonary artery and the descending aorta measures 2.4 cm at the same level. Mild atherosclerotic calcification of the thoracic aorta. There are aortic valvular calcifications. Main pulmonary artery: Normal in caliber.  Heart and pericardium: Normal heart size. No pericardial effusion. Multivessel coronary artery calcifications are visualized.  Esophagus: Normally decompressed.  LYMPH NODES: No mediastinal or axillary lymphadenopathy. No gross hilar adenopathy.  MAJOR AIRWAYS: Patent trachea and mainstem bronchi.  LUNGS: No focal consolidation. No evidence of interstitial lung disease.  Scattered indeterminate pulmonary nodules including a 4 mm right upper lobe nodule (series 3, image 256) and 2 mm right lower lobe nodule (series 3, image 395). There is a 2 mm right middle lobe nodule abutting the minor fissure (series 3, image 365), likely representing an intrapulmonary lymph node.  PLEURA: No pleural effusion or pneumothorax.  UPPER ABDOMEN: Evaluation of the partially-imaged upper abdomen demonstrates no acute abnormality.  CHEST WALL: Normal.  BONES: Mild multilevel degenerative disc disease.  IMPRESSION:  No evidence of pneumonia, pleural effusion, or pneumothorax.  Scattered bilateral indeterminate pulmonary nodules measuring up to 4 mm in size, for which a follow-up chest CT can be considered in one year if this patient has an elevated risk of lung cancer (such as a history of cigarette smoking, family history of lung cancer, or has had significant exposure to asbestos or radon).  --- End of Report ---        SAMI JONES DO; Attending Radiologist This document has been electronically signed. Aug 4 2022 1:09PM

## 2024-08-06 NOTE — HISTORY OF PRESENT ILLNESS
[Former] : former [TextBox_4] : recently hospitalized for UTI tired from that  no resp complaints due for lung nodule fu

## 2024-08-06 NOTE — ASSESSMENT
[FreeTextEntry1] : obtain updated ct chest now, will call with results  fu 1 year if above ok  Total encounter time 30 minutes.

## 2024-08-08 ENCOUNTER — NON-APPOINTMENT (OUTPATIENT)
Age: 84
End: 2024-08-08

## 2024-08-09 ENCOUNTER — APPOINTMENT (OUTPATIENT)
Dept: INTERNAL MEDICINE | Facility: CLINIC | Age: 84
End: 2024-08-09

## 2024-08-09 PROBLEM — E87.1 HYPONATREMIA: Status: ACTIVE | Noted: 2024-08-09

## 2024-08-09 PROCEDURE — 36415 COLL VENOUS BLD VENIPUNCTURE: CPT

## 2024-08-09 PROCEDURE — G2211 COMPLEX E/M VISIT ADD ON: CPT

## 2024-08-09 PROCEDURE — 99214 OFFICE O/P EST MOD 30 MIN: CPT

## 2024-08-09 PROCEDURE — 81003 URINALYSIS AUTO W/O SCOPE: CPT | Mod: QW

## 2024-08-09 NOTE — ASSESSMENT
[FreeTextEntry1] : Problems UTI-his present urinalysis revealed small amount of blood and leukocytes.  The urine was sent for culture. Hypertension-the patient is presently on amlodipine 2-1/2 mg daily and losartan 25 mg daily.  His blood pressure sitting was normal at 130/60 but dropped to 110/60 while standing.  And he did experience some mild dizziness of note is that his last serum sodium was 130.  It is possible that the patient's fatigue and dizzy dizziness may be secondary to mild orthostatic hypotension which may be aggravated by being on Flomax 0.4 mg a day.  I advised the patient to increase his sodium intake by having the a glass of Gatorade daily.  And in addition bloods were drawn to repeat his sodium level. Splenic defect-ultrasound of the abdomen is pending

## 2024-08-09 NOTE — HISTORY OF PRESENT ILLNESS
[de-identified] : This is an 84-year-old gentleman with chief complaint of feeling weak and occasionally dizzy which gets worse when he stands up.  The patient was recently treated for significant UTI with cephalosporins.  Recent CAT scan of the abdomen and pelvis revealed normal kidneys and splenic defect.  For which she is scheduled for an ultrasound of the abdomen.  He presently denies any dysuria or urinary frequency

## 2024-08-12 ENCOUNTER — APPOINTMENT (OUTPATIENT)
Dept: ULTRASOUND IMAGING | Facility: CLINIC | Age: 84
End: 2024-08-12
Payer: MEDICARE

## 2024-08-12 ENCOUNTER — APPOINTMENT (OUTPATIENT)
Dept: CT IMAGING | Facility: CLINIC | Age: 84
End: 2024-08-12
Payer: MEDICARE

## 2024-08-12 PROCEDURE — 71250 CT THORAX DX C-: CPT | Mod: 26,MH

## 2024-08-12 PROCEDURE — 76700 US EXAM ABDOM COMPLETE: CPT | Mod: 26

## 2024-08-20 ENCOUNTER — APPOINTMENT (OUTPATIENT)
Dept: MRI IMAGING | Facility: IMAGING CENTER | Age: 84
End: 2024-08-20
Payer: MEDICARE

## 2024-08-20 ENCOUNTER — OUTPATIENT (OUTPATIENT)
Dept: OUTPATIENT SERVICES | Facility: HOSPITAL | Age: 84
LOS: 1 days | End: 2024-08-20
Payer: MEDICARE

## 2024-08-20 DIAGNOSIS — Z90.49 ACQUIRED ABSENCE OF OTHER SPECIFIED PARTS OF DIGESTIVE TRACT: Chronic | ICD-10-CM

## 2024-08-20 DIAGNOSIS — R74.8 ABNORMAL LEVELS OF OTHER SERUM ENZYMES: ICD-10-CM

## 2024-08-20 PROCEDURE — 74183 MRI ABD W/O CNTR FLWD CNTR: CPT

## 2024-08-20 PROCEDURE — 74183 MRI ABD W/O CNTR FLWD CNTR: CPT | Mod: 26,MH

## 2024-08-20 PROCEDURE — A9585: CPT

## 2024-08-26 ENCOUNTER — APPOINTMENT (OUTPATIENT)
Dept: INTERNAL MEDICINE | Facility: CLINIC | Age: 84
End: 2024-08-26
Payer: MEDICARE

## 2024-08-26 VITALS
BODY MASS INDEX: 22.76 KG/M2 | DIASTOLIC BLOOD PRESSURE: 80 MMHG | SYSTOLIC BLOOD PRESSURE: 120 MMHG | HEIGHT: 67 IN | WEIGHT: 145 LBS

## 2024-08-26 DIAGNOSIS — N39.0 URINARY TRACT INFECTION, SITE NOT SPECIFIED: ICD-10-CM

## 2024-08-26 LAB
BILIRUB UR QL STRIP: NEGATIVE
CLARITY UR: CLEAR
COLLECTION METHOD: NORMAL
GLUCOSE UR-MCNC: NEGATIVE
HCG UR QL: 0.2 EU/DL
HGB UR QL STRIP.AUTO: NORMAL
KETONES UR-MCNC: NORMAL
LEUKOCYTE ESTERASE UR QL STRIP: NORMAL
NITRITE UR QL STRIP: NEGATIVE
PH UR STRIP: 7
PROT UR STRIP-MCNC: NEGATIVE
SP GR UR STRIP: 1.02

## 2024-08-26 PROCEDURE — 81003 URINALYSIS AUTO W/O SCOPE: CPT | Mod: QW

## 2024-08-26 PROCEDURE — 99214 OFFICE O/P EST MOD 30 MIN: CPT

## 2024-08-26 PROCEDURE — G2211 COMPLEX E/M VISIT ADD ON: CPT

## 2024-08-26 NOTE — ADDENDUM
[FreeTextEntry1] : I spent 30 minutes face-to-face to the patient and his wife reviewing emergency room visit outpatient MRI and laboratory

## 2024-08-26 NOTE — HISTORY OF PRESENT ILLNESS
[FreeTextEntry8] : This is an 84-year-old male who recently was treated for urinary tract infection.  He was referred to urology and also for an ultrasound.  He comes in today because he is concerned that the infection has not dissipated  Patient gives a complicated course over about a month patient has been having symptoms treating initially.  Recurrence emergency room IV antibiotic changed to p.o.  Had recurrence apparently sensitivities looked at and switched to Cipro which she never took because he looked at the side effects while taking he did not take the Cipro just just unable to do but he did not take

## 2024-08-26 NOTE — ASSESSMENT
[FreeTextEntry1] : This is an 84-year-old male who recently was treated for urinary tract infection.  He was referred to urology and also for an ultrasound.  He comes in today because he is concerned that the infection has not dissipated  Patient gives a complicated course over about a month patient has been having symptoms treating initially.  Recurrence emergency room IV antibiotic changed to p.o.  Had recurrence apparently sensitivities looked at and switched to Cipro which she never took because he looked at the side effects while taking he did not take the Cipro just just unable to do but he did not take  I did review his emergency room visit and testing.  In addition we reviewed his sensitivities from the urine culture of August 9.  He currently he has minimal signs.  But he does have some leukocytes and trace blood in the urine  We did review his recent MRI which was negative  We did send out a urine culture and I referred him back to urology he may have prostatitis and this may require long-term antibiotics.

## 2024-08-27 ENCOUNTER — NON-APPOINTMENT (OUTPATIENT)
Age: 84
End: 2024-08-27

## 2024-08-27 ENCOUNTER — INPATIENT (INPATIENT)
Facility: HOSPITAL | Age: 84
LOS: 4 days | Discharge: HOME | DRG: 313 | End: 2024-09-01
Attending: STUDENT IN AN ORGANIZED HEALTH CARE EDUCATION/TRAINING PROGRAM | Admitting: STUDENT IN AN ORGANIZED HEALTH CARE EDUCATION/TRAINING PROGRAM
Payer: MEDICARE

## 2024-08-27 ENCOUNTER — APPOINTMENT (OUTPATIENT)
Dept: INTERNAL MEDICINE | Facility: CLINIC | Age: 84
End: 2024-08-27
Payer: MEDICARE

## 2024-08-27 VITALS
HEIGHT: 67 IN | RESPIRATION RATE: 20 BRPM | OXYGEN SATURATION: 98 % | WEIGHT: 145.73 LBS | DIASTOLIC BLOOD PRESSURE: 73 MMHG | TEMPERATURE: 98 F | HEART RATE: 50 BPM | SYSTOLIC BLOOD PRESSURE: 135 MMHG

## 2024-08-27 VITALS
BODY MASS INDEX: 22.6 KG/M2 | SYSTOLIC BLOOD PRESSURE: 120 MMHG | HEIGHT: 67 IN | OXYGEN SATURATION: 99 % | HEART RATE: 49 BPM | DIASTOLIC BLOOD PRESSURE: 50 MMHG | WEIGHT: 144 LBS

## 2024-08-27 DIAGNOSIS — R07.9 CHEST PAIN, UNSPECIFIED: ICD-10-CM

## 2024-08-27 DIAGNOSIS — I10 ESSENTIAL (PRIMARY) HYPERTENSION: ICD-10-CM

## 2024-08-27 DIAGNOSIS — H40.9 UNSPECIFIED GLAUCOMA: ICD-10-CM

## 2024-08-27 DIAGNOSIS — N40.0 BENIGN PROSTATIC HYPERPLASIA WITHOUT LOWER URINARY TRACT SYMPTOMS: ICD-10-CM

## 2024-08-27 DIAGNOSIS — E87.1 HYPO-OSMOLALITY AND HYPONATREMIA: ICD-10-CM

## 2024-08-27 DIAGNOSIS — Z29.9 ENCOUNTER FOR PROPHYLACTIC MEASURES, UNSPECIFIED: ICD-10-CM

## 2024-08-27 DIAGNOSIS — R07.89 OTHER CHEST PAIN: ICD-10-CM

## 2024-08-27 DIAGNOSIS — Z90.49 ACQUIRED ABSENCE OF OTHER SPECIFIED PARTS OF DIGESTIVE TRACT: Chronic | ICD-10-CM

## 2024-08-27 DIAGNOSIS — K21.9 GASTRO-ESOPHAGEAL REFLUX DISEASE WITHOUT ESOPHAGITIS: ICD-10-CM

## 2024-08-27 DIAGNOSIS — I77.810 THORACIC AORTIC ECTASIA: ICD-10-CM

## 2024-08-27 DIAGNOSIS — Z90.89 ACQUIRED ABSENCE OF OTHER ORGANS: Chronic | ICD-10-CM

## 2024-08-27 DIAGNOSIS — N39.0 URINARY TRACT INFECTION, SITE NOT SPECIFIED: ICD-10-CM

## 2024-08-27 LAB
ALBUMIN SERPL ELPH-MCNC: 4 G/DL — SIGNIFICANT CHANGE UP (ref 3.3–5)
ALP SERPL-CCNC: 116 U/L — SIGNIFICANT CHANGE UP (ref 40–120)
ALT FLD-CCNC: 27 U/L — SIGNIFICANT CHANGE UP (ref 10–45)
ANION GAP SERPL CALC-SCNC: 12 MMOL/L — SIGNIFICANT CHANGE UP (ref 5–17)
APTT BLD: 28 SEC — SIGNIFICANT CHANGE UP (ref 24.5–35.6)
AST SERPL-CCNC: 21 U/L — SIGNIFICANT CHANGE UP (ref 10–40)
BASOPHILS # BLD AUTO: 0.03 K/UL — SIGNIFICANT CHANGE UP (ref 0–0.2)
BASOPHILS NFR BLD AUTO: 0.5 % — SIGNIFICANT CHANGE UP (ref 0–2)
BILIRUB SERPL-MCNC: 0.5 MG/DL — SIGNIFICANT CHANGE UP (ref 0.2–1.2)
BUN SERPL-MCNC: 20 MG/DL — SIGNIFICANT CHANGE UP (ref 7–23)
CALCIUM SERPL-MCNC: 10 MG/DL — SIGNIFICANT CHANGE UP (ref 8.4–10.5)
CHLORIDE SERPL-SCNC: 101 MMOL/L — SIGNIFICANT CHANGE UP (ref 96–108)
CK MB CFR SERPL CALC: 2 NG/ML — SIGNIFICANT CHANGE UP (ref 0–6.7)
CO2 SERPL-SCNC: 20 MMOL/L — LOW (ref 22–31)
CREAT SERPL-MCNC: 0.96 MG/DL — SIGNIFICANT CHANGE UP (ref 0.5–1.3)
EGFR: 78 ML/MIN/1.73M2 — SIGNIFICANT CHANGE UP
EOSINOPHIL # BLD AUTO: 0.08 K/UL — SIGNIFICANT CHANGE UP (ref 0–0.5)
EOSINOPHIL NFR BLD AUTO: 1.4 % — SIGNIFICANT CHANGE UP (ref 0–6)
GLUCOSE SERPL-MCNC: 110 MG/DL — HIGH (ref 70–99)
HCT VFR BLD CALC: 40.4 % — SIGNIFICANT CHANGE UP (ref 39–50)
HGB BLD-MCNC: 13.4 G/DL — SIGNIFICANT CHANGE UP (ref 13–17)
IMM GRANULOCYTES NFR BLD AUTO: 0.2 % — SIGNIFICANT CHANGE UP (ref 0–0.9)
INR BLD: 0.96 RATIO — SIGNIFICANT CHANGE UP (ref 0.85–1.18)
LYMPHOCYTES # BLD AUTO: 0.99 K/UL — LOW (ref 1–3.3)
LYMPHOCYTES # BLD AUTO: 17.2 % — SIGNIFICANT CHANGE UP (ref 13–44)
MCHC RBC-ENTMCNC: 32.1 PG — SIGNIFICANT CHANGE UP (ref 27–34)
MCHC RBC-ENTMCNC: 33.2 GM/DL — SIGNIFICANT CHANGE UP (ref 32–36)
MCV RBC AUTO: 96.7 FL — SIGNIFICANT CHANGE UP (ref 80–100)
MONOCYTES # BLD AUTO: 0.6 K/UL — SIGNIFICANT CHANGE UP (ref 0–0.9)
MONOCYTES NFR BLD AUTO: 10.4 % — SIGNIFICANT CHANGE UP (ref 2–14)
NEUTROPHILS # BLD AUTO: 4.04 K/UL — SIGNIFICANT CHANGE UP (ref 1.8–7.4)
NEUTROPHILS NFR BLD AUTO: 70.3 % — SIGNIFICANT CHANGE UP (ref 43–77)
NRBC # BLD: 0 /100 WBCS — SIGNIFICANT CHANGE UP (ref 0–0)
PLATELET # BLD AUTO: 190 K/UL — SIGNIFICANT CHANGE UP (ref 150–400)
POTASSIUM SERPL-MCNC: 4.4 MMOL/L — SIGNIFICANT CHANGE UP (ref 3.5–5.3)
POTASSIUM SERPL-SCNC: 4.4 MMOL/L — SIGNIFICANT CHANGE UP (ref 3.5–5.3)
PROT SERPL-MCNC: 7.4 G/DL — SIGNIFICANT CHANGE UP (ref 6–8.3)
PROTHROM AB SERPL-ACNC: 10.6 SEC — SIGNIFICANT CHANGE UP (ref 9.5–13)
RBC # BLD: 4.18 M/UL — LOW (ref 4.2–5.8)
RBC # FLD: 13.5 % — SIGNIFICANT CHANGE UP (ref 10.3–14.5)
SODIUM SERPL-SCNC: 133 MMOL/L — LOW (ref 135–145)
TROPONIN T, HIGH SENSITIVITY RESULT: 10 NG/L — SIGNIFICANT CHANGE UP (ref 0–51)
TROPONIN T, HIGH SENSITIVITY RESULT: 10 NG/L — SIGNIFICANT CHANGE UP (ref 0–51)
TROPONIN T, HIGH SENSITIVITY RESULT: 18 NG/L — SIGNIFICANT CHANGE UP (ref 0–51)
WBC # BLD: 5.75 K/UL — SIGNIFICANT CHANGE UP (ref 3.8–10.5)
WBC # FLD AUTO: 5.75 K/UL — SIGNIFICANT CHANGE UP (ref 3.8–10.5)

## 2024-08-27 PROCEDURE — 93000 ELECTROCARDIOGRAM COMPLETE: CPT | Mod: PD

## 2024-08-27 PROCEDURE — 99215 OFFICE O/P EST HI 40 MIN: CPT

## 2024-08-27 PROCEDURE — 71275 CT ANGIOGRAPHY CHEST: CPT | Mod: 26

## 2024-08-27 PROCEDURE — 71045 X-RAY EXAM CHEST 1 VIEW: CPT | Mod: 26

## 2024-08-27 PROCEDURE — 99285 EMERGENCY DEPT VISIT HI MDM: CPT

## 2024-08-27 PROCEDURE — 99223 1ST HOSP IP/OBS HIGH 75: CPT | Mod: GC

## 2024-08-27 PROCEDURE — 74174 CTA ABD&PLVS W/CONTRAST: CPT | Mod: 26

## 2024-08-27 RX ORDER — FAMOTIDINE 10 MG/ML
20 INJECTION INTRAVENOUS ONCE
Refills: 0 | Status: DISCONTINUED | OUTPATIENT
Start: 2024-08-27 | End: 2024-08-27

## 2024-08-27 RX ORDER — PANTOPRAZOLE SODIUM 40 MG
40 TABLET, DELAYED RELEASE (ENTERIC COATED) ORAL
Refills: 0 | Status: DISCONTINUED | OUTPATIENT
Start: 2024-08-27 | End: 2024-09-01

## 2024-08-27 RX ORDER — MAGNESIUM, ALUMINUM HYDROXIDE 200-225/5
30 SUSPENSION, ORAL (FINAL DOSE FORM) ORAL ONCE
Refills: 0 | Status: COMPLETED | OUTPATIENT
Start: 2024-08-27 | End: 2024-08-27

## 2024-08-27 RX ORDER — ACETAMINOPHEN 325 MG/1
1000 TABLET ORAL ONCE
Refills: 0 | Status: COMPLETED | OUTPATIENT
Start: 2024-08-27 | End: 2024-08-27

## 2024-08-27 RX ORDER — AMLODIPINE BESYLATE 10 MG/1
2.5 TABLET ORAL DAILY
Refills: 0 | Status: DISCONTINUED | OUTPATIENT
Start: 2024-08-27 | End: 2024-09-01

## 2024-08-27 RX ORDER — LATANOPROST 50 UG/ML
1 SOLUTION OPHTHALMIC AT BEDTIME
Refills: 0 | Status: DISCONTINUED | OUTPATIENT
Start: 2024-08-27 | End: 2024-09-01

## 2024-08-27 RX ORDER — FENTANYL CITRATE 50 UG/ML
50 INJECTION INTRAMUSCULAR; INTRAVENOUS ONCE
Refills: 0 | Status: DISCONTINUED | OUTPATIENT
Start: 2024-08-27 | End: 2024-08-27

## 2024-08-27 RX ORDER — TAMSULOSIN HYDROCHLORIDE 0.4 MG/1
0.4 CAPSULE ORAL AT BEDTIME
Refills: 0 | Status: DISCONTINUED | OUTPATIENT
Start: 2024-08-27 | End: 2024-09-01

## 2024-08-27 RX ORDER — ASPIRIN 81 MG
324 TABLET, DELAYED RELEASE (ENTERIC COATED) ORAL ONCE
Refills: 0 | Status: COMPLETED | OUTPATIENT
Start: 2024-08-27 | End: 2024-08-27

## 2024-08-27 RX ORDER — FLUTICASONE PROPIONATE 50 UG/1
2 SPRAY, METERED NASAL EVERY 12 HOURS
Refills: 0 | Status: DISCONTINUED | OUTPATIENT
Start: 2024-08-27 | End: 2024-09-01

## 2024-08-27 RX ORDER — LOSARTAN POTASSIUM 50 MG/1
25 TABLET ORAL DAILY
Refills: 0 | Status: DISCONTINUED | OUTPATIENT
Start: 2024-08-27 | End: 2024-09-01

## 2024-08-27 RX ORDER — FINASTERIDE 1 MG/1
5 TABLET, FILM COATED ORAL DAILY
Refills: 0 | Status: DISCONTINUED | OUTPATIENT
Start: 2024-08-27 | End: 2024-09-01

## 2024-08-27 RX ORDER — SULFAMETHOXAZOLE AND TRIMETHOPRIM 800; 160 MG/1; MG/1
1 TABLET ORAL
Refills: 0 | Status: DISCONTINUED | OUTPATIENT
Start: 2024-08-27 | End: 2024-09-01

## 2024-08-27 RX ORDER — FAMOTIDINE 10 MG/ML
20 INJECTION INTRAVENOUS ONCE
Refills: 0 | Status: COMPLETED | OUTPATIENT
Start: 2024-08-27 | End: 2024-08-27

## 2024-08-27 RX ADMIN — ACETAMINOPHEN 1000 MILLIGRAM(S): 325 TABLET ORAL at 23:42

## 2024-08-27 RX ADMIN — Medication 40 MILLIGRAM(S): at 21:07

## 2024-08-27 RX ADMIN — ACETAMINOPHEN 400 MILLIGRAM(S): 325 TABLET ORAL at 16:30

## 2024-08-27 RX ADMIN — Medication 324 MILLIGRAM(S): at 21:08

## 2024-08-27 RX ADMIN — SULFAMETHOXAZOLE AND TRIMETHOPRIM 1 TABLET(S): 800; 160 TABLET ORAL at 23:23

## 2024-08-27 RX ADMIN — ACETAMINOPHEN 1000 MILLIGRAM(S): 325 TABLET ORAL at 16:49

## 2024-08-27 RX ADMIN — FENTANYL CITRATE 50 MICROGRAM(S): 50 INJECTION INTRAMUSCULAR; INTRAVENOUS at 18:20

## 2024-08-27 RX ADMIN — ACETAMINOPHEN 400 MILLIGRAM(S): 325 TABLET ORAL at 23:12

## 2024-08-27 RX ADMIN — FAMOTIDINE 20 MILLIGRAM(S): 10 INJECTION INTRAVENOUS at 18:25

## 2024-08-27 RX ADMIN — Medication 30 MILLILITER(S): at 18:20

## 2024-08-27 NOTE — ED ADULT NURSE NOTE - OBJECTIVE STATEMENT
84y m pt c/o chest pain; has been happening for weeks; this morning pain was worse; pt describes as substernal, non radiating, episodic; started at 5am; happened this am while walking to bathroom; called dr bond and was sent to ed for a nuclear stress test vs CTC; pt denies pain at this time; pt being trtd for uti; pt on cefpodox; aox3 no resp distress; no sob; abd pain; no n/v/d; denies fever/chills; no cough/congestion; ekg done; pt placed on cardiac monitor in nsr; iv placed; labs drawn/sent; wife at bedside;

## 2024-08-27 NOTE — H&P ADULT - NSICDXPASTMEDICALHX_GEN_ALL_CORE_FT
PAST MEDICAL HISTORY:  GERD (gastroesophageal reflux disease)     Glaucoma     History of BPH     History of osteoarthritis     Mild HTN     PND (post-nasal drip)     Shingles

## 2024-08-27 NOTE — H&P ADULT - PROBLEM SELECTOR PLAN 2
E. Coli UTI sensitive to Bactrim and Ciprofloxacin    PLAN:  -c/w Bactrim DS BID as long as patient is still eating per Urology E. Coli UTI sensitive to Bactrim and Ciprofloxacin    PLAN:  -c/w Bactrim DS BID as long as patient is still eating per Urology (if NPO see note)

## 2024-08-27 NOTE — H&P ADULT - NSHPPHYSICALEXAM_GEN_ALL_CORE
Vital Signs Last 24 Hrs  T(C): 36.8 (08-27-24 @ 21:35), Max: 36.8 (08-27-24 @ 21:35)  T(F): 98.2 (08-27-24 @ 21:35), Max: 98.2 (08-27-24 @ 21:35)  HR: 60 (08-27-24 @ 21:35) (50 - 67)  BP: 167/72 (08-27-24 @ 21:35) (135/60 - 167/72)  BP(mean): --  RR: 18 (08-27-24 @ 21:35) (17 - 20)  SpO2: 99% (08-27-24 @ 21:35) (97% - 99%)

## 2024-08-27 NOTE — ED PROVIDER NOTE - CLINICAL SUMMARY MEDICAL DECISION MAKING FREE TEXT BOX
84-year-old male past medical history of hypertension, GERD presents emergency department due to episodic chest pain.  Is been occurring for the past 2 to 3 weeks but today at 5 AM he had his worst episode that occurred soon as he woke up.  The pain radiated to his neck on the left side and slightly into his left arm. Called EMS but decided to follow-up with his outpatient provider Dr. Gomez.  His doctor told him that he should come to the emergency department and get a CT coronary here.  He was told to have ED team contact Dr. Traylor of cardiology.    Patient was seen while in waiting room by PA with initial labs and orders placed. 84-year-old male past medical history of hypertension, GERD presents emergency department due to episodic chest pain.  Is been occurring for the past 2 to 3 weeks but today at 5 AM he had his worst episode that occurred soon as he woke up.  The pain radiated to his neck on the left side and slightly into his left arm. Called EMS but decided to follow-up with his outpatient provider Dr. Gomez.  His doctor told him that he should come to the emergency department and get a CT coronary here.  He was told to have ED team contact Dr. Traylor of cardiology.    Patient was seen while in waiting room by qPA with initial labs and orders placed. Chest x-ray showed clear lungs with no pneumothorax.  Initial troponin was found to be 10.  Will trend this with repeat troponin here in the emergency department.  Will reach out to Dr. Traylor to discuss patient case and plan.

## 2024-08-27 NOTE — ED PROVIDER NOTE - ATTENDING CONTRIBUTION TO CARE
Presenting with chest pain (burning) for a few days, worse today. Low concern for PE (no tachycardia, hypoxemia, tachypnea), dissection (pulses bilaterally, no radiation to back, not hypertensive). Will check labs, trop, EKG, CTA coronaries (and to evaluate for dissection to screen for aortic pathology), TBA.

## 2024-08-27 NOTE — H&P ADULT - ATTENDING COMMENTS
I have reviewed the labs, imaging and ekg. EKG with NSR HR 66 QTc 434 ST depressions in III, V4, V5. CXR w/o focal consolidations    83 y/o M with PMHx of HTN, GERD, BPH, presenting to the ER with sub-sternal progressively worsening intermittent chest pain x3 weeks sent in from PMD office. Follows with Dr. Gomez for Cardiology. Note relatively normal TTE February this year. Trop neg x2, slightly up trending on 3rd but overall normal. Note EKG findings. Concern for unstable angina. CTA chest w/o dissection or other findings. Also, UTI as per urology.  -Cont. to trend cardiac enzymes  -Telemetry  -F/u Cardiology/ Dr. Traylor recommendations, stress test vs CTC  -Cont. asa and statin for now  -Will order repeat TTE for now  -Lipid profile and A1c  -Can start Bactrim, f/u urology recommendations  -Cont. other home meds  -DVT PPx, SCDs

## 2024-08-27 NOTE — ED PROVIDER NOTE - RAPID ASSESSMENT
83 yo M with a PMH of HTN, BPH p/w substernal non radiation episodic chest pain at 5 AM. States pain is mostly with exertion. This morning experienced pain when he walked back from the bathroom. Has had on/off for weeks but was concerned today that severity was much worse than normal. Called his Cardiologist, Dr. Gomez who sent pt in for "nuc stress vs CTC." Rx from Cards recommended pt be seen by Dr. Traylor while being evaluated at [facility]. No chest pain currently. Of note, pt currently being tx for UTI, dx yesterday. Is on cefpodox.   PMD Veronica  Urology Dr Dozier      **Patient was rapidly assessed in triage by me, Eitan Berkowitz PA-C. A limited history was obtained. The patient will be seen and further examined and worked up in the main ED and their care will be completed by the main ED team. Receiving team will follow up on labs, medications, any clinical imaging, and perform reassessment and disposition of the patient as clinically indicated. All decisions regarding the progression of care will be made at their discretion.

## 2024-08-27 NOTE — H&P ADULT - ASSESSMENT
Pt is an 85 y/o M with PMHx of HTN, GERD, BPH, presenting to the ER with sub-sternal progressively worsening intermittent chest pain x3 weeks, sent by cardiologist for "nuc stress vs CTC"  EKG x2 without ST Elevations, Troponin 10->10->18, CKMB 2.0, CTA no aortic dissection or PE, CXR insignificant. S/p famotidine, maalox, ofirmev x 2, fentanyl 50mcg , atorvastatin 40, 6/10 pain at rest after receiving pain medications.    Admitted to medicine for likely stress test / CT coronary study. Differential diagnosis: Angina vs ACS vs GERD exacerbation.

## 2024-08-27 NOTE — ED PROVIDER NOTE - PHYSICAL EXAMINATION
Gen: No acute distress  HEENT: EOMI, no nasal discharge  CV: RRR, +S1/S2, 2+ radial pulses b/l  Resp: CTAB, no W/R/R, no accessory muscle use, no increased work of breathing  GI: Abdomen soft non-distended, No tenderness to palpation.   MSK: No open wounds, no bruising, no LE edema  Neuro: A&Ox4, following commands, moving all four extremities spontaneously  Psych: appropriate mood

## 2024-08-27 NOTE — ED PROVIDER NOTE - OBJECTIVE STATEMENT
84-year-old male past medical history of hypertension, GERD presents emergency department due to episodic chest pain.  Is been occurring for the past 2 to 3 weeks but today at 5 AM he had his worst episode that occurred soon as he woke up.  The pain radiated to his neck on the left side and slightly into his left arm.  Wife called EMS who presented on the scene but when they got there he had no further symptoms and was feeling asymptomatic.  EMS said vitals were fine and that he could either go to the hospital or follow up outpatient.  After being seen outpatient by Dr. Gomez his cardiologist he was told to come to the emergency department for CT coronaries.  Patient is not sure what makes the pain better or worse because he says that it just occurs randomly now and even occurs while he is laying down.  He is currently being treated for a UTI with cefpodoxime but is not complaining of any further symptoms.  Patient denies fever, chills, shortness of breath, change in bowels, abdominal pain, headache.

## 2024-08-27 NOTE — H&P ADULT - TIME BILLING
Need to interview and examine patient, discuss care with family, provide counseling, coordinate care, place orders, document, personally review imaging, ekg and review prior medical records. 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

## 2024-08-27 NOTE — ED PROVIDER NOTE - PROGRESS NOTE DETAILS
Attempted to reach out to Dr. Traylor of cardiology without response. Discussed case with Dr. Traylor who would like patient to be admitted for stress test and said CT coronaries was good test as well. Will admit to medicine for stress test.

## 2024-08-27 NOTE — H&P ADULT - HISTORY OF PRESENT ILLNESS
Pt is an 83 y/o M with PMHx of HTN, GERD, BPH, presenting to the ER with chest pain. For the past 2-3 weeks, the patient has had 2-3 episodes of sub-sternal burning chest pain that last <15 minutes and are mild intensity in pain; most recently it occurred last night while walking 3/10 in intensity. At 5AM, the patient was in bed when he began experiencing sudden 10/10 sub-sternal chest pain radiating to the jaw and left arm. EMS was called and they performed an EKG which was "normal" and he elected to go to his physician Dr. Gomez. He performed an EKG and advised him to go to the emergency room for "nuc stress vs CTC". Throughout the day, he has been having this episodic chest pain that can now last up to 1 hour and at rest with associated chills and nausea. Pt states that he can normally tolerate extended activity and he goes swimming.     ROS: Denies fevers, chills, nausea, vomiting, diarrhea, shortness of breath, headache, recent colds, recent travel (went to [place] last summer).    ED Course: EKG x2 without ST Elevations, Troponin 10->10->18, CKMB 2.0, CTA no aortic dissection or PE, CXR insignificant. S/p famotidine, maalox, ofirmev x 2, fentanyl 50mcg , atorvastatin 40, 6/10 pain at rest after receiving pain medications.    PMHx:  HTN  GERD  BPH  Glaucoma  UTI currently    Meds:   Amlodipine 2.5 mg TID  Losartan Potassium 25 mg QD  Finasteride 5 mg QD  Tamsulosin HCl 0.4 mg QD at bedtime  Omeprazole 20 mg QD  Lumigan 0.01% 1 drop in each eye at bedtime  Rhopressa Opth 0.02% 1 drop in each eye at bedtime  Flonase 2 sprays in each nostril QD  Cefpodoxime 200 mg QD - current UTI    Allergies: NKDA    Surgical Hx:  Surgery for "colon blockage"  R Hip replacement    Social Hx:  Ex-smoker (60 years prior for 3-4 years), denies alcohol use, denies illicit drug use.  Dr. Jaime Barrett - [phone]  Dr. Roberto Gomez - Cardiologist [phone]  Dr. Emanuel Dozier - Urologist [phone]  Dr. Thomas Thompson - Glaucoma specialist [phone]  Emergency Contact - Dahlia Fofana [phone]      MEDICATIONS  (STANDING):  trimethoprim  160 mG/sulfamethoxazole 800 mG 1 Tablet(s) Oral two times a day    MEDICATIONS  (PRN):    PHYSICAL EXAM:  Vital Signs Last 24 Hrs  T(C): 36.8 (27 Aug 2024 21:35), Max: 36.8 (27 Aug 2024 21:35)  T(F): 98.2 (27 Aug 2024 21:35), Max: 98.2 (27 Aug 2024 21:35)  HR: 60 (27 Aug 2024 21:35) (50 - 67)  BP: 167/72 (27 Aug 2024 21:35) (135/60 - 167/72)  BP(mean): --  RR: 18 (27 Aug 2024 21:35) (17 - 20)  SpO2: 99% (27 Aug 2024 21:35) (97% - 99%)    Parameters below as of 27 Aug 2024 21:35  Patient On (Oxygen Delivery Method): room air      CAPILLARY BLOOD GLUCOSE      I&O's Summary    CONSTITUTIONAL: NAD  HEENT: NC/AT  RESPIRATORY: Normal respiratory effort; lungs are clear to auscultation bilaterally  CARDIOVASCULAR: Regular rate and rhythm, normal S1 and S2, no murmur/rub/gallop; No lower extremity edema; Peripheral pulses are 2+ bilaterally  ABDOMEN: Nontender to palpation, normoactive bowel sounds, no rebound/guarding; No hepatosplenomegaly  MUSCLOSKELETAL: no clubbing or cyanosis of digits; no joint swelling or tenderness to palpation  EXTREMITIES: no peripheral edema, distal pulses intact   NEURO: no focal deficits   PSYCH: A+O to person, place, and time; affect appropriate    LABS:                        13.4   5.75  )-----------( 190      ( 27 Aug 2024 13:24 )             40.4     08-27    133<L>  |  101  |  20  ----------------------------<  110<H>  4.4   |  20<L>  |  0.96    Ca    10.0      27 Aug 2024 13:24    TPro  7.4  /  Alb  4.0  /  TBili  0.5  /  DBili  x   /  AST  21  /  ALT  27  /  AlkPhos  116  08-27    PT/INR - ( 27 Aug 2024 13:24 )   PT: 10.6 sec;   INR: 0.96 ratio         PTT - ( 27 Aug 2024 13:24 )  PTT:28.0 sec  CARDIAC MARKERS ( 27 Aug 2024 20:58 )  x     / x     / x     / x     / 2.0 ng/mL      Urinalysis Basic - ( 27 Aug 2024 13:24 )    Color: x / Appearance: x / SG: x / pH: x  Gluc: 110 mg/dL / Ketone: x  / Bili: x / Urobili: x   Blood: x / Protein: x / Nitrite: x   Leuk Esterase: x / RBC: x / WBC x   Sq Epi: x / Non Sq Epi: x / Bacteria: x      IMAGING:    [X] All pertinent imaging reviewed by me

## 2024-08-27 NOTE — H&P ADULT - PROBLEM SELECTOR PLAN 1
EKG x2 without ST Elevations, Troponin 10->10->18, CKMB 2.0, CTA no aortic dissection or PE, CXR insignificant. S/p famotidine, maalox, ofirmev x 2, fentanyl 50mcg , atorvastatin 40, 6/10 pain at rest after receiving pain medications.    PLAN:  -Troponin trend Q6  -c/w standing tylenol Q6H  -f/u cardiology recommendations EKG x2 without ST Elevations, Troponin 10->10->18, CKMB 2.0, CTA no aortic dissection or PE, CXR insignificant. S/p famotidine, maalox, ofirmev x 2, fentanyl 50mcg , atorvastatin 40, 6/10 pain at rest after receiving pain medications.    PLAN:  -Troponin trend Q6  -c/w standing tylenol Q6H  -f/u cardiology recommendations  -f/u TTE EKG x2 without ST Elevations, Troponin 10->10->18, CKMB 2.0, CTA no aortic dissection or PE, CXR insignificant. S/p famotidine, maalox, ofirmev x 2, fentanyl 50mcg , atorvastatin 40, 6/10 pain at rest after receiving pain medications.    PLAN:  -Troponin trend Q6  -c/w standing tylenol Q6H  -c/w ASA and statin  -f/u lipid profile  -f/u A1C  -f/u cardiology recommendations  -f/u TTE

## 2024-08-28 ENCOUNTER — RESULT REVIEW (OUTPATIENT)
Age: 84
End: 2024-08-28

## 2024-08-28 PROBLEM — R07.89 CHEST PRESSURE: Status: ACTIVE | Noted: 2024-08-27

## 2024-08-28 LAB
A1C WITH ESTIMATED AVERAGE GLUCOSE RESULT: 5.4 % — SIGNIFICANT CHANGE UP (ref 4–5.6)
ALBUMIN SERPL ELPH-MCNC: 3.8 G/DL — SIGNIFICANT CHANGE UP (ref 3.3–5)
ALP SERPL-CCNC: 112 U/L — SIGNIFICANT CHANGE UP (ref 40–120)
ALT FLD-CCNC: 22 U/L — SIGNIFICANT CHANGE UP (ref 10–45)
ANION GAP SERPL CALC-SCNC: 11 MMOL/L — SIGNIFICANT CHANGE UP (ref 5–17)
APTT BLD: 26.9 SEC — SIGNIFICANT CHANGE UP (ref 24.5–35.6)
APTT BLD: 43.9 SEC — HIGH (ref 24.5–35.6)
APTT BLD: 76 SEC — HIGH (ref 24.5–35.6)
AST SERPL-CCNC: 25 U/L — SIGNIFICANT CHANGE UP (ref 10–40)
BASOPHILS # BLD AUTO: 0.03 K/UL — SIGNIFICANT CHANGE UP (ref 0–0.2)
BASOPHILS NFR BLD AUTO: 0.3 % — SIGNIFICANT CHANGE UP (ref 0–2)
BILIRUB SERPL-MCNC: 0.3 MG/DL — SIGNIFICANT CHANGE UP (ref 0.2–1.2)
BUN SERPL-MCNC: 16 MG/DL — SIGNIFICANT CHANGE UP (ref 7–23)
CALCIUM SERPL-MCNC: 9.8 MG/DL — SIGNIFICANT CHANGE UP (ref 8.4–10.5)
CHLORIDE SERPL-SCNC: 102 MMOL/L — SIGNIFICANT CHANGE UP (ref 96–108)
CHOLEST SERPL-MCNC: 160 MG/DL — SIGNIFICANT CHANGE UP
CK MB CFR SERPL CALC: 15.2 NG/ML — HIGH (ref 0–6.7)
CO2 SERPL-SCNC: 23 MMOL/L — SIGNIFICANT CHANGE UP (ref 22–31)
CREAT SERPL-MCNC: 1.03 MG/DL — SIGNIFICANT CHANGE UP (ref 0.5–1.3)
EGFR: 72 ML/MIN/1.73M2 — SIGNIFICANT CHANGE UP
EOSINOPHIL # BLD AUTO: 0.21 K/UL — SIGNIFICANT CHANGE UP (ref 0–0.5)
EOSINOPHIL NFR BLD AUTO: 2.4 % — SIGNIFICANT CHANGE UP (ref 0–6)
ESTIMATED AVERAGE GLUCOSE: 108 MG/DL — SIGNIFICANT CHANGE UP (ref 68–114)
GLUCOSE SERPL-MCNC: 91 MG/DL — SIGNIFICANT CHANGE UP (ref 70–99)
HCT VFR BLD CALC: 38.6 % — LOW (ref 39–50)
HCT VFR BLD CALC: 39.8 % — SIGNIFICANT CHANGE UP (ref 39–50)
HDLC SERPL-MCNC: 50 MG/DL — SIGNIFICANT CHANGE UP
HGB BLD-MCNC: 13 G/DL — SIGNIFICANT CHANGE UP (ref 13–17)
HGB BLD-MCNC: 13.1 G/DL — SIGNIFICANT CHANGE UP (ref 13–17)
IMM GRANULOCYTES NFR BLD AUTO: 0.3 % — SIGNIFICANT CHANGE UP (ref 0–0.9)
INR BLD: 0.98 RATIO — SIGNIFICANT CHANGE UP (ref 0.85–1.18)
LIPID PNL WITH DIRECT LDL SERPL: 92 MG/DL — SIGNIFICANT CHANGE UP
LYMPHOCYTES # BLD AUTO: 1.68 K/UL — SIGNIFICANT CHANGE UP (ref 1–3.3)
LYMPHOCYTES # BLD AUTO: 19.6 % — SIGNIFICANT CHANGE UP (ref 13–44)
MAGNESIUM SERPL-MCNC: 2.4 MG/DL — SIGNIFICANT CHANGE UP (ref 1.6–2.6)
MCHC RBC-ENTMCNC: 31.2 PG — SIGNIFICANT CHANGE UP (ref 27–34)
MCHC RBC-ENTMCNC: 31.9 PG — SIGNIFICANT CHANGE UP (ref 27–34)
MCHC RBC-ENTMCNC: 32.9 GM/DL — SIGNIFICANT CHANGE UP (ref 32–36)
MCHC RBC-ENTMCNC: 33.7 GM/DL — SIGNIFICANT CHANGE UP (ref 32–36)
MCV RBC AUTO: 94.6 FL — SIGNIFICANT CHANGE UP (ref 80–100)
MCV RBC AUTO: 94.8 FL — SIGNIFICANT CHANGE UP (ref 80–100)
MONOCYTES # BLD AUTO: 0.86 K/UL — SIGNIFICANT CHANGE UP (ref 0–0.9)
MONOCYTES NFR BLD AUTO: 10 % — SIGNIFICANT CHANGE UP (ref 2–14)
NEUTROPHILS # BLD AUTO: 5.78 K/UL — SIGNIFICANT CHANGE UP (ref 1.8–7.4)
NEUTROPHILS NFR BLD AUTO: 67.4 % — SIGNIFICANT CHANGE UP (ref 43–77)
NON HDL CHOLESTEROL: 110 MG/DL — SIGNIFICANT CHANGE UP
NRBC # BLD: 0 /100 WBCS — SIGNIFICANT CHANGE UP (ref 0–0)
NRBC # BLD: 0 /100 WBCS — SIGNIFICANT CHANGE UP (ref 0–0)
PHOSPHATE SERPL-MCNC: 2.3 MG/DL — LOW (ref 2.5–4.5)
PLATELET # BLD AUTO: 194 K/UL — SIGNIFICANT CHANGE UP (ref 150–400)
PLATELET # BLD AUTO: 205 K/UL — SIGNIFICANT CHANGE UP (ref 150–400)
POTASSIUM SERPL-MCNC: 4 MMOL/L — SIGNIFICANT CHANGE UP (ref 3.5–5.3)
POTASSIUM SERPL-SCNC: 4 MMOL/L — SIGNIFICANT CHANGE UP (ref 3.5–5.3)
PROT SERPL-MCNC: 6.9 G/DL — SIGNIFICANT CHANGE UP (ref 6–8.3)
PROTHROM AB SERPL-ACNC: 10.8 SEC — SIGNIFICANT CHANGE UP (ref 9.5–13)
RBC # BLD: 4.08 M/UL — LOW (ref 4.2–5.8)
RBC # BLD: 4.2 M/UL — SIGNIFICANT CHANGE UP (ref 4.2–5.8)
RBC # FLD: 13.5 % — SIGNIFICANT CHANGE UP (ref 10.3–14.5)
RBC # FLD: 13.5 % — SIGNIFICANT CHANGE UP (ref 10.3–14.5)
SODIUM SERPL-SCNC: 136 MMOL/L — SIGNIFICANT CHANGE UP (ref 135–145)
TRIGL SERPL-MCNC: 96 MG/DL — SIGNIFICANT CHANGE UP
TROPONIN T, HIGH SENSITIVITY RESULT: 242 NG/L — HIGH (ref 0–51)
WBC # BLD: 8.41 K/UL — SIGNIFICANT CHANGE UP (ref 3.8–10.5)
WBC # BLD: 8.59 K/UL — SIGNIFICANT CHANGE UP (ref 3.8–10.5)
WBC # FLD AUTO: 8.41 K/UL — SIGNIFICANT CHANGE UP (ref 3.8–10.5)
WBC # FLD AUTO: 8.59 K/UL — SIGNIFICANT CHANGE UP (ref 3.8–10.5)

## 2024-08-28 PROCEDURE — 99233 SBSQ HOSP IP/OBS HIGH 50: CPT

## 2024-08-28 PROCEDURE — 93306 TTE W/DOPPLER COMPLETE: CPT | Mod: 26

## 2024-08-28 RX ORDER — ASPIRIN 81 MG
81 TABLET, DELAYED RELEASE (ENTERIC COATED) ORAL DAILY
Refills: 0 | Status: DISCONTINUED | OUTPATIENT
Start: 2024-08-28 | End: 2024-08-29

## 2024-08-28 RX ORDER — HEPARIN SODIUM,BOVINE 1000/ML
4100 VIAL (ML) INJECTION EVERY 6 HOURS
Refills: 0 | Status: DISCONTINUED | OUTPATIENT
Start: 2024-08-28 | End: 2024-08-29

## 2024-08-28 RX ORDER — HEPARIN SODIUM,BOVINE 1000/ML
VIAL (ML) INJECTION
Qty: 25000 | Refills: 0 | Status: DISCONTINUED | OUTPATIENT
Start: 2024-08-28 | End: 2024-08-29

## 2024-08-28 RX ORDER — SODIUM PHOSPHATE, DIBASIC, ANHYDROUS, POTASSIUM PHOSPHATE, MONOBASIC, AND SODIUM PHOSPHATE, MONOBASIC, MONOHYDRATE 852; 155; 130 MG/1; MG/1; MG/1
1 TABLET, COATED ORAL ONCE
Refills: 0 | Status: COMPLETED | OUTPATIENT
Start: 2024-08-28 | End: 2024-08-28

## 2024-08-28 RX ADMIN — Medication 600 MILLIGRAM(S): at 09:58

## 2024-08-28 RX ADMIN — Medication 950 UNIT(S)/HR: at 19:00

## 2024-08-28 RX ADMIN — LOSARTAN POTASSIUM 25 MILLIGRAM(S): 50 TABLET ORAL at 05:22

## 2024-08-28 RX ADMIN — Medication 900 UNIT(S)/HR: at 23:11

## 2024-08-28 RX ADMIN — Medication 950 UNIT(S)/HR: at 16:54

## 2024-08-28 RX ADMIN — LATANOPROST 1 DROP(S): 50 SOLUTION OPHTHALMIC at 21:13

## 2024-08-28 RX ADMIN — TAMSULOSIN HYDROCHLORIDE 0.4 MILLIGRAM(S): 0.4 CAPSULE ORAL at 21:12

## 2024-08-28 RX ADMIN — Medication 81 MILLIGRAM(S): at 12:24

## 2024-08-28 RX ADMIN — FINASTERIDE 5 MILLIGRAM(S): 1 TABLET, FILM COATED ORAL at 12:24

## 2024-08-28 RX ADMIN — SODIUM PHOSPHATE, DIBASIC, ANHYDROUS, POTASSIUM PHOSPHATE, MONOBASIC, AND SODIUM PHOSPHATE, MONOBASIC, MONOHYDRATE 1 PACKET(S): 852; 155; 130 TABLET, COATED ORAL at 21:12

## 2024-08-28 RX ADMIN — SULFAMETHOXAZOLE AND TRIMETHOPRIM 1 TABLET(S): 800; 160 TABLET ORAL at 17:53

## 2024-08-28 RX ADMIN — AMLODIPINE BESYLATE 2.5 MILLIGRAM(S): 10 TABLET ORAL at 05:22

## 2024-08-28 RX ADMIN — Medication 800 UNIT(S)/HR: at 09:21

## 2024-08-28 RX ADMIN — Medication 40 MILLIGRAM(S): at 05:22

## 2024-08-28 RX ADMIN — FLUTICASONE PROPIONATE 2 SPRAY(S): 50 SPRAY, METERED NASAL at 05:24

## 2024-08-28 RX ADMIN — Medication 40 MILLIGRAM(S): at 21:12

## 2024-08-28 RX ADMIN — SULFAMETHOXAZOLE AND TRIMETHOPRIM 1 TABLET(S): 800; 160 TABLET ORAL at 05:21

## 2024-08-28 NOTE — PROGRESS NOTE ADULT - SUBJECTIVE AND OBJECTIVE BOX
Risa Roldan MD  Division of Hospital Medicine  Please contact via MS Teams (prefer message first)  Office: [phone]    Patient is a 84y old  Male who presents with a chief complaint of chest pain (27 Aug 2024 23:09)      SUBJECTIVE / OVERNIGHT EVENTS:  no acute events overnight, vss, afebrile  pt feels okay this morning  trop trended up from 18 to 242 overnight, plan for [facility]  denies active chest pain at this time    ROS:  14 point ROS negative in detail except stated as above    MEDICATIONS  (STANDING):  amLODIPine   Tablet 2.5 milliGRAM(s) Oral daily  aspirin  chewable 81 milliGRAM(s) Oral daily  atorvastatin 40 milliGRAM(s) Oral at bedtime  finasteride 5 milliGRAM(s) Oral daily  fluticasone propionate 50 MICROgram(s)/spray Nasal Spray 2 Spray(s) Both Nostrils every 12 hours  heparin  Infusion.  Unit(s)/Hr (8 mL/Hr) IV Continuous <Continuous>  latanoprost 0.005% Ophthalmic Solution 1 Drop(s) Both EYES at bedtime  losartan 25 milliGRAM(s) Oral daily  pantoprazole    Tablet 40 milliGRAM(s) Oral before breakfast  tamsulosin 0.4 milliGRAM(s) Oral at bedtime  trimethoprim  160 mG/sulfamethoxazole 800 mG 1 Tablet(s) Oral two times a day    MEDICATIONS  (PRN):  heparin   Injectable 4100 Unit(s) IV Push every 6 hours PRN For aPTT less than 40      CAPILLARY BLOOD GLUCOSE        I&O's Summary      PHYSICAL EXAM:  Vital Signs Last 24 Hrs  T(C): 36.9 (28 Aug 2024 05:15), Max: 36.9 (28 Aug 2024 05:15)  T(F): 98.4 (28 Aug 2024 05:15), Max: 98.4 (28 Aug 2024 05:15)  HR: 47 (28 Aug 2024 05:15) (47 - 67)  BP: 114/63 (28 Aug 2024 05:15) (114/63 - 167/72)  BP(mean): --  RR: 18 (28 Aug 2024 05:15) (17 - 20)  SpO2: 96% (28 Aug 2024 05:15) (96% - 99%)    Parameters below as of 28 Aug 2024 05:15  Patient On (Oxygen Delivery Method): room air      GENERAL: NAD, well-developed  HEAD:  Atraumatic, Normocephalic  EYES: EOMI, PERRLA, conjunctiva and sclera clear  NECK: Supple, No JVD  CHEST/LUNG: Clear to auscultation bilaterally; No wheeze  HEART: Regular rate and rhythm; No murmurs, rubs, or gallops  ABDOMEN: Soft, Nontender, Nondistended; Bowel sounds present  EXTREMITIES:  2+ Peripheral Pulses, No clubbing, cyanosis, or edema  NEUROLOGY: AAOx3; non-focal  SKIN: No rashes or lesions    LABS:                        13.0   8.59  )-----------( 205      ( 28 Aug 2024 05:58 )             38.6     08-28    136  |  102  |  16  ----------------------------<  91  4.0   |  23  |  1.03    Ca    9.8      28 Aug 2024 05:58  Phos  2.3     08-28  Mg     2.4     08-28    TPro  6.9  /  Alb  3.8  /  TBili  0.3  /  DBili  x   /  AST  25  /  ALT  22  /  AlkPhos  112  08-28    PT/INR - ( 28 Aug 2024 05:58 )   PT: 10.8 sec;   INR: 0.98 ratio         PTT - ( 28 Aug 2024 05:58 )  PTT:26.9 sec  CARDIAC MARKERS ( 28 Aug 2024 05:58 )  x     / x     / x     / x     / 15.2 ng/mL  CARDIAC MARKERS ( 27 Aug 2024 20:58 )  x     / x     / x     / x     / 2.0 ng/mL      Urinalysis Basic - ( 28 Aug 2024 05:58 )    Color: x / Appearance: x / SG: x / pH: x  Gluc: 91 mg/dL / Ketone: x  / Bili: x / Urobili: x   Blood: x / Protein: x / Nitrite: x   Leuk Esterase: x / RBC: x / WBC x   Sq Epi: x / Non Sq Epi: x / Bacteria: x        RADIOLOGY & ADDITIONAL TESTS:    Imaging Personally Reviewed:    Consultant(s) Notes Reviewed:  cards    Care Discussed with Consultants/Other Providers: Dr. Traylor (cards)

## 2024-08-28 NOTE — HISTORY OF PRESENT ILLNESS
[[email or web address]] : CC: chest pain  85 y/o M with PMH of HTN, dilated aorta presents for on and off chest pressure for 2-3 weeks. Chest pain ws worse this AM, radiating to left side neck, last night was exertional went he went on a walk.  Denies sob, enriquez, dizziness, orthopnea, diaphoresis, palpitations, LE swelling, syncope, n/v, headache. People call EMS today who came to his house and rec ER visit but he refused. some belching burping GERD symptoms and getting lightheaded from time to time. Pt denies focal weakness, vision changes, numbness/tingling, dysphagia, dysarthria.

## 2024-08-28 NOTE — PHYSICAL EXAM
[No Acute Distress] : no acute distress [Well Nourished] : well nourished [Well Developed] : well developed [Well-Appearing] : well-appearing [Normal Sclera/Conjunctiva] : normal sclera/conjunctiva [PERRL] : pupils equal round and reactive to light [EOMI] : extraocular movements intact [Normal Outer Ear/Nose] : the outer ears and nose were normal in appearance [Normal Oropharynx] : the oropharynx was normal [No JVD] : no jugular venous distention [No Lymphadenopathy] : no lymphadenopathy [Supple] : supple [Thyroid Normal, No Nodules] : the thyroid was normal and there were no nodules present [No Respiratory Distress] : no respiratory distress  [No Accessory Muscle Use] : no accessory muscle use [Clear to Auscultation] : lungs were clear to auscultation bilaterally [Normal Rate] : normal rate  [Normal S1, S2] : normal S1 and S2 [Regular Rhythm] : with a regular rhythm [No Murmur] : no murmur heard [No Carotid Bruits] : no carotid bruits [No Abdominal Bruit] : a ~M bruit was not heard ~T in the abdomen [No Varicosities] : no varicosities [Pedal Pulses Present] : the pedal pulses are present [No Edema] : there was no peripheral edema [No Palpable Aorta] : no palpable aorta [No Extremity Clubbing/Cyanosis] : no extremity clubbing/cyanosis [Soft] : abdomen soft [Non Tender] : non-tender [Non-distended] : non-distended [No Masses] : no abdominal mass palpated [No HSM] : no HSM [Normal Bowel Sounds] : normal bowel sounds [Normal Posterior Cervical Nodes] : no posterior cervical lymphadenopathy [Normal Anterior Cervical Nodes] : no anterior cervical lymphadenopathy [No CVA Tenderness] : no CVA  tenderness [No Spinal Tenderness] : no spinal tenderness [No Joint Swelling] : no joint swelling [Grossly Normal Strength/Tone] : grossly normal strength/tone [No Rash] : no rash [Coordination Grossly Intact] : coordination grossly intact [No Focal Deficits] : no focal deficits [Normal Gait] : normal gait [Deep Tendon Reflexes (DTR)] : deep tendon reflexes were 2+ and symmetric [Normal Affect] : the affect was normal [Normal Insight/Judgement] : insight and judgment were intact [Alert and Oriented x3] : oriented to person, place, and time

## 2024-08-28 NOTE — CONSULT NOTE ADULT - SUBJECTIVE AND OBJECTIVE BOX
DATE OF SERVICE: 08-28-24 @ 13:08    CHIEF COMPLAINT:Patient is a 84y old  Male who presents with a chief complaint of chest pain (28 Aug 2024 11:19)      HISTORY OF PRESENT ILLNESS:  Pt is an 83 y/o M with PMHx of HTN, GERD, BPH, presenting to the ER with chest pain. For the past 2-3 weeks, the patient has had 2-3 episodes of sub-sternal burning chest pain that last <15 minutes and are mild intensity in pain; most recently it occurred last night while walking 3/10 in intensity. At 5AM, the patient was in bed when he began experiencing sudden 10/10 sub-sternal chest pain radiating to the jaw and left arm. EMS was called and they performed an EKG which was "normal" and he elected to go to his physician Dr. Gomez. He performed an EKG and advised him to go to the emergency room for "nuc stress vs CTC". Throughout the day, he has been having this episodic chest pain that can now last up to 1 hour and at rest with associated chills and nausea. Pt states that he can normally tolerate extended activity and he goes swimming.     ROS: Denies fevers, chills, nausea, vomiting, diarrhea, shortness of breath, headache, recent colds, recent travel (went to [place] last summer).    ED Course: EKG x2 without ST Elevations, Troponin 10->10->18, CKMB 2.0, CTA no aortic dissection or PE, CXR insignificant. S/p famotidine, maalox, ofirmev x 2, fentanyl 50mcg , atorvastatin 40, 6/10 pain at rest after receiving pain medications.    PMHx:  HTN  GERD  BPH  Glaucoma  UTI currently    Meds:   Amlodipine 2.5 mg TID  Losartan Potassium 25 mg QD  Finasteride 5 mg QD  Tamsulosin HCl 0.4 mg QD at bedtime  Omeprazole 20 mg QD  Lumigan 0.01% 1 drop in each eye at bedtime  Rhopressa Opth 0.02% 1 drop in each eye at bedtime  Flonase 2 sprays in each nostril QD  Cefpodoxime 200 mg QD - current UTI    Allergies: NKDA    Surgical Hx:  Surgery for "colon blockage"  R Hip replacement    Social Hx:  Ex-smoker (60 years prior for 3-4 years), denies alcohol use, denies illicit drug use.  Dr. Jaime Barrett - [phone]  Dr. Roberto Gomez - Cardiologist [phone]  Dr. Emanuel Dozier - Urologist [phone]  Dr. Thomas Thompson - Glaucoma specialist [phone]  Emergency Contact - Dahlia Fofana [phone]      MEDICATIONS  (STANDING):  trimethoprim  160 mG/sulfamethoxazole 800 mG 1 Tablet(s) Oral two times a day    MEDICATIONS  (PRN):    PHYSICAL EXAM:  Vital Signs Last 24 Hrs  T(C): 36.8 (27 Aug 2024 21:35), Max: 36.8 (27 Aug 2024 21:35)  T(F): 98.2 (27 Aug 2024 21:35), Max: 98.2 (27 Aug 2024 21:35)  HR: 60 (27 Aug 2024 21:35) (50 - 67)  BP: 167/72 (27 Aug 2024 21:35) (135/60 - 167/72)  BP(mean): --  RR: 18 (27 Aug 2024 21:35) (17 - 20)  SpO2: 99% (27 Aug 2024 21:35) (97% - 99%)    Parameters below as of 27 Aug 2024 21:35  Patient On (Oxygen Delivery Method): room air      CAPILLARY BLOOD GLUCOSE      I&O's Summary    CONSTITUTIONAL: NAD  HEENT: NC/AT  RESPIRATORY: Normal respiratory effort; lungs are clear to auscultation bilaterally  CARDIOVASCULAR: Regular rate and rhythm, normal S1 and S2, no murmur/rub/gallop; No lower extremity edema; Peripheral pulses are 2+ bilaterally  ABDOMEN: Nontender to palpation, normoactive bowel sounds, no rebound/guarding; No hepatosplenomegaly  MUSCLOSKELETAL: no clubbing or cyanosis of digits; no joint swelling or tenderness to palpation  EXTREMITIES: no peripheral edema, distal pulses intact   NEURO: no focal deficits   PSYCH: A+O to person, place, and time; affect appropriate    LABS:                        13.4   5.75  )-----------( 190      ( 27 Aug 2024 13:24 )             40.4     08-27    133<L>  |  101  |  20  ----------------------------<  110<H>  4.4   |  20<L>  |  0.96    Ca    10.0      27 Aug 2024 13:24    TPro  7.4  /  Alb  4.0  /  TBili  0.5  /  DBili  x   /  AST  21  /  ALT  27  /  AlkPhos  116  08-27    PT/INR - ( 27 Aug 2024 13:24 )   PT: 10.6 sec;   INR: 0.96 ratio         PTT - ( 27 Aug 2024 13:24 )  PTT:28.0 sec  CARDIAC MARKERS ( 27 Aug 2024 20:58 )  x     / x     / x     / x     / 2.0 ng/mL      Urinalysis Basic - ( 27 Aug 2024 13:24 )    Color: x / Appearance: x / SG: x / pH: x  Gluc: 110 mg/dL / Ketone: x  / Bili: x / Urobili: x   Blood: x / Protein: x / Nitrite: x   Leuk Esterase: x / RBC: x / WBC x   Sq Epi: x / Non Sq Epi: x / Bacteria: x      IMAGING:    [X] All pertinent imaging reviewed by me   (27 Aug 2024 23:09)      PAST MEDICAL & SURGICAL HISTORY:  Mild HTN      History of BPH      GERD (gastroesophageal reflux disease)      History of osteoarthritis      Shingles      PND (post-nasal drip)      Glaucoma      History of partial colectomy      History of appendectomy      History of tonsillectomy              MEDICATIONS:  amLODIPine   Tablet 2.5 milliGRAM(s) Oral daily  aspirin  chewable 81 milliGRAM(s) Oral daily  heparin   Injectable 4100 Unit(s) IV Push every 6 hours PRN  heparin  Infusion.  Unit(s)/Hr IV Continuous <Continuous>  losartan 25 milliGRAM(s) Oral daily    trimethoprim  160 mG/sulfamethoxazole 800 mG 1 Tablet(s) Oral two times a day        pantoprazole    Tablet 40 milliGRAM(s) Oral before breakfast    atorvastatin 40 milliGRAM(s) Oral at bedtime  finasteride 5 milliGRAM(s) Oral daily    fluticasone propionate 50 MICROgram(s)/spray Nasal Spray 2 Spray(s) Both Nostrils every 12 hours  latanoprost 0.005% Ophthalmic Solution 1 Drop(s) Both EYES at bedtime  tamsulosin 0.4 milliGRAM(s) Oral at bedtime      FAMILY HISTORY:      Non-contributory    SOCIAL HISTORY:    [- ] Tobacco  [ -] Drugs  [- ] Alcohol    Allergies    No Known Allergies    Intolerances    	    REVIEW OF SYSTEMS:  CONSTITUTIONAL: No fever  EYES: No eye pain, visual disturbances, or discharge  ENMT:  No difficulty hearing, tinnitus  NECK: No pain or stiffness  RESPIRATORY: No cough, wheezing, + COSTA  CARDIOVASCULAR: + chest pain, palpitations, passing out, dizziness, or leg swelling  GASTROINTESTINAL:  No nausea, vomiting, diarrhea or constipation. No melena.  GENITOURINARY: No dysuria, hematuria  NEUROLOGICAL: No stroke like symptoms  SKIN: No burning or lesions   ENDOCRINE: No heat or cold intolerance  MUSCULOSKELETAL: No joint pain or swelling  PSYCHIATRIC: No  anxiety, mood swings  HEME/LYMPH: No bleeding gums  ALLERGY AND IMMUNOLOGIC: No hives or eczema	    All other ROS negative    PHYSICAL EXAM:  T(C): 36.9 (08-28-24 @ 12:09), Max: 36.9 (08-28-24 @ 05:15)  HR: 53 (08-28-24 @ 12:09) (47 - 67)  BP: 128/71 (08-28-24 @ 12:09) (114/63 - 167/72)  RR: 18 (08-28-24 @ 12:09) (17 - 18)  SpO2: 96% (08-28-24 @ 12:09) (96% - 99%)  Wt(kg): --  I&O's Summary      Appearance: Normal	  HEENT:   Normal oral mucosa, EOMI	  Cardiovascular:  S1 S2, No JVD,    Respiratory: Lungs clear to auscultation	  Psychiatry: Alert  Gastrointestinal:  Soft, Non-tender, + BS	  Skin: No rashes   Neurologic: Non-focal  Extremities:  No edema  Vascular: Peripheral pulses palpable    	    	  	  CARDIAC MARKERS:  Labs personally reviewed by me                                  13.0   8.59  )-----------( 205      ( 28 Aug 2024 05:58 )             38.6     08-28    136  |  102  |  16  ----------------------------<  91  4.0   |  23  |  1.03    Ca    9.8      28 Aug 2024 05:58  Phos  2.3     08-28  Mg     2.4     08-28    TPro  6.9  /  Alb  3.8  /  TBili  0.3  /  DBili  x   /  AST  25  /  ALT  22  /  AlkPhos  112  08-28          EKG: Personally reviewed by me -   Radiology: Personally reviewed by me -     < from: Xray Chest 1 View- PORTABLE-Urgent (08.27.24 @ 13:09) >  The heart is normal in size.  The lungs are clear.  There is no pneumothorax or pleural effusion.  No acute osseous abnormalities.    IMPRESSION:  Clear lungs.    < end of copied text >  < from: CT Angio Abdomen and Pelvis w/ IV Cont (08.27.24 @ 19:42) >  No aortic dissection or intramural hematoma. Although not tailored for   evaluation of pulmonary arteries, there is no pulmonary embolus into the   level of the segmental/subsegmental branches.    The visualized great vessels are patent. Patent celiac artery, SMA, ALFA,   bilateral renal arteries, and bilateral common iliac arteries and their   visualized branches.    No emergent findings in the abdomen or pelvis.      Assessment /Plan:     Pt is an 83 y/o M with PMHx of HTN, GERD, BPH, presenting to the ER with chest pain. For the past 2-3 weeks, the patient has had 2-3 episodes of sub-sternal burning chest pain that last <15 minutes and are mild intensity in pain; most recently it occurred last night while walking 3/10 in intensity. At 5AM, the patient was in bed when he began experiencing sudden 10/10 sub-sternal chest pain radiating to the jaw and left arm.     Problem/Plan #1:  Problem: Chest Pain  Plan: Cardiac cath 8/28 r/o CAD  - Chest pain woke patient from sleep described as mid-sternal radiating to jaw. Mild COSTA/chest sensation within exertion for last couple of weeks.   - + FHX of CAD (father)  - Will review ECG  - Trop 10--?18-->242  - Heparin gtt initiated  - Loaded with Plavix, ASA/statin initiated    Problem/Plan #2:  Problem: Hypertension  Plan: Continue with home meds  - c/w amlodipine 2.5mg PO daily  - c/w losartan 25mg PO daily    Problem/Plan #3:  Problem: HLD  Plan: Continue with atorvastatin 80mg PO daily  - LDL 92    Problem/Plan #4:  Problem: Dilated Aortic Arch  Plan: Noted as 4.00cm on recent CT  - c/w OP surveillance    Differential diagnosis and plan of care discussed with patient after the evaluation. Counseling on diet, nutritional counseling, weight management, exercise and medication compliance was done.   Advanced care planning/advanced directives discussed with patient/family. DNR status including forceful chest compressions to attempt to restart the heart, ventilator support/artificial breathing, electric shock, artificial nutrition, health care proxy, Molst form all discussed with pt. Pt wishes to consider. More than fifteen minutes spent on discussing advanced directives.     Nicolette Crawley [facility]  Chema Traylor DO [facility]  Cardiovascular Medicine  [address] [place] [address] Dr, [address]  Available for call or text via [employer] TE[employer]  Office [phone]   DATE OF SERVICE: 08-28-24 @ 13:08    CHIEF COMPLAINT:Patient is a 84y old  Male who presents with a chief complaint of chest pain (28 Aug 2024 11:19)      HISTORY OF PRESENT ILLNESS:  Pt is an 83 y/o M with PMHx of HTN, GERD, BPH, presenting to the ER with chest pain. For the past 2-3 weeks, the patient has had 2-3 episodes of sub-sternal burning chest pain that last <15 minutes and are mild intensity in pain; most recently it occurred last night while walking 3/10 in intensity. At 5AM, the patient was in bed when he began experiencing sudden 10/10 sub-sternal chest pain radiating to the jaw and left arm. EMS was called and they performed an EKG which was "normal" and he elected to go to his physician Dr. Gomez. He performed an EKG and advised him to go to the emergency room for "nuc stress vs CTC". Throughout the day, he has been having this episodic chest pain that can now last up to 1 hour and at rest with associated chills and nausea. Pt states that he can normally tolerate extended activity and he goes swimming.     ROS: Denies fevers, chills, nausea, vomiting, diarrhea, shortness of breath, headache, recent colds, recent travel (went to [place] last summer).    ED Course: EKG x2 without ST Elevations, Troponin 10->10->18, CKMB 2.0, CTA no aortic dissection or PE, CXR insignificant. S/p famotidine, maalox, ofirmev x 2, fentanyl 50mcg , atorvastatin 40, 6/10 pain at rest after receiving pain medications.    PMHx:  HTN  GERD  BPH  Glaucoma  UTI currently    Meds:   Amlodipine 2.5 mg TID  Losartan Potassium 25 mg QD  Finasteride 5 mg QD  Tamsulosin HCl 0.4 mg QD at bedtime  Omeprazole 20 mg QD  Lumigan 0.01% 1 drop in each eye at bedtime  Rhopressa Opth 0.02% 1 drop in each eye at bedtime  Flonase 2 sprays in each nostril QD  Cefpodoxime 200 mg QD - current UTI    Allergies: NKDA    Surgical Hx:  Surgery for "colon blockage"  R Hip replacement    Social Hx:  Ex-smoker (60 years prior for 3-4 years), denies alcohol use, denies illicit drug use.  Dr. Jaime Barrett - [phone]  Dr. Roberto Gomez - Cardiologist [phone]  Dr. Emanuel Dozier - Urologist [phone]  Dr. Thomas Thompson - Glaucoma specialist [phone]  Emergency Contact - Dahlia Fofana [phone]      MEDICATIONS  (STANDING):  trimethoprim  160 mG/sulfamethoxazole 800 mG 1 Tablet(s) Oral two times a day    MEDICATIONS  (PRN):    PHYSICAL EXAM:  Vital Signs Last 24 Hrs  T(C): 36.8 (27 Aug 2024 21:35), Max: 36.8 (27 Aug 2024 21:35)  T(F): 98.2 (27 Aug 2024 21:35), Max: 98.2 (27 Aug 2024 21:35)  HR: 60 (27 Aug 2024 21:35) (50 - 67)  BP: 167/72 (27 Aug 2024 21:35) (135/60 - 167/72)  BP(mean): --  RR: 18 (27 Aug 2024 21:35) (17 - 20)  SpO2: 99% (27 Aug 2024 21:35) (97% - 99%)    Parameters below as of 27 Aug 2024 21:35  Patient On (Oxygen Delivery Method): room air      CAPILLARY BLOOD GLUCOSE      I&O's Summary    CONSTITUTIONAL: NAD  HEENT: NC/AT  RESPIRATORY: Normal respiratory effort; lungs are clear to auscultation bilaterally  CARDIOVASCULAR: Regular rate and rhythm, normal S1 and S2, no murmur/rub/gallop; No lower extremity edema; Peripheral pulses are 2+ bilaterally  ABDOMEN: Nontender to palpation, normoactive bowel sounds, no rebound/guarding; No hepatosplenomegaly  MUSCLOSKELETAL: no clubbing or cyanosis of digits; no joint swelling or tenderness to palpation  EXTREMITIES: no peripheral edema, distal pulses intact   NEURO: no focal deficits   PSYCH: A+O to person, place, and time; affect appropriate    LABS:                        13.4   5.75  )-----------( 190      ( 27 Aug 2024 13:24 )             40.4     08-27    133<L>  |  101  |  20  ----------------------------<  110<H>  4.4   |  20<L>  |  0.96    Ca    10.0      27 Aug 2024 13:24    TPro  7.4  /  Alb  4.0  /  TBili  0.5  /  DBili  x   /  AST  21  /  ALT  27  /  AlkPhos  116  08-27    PT/INR - ( 27 Aug 2024 13:24 )   PT: 10.6 sec;   INR: 0.96 ratio         PTT - ( 27 Aug 2024 13:24 )  PTT:28.0 sec  CARDIAC MARKERS ( 27 Aug 2024 20:58 )  x     / x     / x     / x     / 2.0 ng/mL      Urinalysis Basic - ( 27 Aug 2024 13:24 )    Color: x / Appearance: x / SG: x / pH: x  Gluc: 110 mg/dL / Ketone: x  / Bili: x / Urobili: x   Blood: x / Protein: x / Nitrite: x   Leuk Esterase: x / RBC: x / WBC x   Sq Epi: x / Non Sq Epi: x / Bacteria: x      IMAGING:    [X] All pertinent imaging reviewed by me   (27 Aug 2024 23:09)      PAST MEDICAL & SURGICAL HISTORY:  Mild HTN      History of BPH      GERD (gastroesophageal reflux disease)      History of osteoarthritis      Shingles      PND (post-nasal drip)      Glaucoma      History of partial colectomy      History of appendectomy      History of tonsillectomy              MEDICATIONS:  amLODIPine   Tablet 2.5 milliGRAM(s) Oral daily  aspirin  chewable 81 milliGRAM(s) Oral daily  heparin   Injectable 4100 Unit(s) IV Push every 6 hours PRN  heparin  Infusion.  Unit(s)/Hr IV Continuous <Continuous>  losartan 25 milliGRAM(s) Oral daily    trimethoprim  160 mG/sulfamethoxazole 800 mG 1 Tablet(s) Oral two times a day        pantoprazole    Tablet 40 milliGRAM(s) Oral before breakfast    atorvastatin 40 milliGRAM(s) Oral at bedtime  finasteride 5 milliGRAM(s) Oral daily    fluticasone propionate 50 MICROgram(s)/spray Nasal Spray 2 Spray(s) Both Nostrils every 12 hours  latanoprost 0.005% Ophthalmic Solution 1 Drop(s) Both EYES at bedtime  tamsulosin 0.4 milliGRAM(s) Oral at bedtime      FAMILY HISTORY:      Non-contributory    SOCIAL HISTORY:    [- ] Tobacco  [ -] Drugs  [- ] Alcohol    Allergies    No Known Allergies    Intolerances    	    REVIEW OF SYSTEMS:  CONSTITUTIONAL: No fever  EYES: No eye pain, visual disturbances, or discharge  ENMT:  No difficulty hearing, tinnitus  NECK: No pain or stiffness  RESPIRATORY: No cough, wheezing, + COSTA  CARDIOVASCULAR: + chest pain, palpitations, passing out, dizziness, or leg swelling  GASTROINTESTINAL:  No nausea, vomiting, diarrhea or constipation. No melena.  GENITOURINARY: No dysuria, hematuria  NEUROLOGICAL: No stroke like symptoms  SKIN: No burning or lesions   ENDOCRINE: No heat or cold intolerance  MUSCULOSKELETAL: No joint pain or swelling  PSYCHIATRIC: No  anxiety, mood swings  HEME/LYMPH: No bleeding gums  ALLERGY AND IMMUNOLOGIC: No hives or eczema	    All other ROS negative    PHYSICAL EXAM:  T(C): 36.9 (08-28-24 @ 12:09), Max: 36.9 (08-28-24 @ 05:15)  HR: 53 (08-28-24 @ 12:09) (47 - 67)  BP: 128/71 (08-28-24 @ 12:09) (114/63 - 167/72)  RR: 18 (08-28-24 @ 12:09) (17 - 18)  SpO2: 96% (08-28-24 @ 12:09) (96% - 99%)  Wt(kg): --  I&O's Summary      Appearance: Normal	  HEENT:   Normal oral mucosa, EOMI	  Cardiovascular:  S1 S2, No JVD,    Respiratory: Lungs clear to auscultation	  Psychiatry: Alert  Gastrointestinal:  Soft, Non-tender, + BS	  Skin: No rashes   Neurologic: Non-focal  Extremities:  No edema  Vascular: Peripheral pulses palpable    	    	  	  CARDIAC MARKERS:  Labs personally reviewed by me                                  13.0   8.59  )-----------( 205      ( 28 Aug 2024 05:58 )             38.6     08-28    136  |  102  |  16  ----------------------------<  91  4.0   |  23  |  1.03    Ca    9.8      28 Aug 2024 05:58  Phos  2.3     08-28  Mg     2.4     08-28    TPro  6.9  /  Alb  3.8  /  TBili  0.3  /  DBili  x   /  AST  25  /  ALT  22  /  AlkPhos  112  08-28          EKG: Personally reviewed by me -   Radiology: Personally reviewed by me -     < from: Xray Chest 1 View- PORTABLE-Urgent (08.27.24 @ 13:09) >  The heart is normal in size.  The lungs are clear.  There is no pneumothorax or pleural effusion.  No acute osseous abnormalities.    IMPRESSION:  Clear lungs.    < end of copied text >  < from: CT Angio Abdomen and Pelvis w/ IV Cont (08.27.24 @ 19:42) >  No aortic dissection or intramural hematoma. Although not tailored for   evaluation of pulmonary arteries, there is no pulmonary embolus into the   level of the segmental/subsegmental branches.    The visualized great vessels are patent. Patent celiac artery, SMA, ALFA,   bilateral renal arteries, and bilateral common iliac arteries and their   visualized branches.    No emergent findings in the abdomen or pelvis.      Assessment /Plan:     Pt is an 83 y/o M with PMHx of HTN, GERD, BPH, presenting to the ER with chest pain. For the past 2-3 weeks, the patient has had 2-3 episodes of sub-sternal burning chest pain that last <15 minutes and are mild intensity in pain; most recently it occurred last night while walking 3/10 in intensity. At 5AM, the patient was in bed when he began experiencing sudden 10/10 sub-sternal chest pain radiating to the jaw and left arm.     Problem/Plan #1:  Problem: NSTEMI  Plan: Plan for Cardiac cath 8/28 r/o CAD as schedule allows  - Chest pain woke patient from sleep described as mid-sternal radiating to jaw. Mild COSTA/chest sensation within exertion for last couple of weeks.   - 8/28 chest pain free with no recurrence   - + FHX of CAD (father)  - Will review ECG  - Trop 10--?18-->242  - Heparin gtt initiated  - Loaded with Plavix, ASA/statin initiated    Problem/Plan #2:  Problem: Hypertension  Plan: Continue with home meds  - c/w amlodipine 2.5mg PO daily  - c/w losartan 25mg PO daily    Problem/Plan #3:  Problem: HLD  Plan: Continue with atorvastatin 80mg PO daily  - LDL 92    Problem/Plan #4:  Problem: Dilated Aortic Arch  Plan: Noted as 4.00cm on recent CT  - c/w OP surveillance          Differential diagnosis and plan of care discussed with patient after the evaluation. Counseling on diet, nutritional counseling, weight management, exercise and medication compliance was done.   Advanced care planning/advanced directives discussed with patient/family. DNR status including forceful chest compressions to attempt to restart the heart, ventilator support/artificial breathing, electric shock, artificial nutrition, health care proxy, Molst form all discussed with pt. Pt wishes to consider. More than fifteen minutes spent on discussing advanced directives.     Nicolette Crawley [facility]  Chema Traylor DO [facility]  Cardiovascular Medicine  [address] [place] [address] Dr, [address]  Available for call or text via [employer] TE[employer]  Office [phone]

## 2024-08-29 LAB
ANION GAP SERPL CALC-SCNC: 13 MMOL/L — SIGNIFICANT CHANGE UP (ref 5–17)
APTT BLD: 58.9 SEC — HIGH (ref 24.5–35.6)
APTT BLD: 85.8 SEC — HIGH (ref 24.5–35.6)
BACTERIA UR CULT: ABNORMAL
BUN SERPL-MCNC: 19 MG/DL — SIGNIFICANT CHANGE UP (ref 7–23)
CALCIUM SERPL-MCNC: 9.5 MG/DL — SIGNIFICANT CHANGE UP (ref 8.4–10.5)
CHLORIDE SERPL-SCNC: 100 MMOL/L — SIGNIFICANT CHANGE UP (ref 96–108)
CO2 SERPL-SCNC: 23 MMOL/L — SIGNIFICANT CHANGE UP (ref 22–31)
CREAT SERPL-MCNC: 1.25 MG/DL — SIGNIFICANT CHANGE UP (ref 0.5–1.3)
EGFR: 57 ML/MIN/1.73M2 — LOW
GLUCOSE SERPL-MCNC: 90 MG/DL — SIGNIFICANT CHANGE UP (ref 70–99)
HCT VFR BLD CALC: 40.5 % — SIGNIFICANT CHANGE UP (ref 39–50)
HGB BLD-MCNC: 13.6 G/DL — SIGNIFICANT CHANGE UP (ref 13–17)
MCHC RBC-ENTMCNC: 31.9 PG — SIGNIFICANT CHANGE UP (ref 27–34)
MCHC RBC-ENTMCNC: 33.6 GM/DL — SIGNIFICANT CHANGE UP (ref 32–36)
MCV RBC AUTO: 94.8 FL — SIGNIFICANT CHANGE UP (ref 80–100)
NRBC # BLD: 0 /100 WBCS — SIGNIFICANT CHANGE UP (ref 0–0)
PHOSPHATE SERPL-MCNC: 2.1 MG/DL — LOW (ref 2.5–4.5)
PLATELET # BLD AUTO: 189 K/UL — SIGNIFICANT CHANGE UP (ref 150–400)
POTASSIUM SERPL-MCNC: 4.3 MMOL/L — SIGNIFICANT CHANGE UP (ref 3.5–5.3)
POTASSIUM SERPL-SCNC: 4.3 MMOL/L — SIGNIFICANT CHANGE UP (ref 3.5–5.3)
RBC # BLD: 4.27 M/UL — SIGNIFICANT CHANGE UP (ref 4.2–5.8)
RBC # FLD: 13.6 % — SIGNIFICANT CHANGE UP (ref 10.3–14.5)
SODIUM SERPL-SCNC: 136 MMOL/L — SIGNIFICANT CHANGE UP (ref 135–145)
WBC # BLD: 7.5 K/UL — SIGNIFICANT CHANGE UP (ref 3.8–10.5)
WBC # FLD AUTO: 7.5 K/UL — SIGNIFICANT CHANGE UP (ref 3.8–10.5)

## 2024-08-29 PROCEDURE — 93010 ELECTROCARDIOGRAM REPORT: CPT

## 2024-08-29 PROCEDURE — 99233 SBSQ HOSP IP/OBS HIGH 50: CPT

## 2024-08-29 PROCEDURE — 93454 CORONARY ARTERY ANGIO S&I: CPT | Mod: 26,59

## 2024-08-29 PROCEDURE — 92928 PRQ TCAT PLMT NTRAC ST 1 LES: CPT | Mod: LC

## 2024-08-29 PROCEDURE — 99152 MOD SED SAME PHYS/QHP 5/>YRS: CPT

## 2024-08-29 RX ORDER — ASPIRIN 81 MG
81 TABLET, DELAYED RELEASE (ENTERIC COATED) ORAL DAILY
Refills: 0 | Status: DISCONTINUED | OUTPATIENT
Start: 2024-08-30 | End: 2024-09-01

## 2024-08-29 RX ORDER — SODIUM PHOSPHATE, DIBASIC, ANHYDROUS, POTASSIUM PHOSPHATE, MONOBASIC, AND SODIUM PHOSPHATE, MONOBASIC, MONOHYDRATE 852; 155; 130 MG/1; MG/1; MG/1
1 TABLET, COATED ORAL
Refills: 0 | Status: COMPLETED | OUTPATIENT
Start: 2024-08-29 | End: 2024-08-30

## 2024-08-29 RX ORDER — SODIUM CHLORIDE 9 MG/ML
1000 INJECTION INTRAMUSCULAR; INTRAVENOUS; SUBCUTANEOUS
Refills: 0 | Status: DISCONTINUED | OUTPATIENT
Start: 2024-08-29 | End: 2024-08-31

## 2024-08-29 RX ADMIN — Medication 40 MILLIGRAM(S): at 05:22

## 2024-08-29 RX ADMIN — LOSARTAN POTASSIUM 25 MILLIGRAM(S): 50 TABLET ORAL at 05:22

## 2024-08-29 RX ADMIN — TAMSULOSIN HYDROCHLORIDE 0.4 MILLIGRAM(S): 0.4 CAPSULE ORAL at 21:22

## 2024-08-29 RX ADMIN — Medication 81 MILLIGRAM(S): at 11:14

## 2024-08-29 RX ADMIN — LATANOPROST 1 DROP(S): 50 SOLUTION OPHTHALMIC at 22:07

## 2024-08-29 RX ADMIN — Medication 0 UNIT(S)/HR: at 06:17

## 2024-08-29 RX ADMIN — Medication 750 UNIT(S)/HR: at 06:59

## 2024-08-29 RX ADMIN — FLUTICASONE PROPIONATE 2 SPRAY(S): 50 SPRAY, METERED NASAL at 05:23

## 2024-08-29 RX ADMIN — SODIUM PHOSPHATE, DIBASIC, ANHYDROUS, POTASSIUM PHOSPHATE, MONOBASIC, AND SODIUM PHOSPHATE, MONOBASIC, MONOHYDRATE 1 PACKET(S): 852; 155; 130 TABLET, COATED ORAL at 23:02

## 2024-08-29 RX ADMIN — SODIUM CHLORIDE 50 MILLILITER(S): 9 INJECTION INTRAMUSCULAR; INTRAVENOUS; SUBCUTANEOUS at 19:54

## 2024-08-29 RX ADMIN — SULFAMETHOXAZOLE AND TRIMETHOPRIM 1 TABLET(S): 800; 160 TABLET ORAL at 22:07

## 2024-08-29 RX ADMIN — AMLODIPINE BESYLATE 2.5 MILLIGRAM(S): 10 TABLET ORAL at 05:23

## 2024-08-29 RX ADMIN — Medication 750 UNIT(S)/HR: at 14:35

## 2024-08-29 RX ADMIN — Medication 40 MILLIGRAM(S): at 22:07

## 2024-08-29 RX ADMIN — FINASTERIDE 5 MILLIGRAM(S): 1 TABLET, FILM COATED ORAL at 11:14

## 2024-08-29 RX ADMIN — Medication 75 MILLIGRAM(S): at 14:57

## 2024-08-29 RX ADMIN — SULFAMETHOXAZOLE AND TRIMETHOPRIM 1 TABLET(S): 800; 160 TABLET ORAL at 05:23

## 2024-08-29 NOTE — PROGRESS NOTE ADULT - SUBJECTIVE AND OBJECTIVE BOX
Risa Roldan MD  Division of Hospital Medicine  Please contact via MS Teams (prefer message first)  Office: [phone]    Patient is a 84y old  Male who presents with a chief complaint of chest pain (28 Aug 2024 13:08)      SUBJECTIVE / OVERNIGHT EVENTS:  no acute events overnight, vss, afebrile  pt feels okay, anxious to get LHC done  no other complaints    ROS:  14 point ROS negative in detail except stated as above    MEDICATIONS  (STANDING):  amLODIPine   Tablet 2.5 milliGRAM(s) Oral daily  aspirin  chewable 81 milliGRAM(s) Oral daily  atorvastatin 40 milliGRAM(s) Oral at bedtime  finasteride 5 milliGRAM(s) Oral daily  fluticasone propionate 50 MICROgram(s)/spray Nasal Spray 2 Spray(s) Both Nostrils every 12 hours  heparin  Infusion.  Unit(s)/Hr (8 mL/Hr) IV Continuous <Continuous>  latanoprost 0.005% Ophthalmic Solution 1 Drop(s) Both EYES at bedtime  losartan 25 milliGRAM(s) Oral daily  pantoprazole    Tablet 40 milliGRAM(s) Oral before breakfast  tamsulosin 0.4 milliGRAM(s) Oral at bedtime  trimethoprim  160 mG/sulfamethoxazole 800 mG 1 Tablet(s) Oral two times a day    MEDICATIONS  (PRN):  heparin   Injectable 4100 Unit(s) IV Push every 6 hours PRN For aPTT less than 40      CAPILLARY BLOOD GLUCOSE        I&O's Summary    28 Aug 2024 07:01  -  29 Aug 2024 07:00  --------------------------------------------------------  IN: 480 mL / OUT: 0 mL / NET: 480 mL        PHYSICAL EXAM:  Vital Signs Last 24 Hrs  T(C): 36.8 (29 Aug 2024 04:24), Max: 36.9 (28 Aug 2024 12:09)  T(F): 98.2 (29 Aug 2024 04:24), Max: 98.4 (28 Aug 2024 12:09)  HR: 48 (29 Aug 2024 04:24) (48 - 53)  BP: 120/60 (29 Aug 2024 04:24) (120/60 - 159/66)  BP(mean): --  RR: 18 (29 Aug 2024 04:24) (18 - 18)  SpO2: 95% (29 Aug 2024 04:24) (95% - 97%)    Parameters below as of 29 Aug 2024 04:24  Patient On (Oxygen Delivery Method): room air      GENERAL: NAD, well-developed  HEAD:  Atraumatic, Normocephalic  EYES: EOMI, PERRLA, conjunctiva and sclera clear  NECK: Supple, No JVD  CHEST/LUNG: Clear to auscultation bilaterally; No wheeze  HEART: Regular rate and rhythm; No murmurs, rubs, or gallops  ABDOMEN: Soft, Nontender, Nondistended; Bowel sounds present  EXTREMITIES:  2+ Peripheral Pulses, No clubbing, cyanosis, or edema  NEUROLOGY: AAOx3; non-focal  SKIN: No rashes or lesions    LABS:                        13.6   7.50  )-----------( 189      ( 29 Aug 2024 05:32 )             40.5     08-29    136  |  100  |  19  ----------------------------<  90  4.3   |  23  |  1.25    Ca    9.5      29 Aug 2024 05:32  Phos  2.1     08-29  Mg     2.4     08-28    TPro  6.9  /  Alb  3.8  /  TBili  0.3  /  DBili  x   /  AST  25  /  ALT  22  /  AlkPhos  112  08-28    PT/INR - ( 28 Aug 2024 05:58 )   PT: 10.8 sec;   INR: 0.98 ratio         PTT - ( 29 Aug 2024 05:32 )  PTT:85.8 sec  CARDIAC MARKERS ( 28 Aug 2024 05:58 )  x     / x     / x     / x     / 15.2 ng/mL  CARDIAC MARKERS ( 27 Aug 2024 20:58 )  x     / x     / x     / x     / 2.0 ng/mL      Urinalysis Basic - ( 29 Aug 2024 05:32 )    Color: x / Appearance: x / SG: x / pH: x  Gluc: 90 mg/dL / Ketone: x  / Bili: x / Urobili: x   Blood: x / Protein: x / Nitrite: x   Leuk Esterase: x / RBC: x / WBC x   Sq Epi: x / Non Sq Epi: x / Bacteria: x        RADIOLOGY & ADDITIONAL TESTS:    Imaging Personally Reviewed:    Consultant(s) Notes Reviewed:  cards    Care Discussed with Consultants/Other Providers: Dr. Traylor (cards)

## 2024-08-29 NOTE — PROGRESS NOTE ADULT - SUBJECTIVE AND OBJECTIVE BOX
DATE OF SERVICE: 08-29-24 @ 13:38    Patient is a 84y old  Male who presents with a chief complaint of chest pain (29 Aug 2024 10:27)      INTERVAL HISTORY: Feels ok.     REVIEW OF SYSTEMS:  CONSTITUTIONAL: No weakness  EYES/ENT: No visual changes;  No throat pain   NECK: No pain or stiffness  RESPIRATORY: No cough, wheezing; No shortness of breath  CARDIOVASCULAR: No chest pain or palpitations  GASTROINTESTINAL: No abdominal  pain. No nausea, vomiting, or hematemesis  GENITOURINARY: No dysuria, frequency or hematuria  NEUROLOGICAL: No stroke like symptoms  SKIN: No rashes    TELEMETRY Personally reviewed: SB 40-50s  	  MEDICATIONS:  amLODIPine   Tablet 2.5 milliGRAM(s) Oral daily  losartan 25 milliGRAM(s) Oral daily        PHYSICAL EXAM:  T(C): 36.7 (08-29-24 @ 11:43), Max: 36.8 (08-29-24 @ 04:24)  HR: 48 (08-29-24 @ 11:43) (48 - 50)  BP: 137/70 (08-29-24 @ 11:43) (120/60 - 159/66)  RR: 18 (08-29-24 @ 11:43) (18 - 18)  SpO2: 96% (08-29-24 @ 11:43) (95% - 97%)  Wt(kg): --  I&O's Summary    28 Aug 2024 07:01  -  29 Aug 2024 07:00  --------------------------------------------------------  IN: 480 mL / OUT: 0 mL / NET: 480 mL          Appearance: In no distress	  HEENT:    PERRL, EOMI	  Cardiovascular:  S1 S2, No JVD  Respiratory: Lungs clear to auscultation	  Gastrointestinal:  Soft, Non-tender, + BS	  Vascularature:  No edema of LE  Psychiatric: Appropriate affect   Neuro: no acute focal deficits                               13.6   7.50  )-----------( 189      ( 29 Aug 2024 05:32 )             40.5     08-29    136  |  100  |  19  ----------------------------<  90  4.3   |  23  |  1.25    Ca    9.5      29 Aug 2024 05:32  Phos  2.1     08-29  Mg     2.4     08-28    TPro  6.9  /  Alb  3.8  /  TBili  0.3  /  DBili  x   /  AST  25  /  ALT  22  /  AlkPhos  112  08-28        Labs personally reviewed      ASSESSMENT/PLAN: 	    Pt is an 83 y/o M with PMHx of HTN, GERD, BPH, presenting to the ER with chest pain. For the past 2-3 weeks, the patient has had 2-3 episodes of sub-sternal burning chest pain that last <15 minutes and are mild intensity in pain; most recently it occurred last night while walking 3/10 in intensity. At 5AM, the patient was in bed when he began experiencing sudden 10/10 sub-sternal chest pain radiating to the jaw and left arm.     Problem/Plan #1:  Problem: NSTEMI  Plan: Plan for Cardiac cath 8/29 r/o CAD   - Chest pain woke patient from sleep described as mid-sternal radiating to jaw. Mild COSTA/chest sensation within exertion for last couple of weeks.   - 8/28 chest pain free with no recurrence   - + FHX of CAD (father)  - Will review ECG  - Trop 10--?18-->242  - Heparin gtt initiated  - Loaded with Plavix, ASA/statin initiated    Problem/Plan #2:  Problem: Hypertension  Plan: Continue with home meds  - c/w amlodipine 2.5mg PO daily  - c/w losartan 25mg PO daily    Problem/Plan #3:  Problem: HLD  Plan: Continue with atorvastatin 80mg PO daily  - LDL 92    Problem/Plan #4:  Problem: Dilated Aortic Arch  Plan: Noted as 4.00cm on recent CT  - c/w OP surveillance        Nicolette Crawley, AG-NP   Chema Traylor DO [facility]  Cardiovascular Medicine  [address], [address]  Available through call or text on [employer] TEAMs  Office: [phone]   DATE OF SERVICE: 08-29-24 @ 13:38    Patient is a 84y old  Male who presents with a chief complaint of chest pain (29 Aug 2024 10:27)      INTERVAL HISTORY: Feels ok.     REVIEW OF SYSTEMS:  CONSTITUTIONAL: No weakness  EYES/ENT: No visual changes;  No throat pain   NECK: No pain or stiffness  RESPIRATORY: No cough, wheezing; No shortness of breath  CARDIOVASCULAR: No chest pain or palpitations  GASTROINTESTINAL: No abdominal  pain. No nausea, vomiting, or hematemesis  GENITOURINARY: No dysuria, frequency or hematuria  NEUROLOGICAL: No stroke like symptoms  SKIN: No rashes    TELEMETRY Personally reviewed: SB 40-50s  	  MEDICATIONS:  amLODIPine   Tablet 2.5 milliGRAM(s) Oral daily  losartan 25 milliGRAM(s) Oral daily        PHYSICAL EXAM:  T(C): 36.7 (08-29-24 @ 11:43), Max: 36.8 (08-29-24 @ 04:24)  HR: 48 (08-29-24 @ 11:43) (48 - 50)  BP: 137/70 (08-29-24 @ 11:43) (120/60 - 159/66)  RR: 18 (08-29-24 @ 11:43) (18 - 18)  SpO2: 96% (08-29-24 @ 11:43) (95% - 97%)  Wt(kg): --  I&O's Summary    28 Aug 2024 07:01  -  29 Aug 2024 07:00  --------------------------------------------------------  IN: 480 mL / OUT: 0 mL / NET: 480 mL          Appearance: In no distress	  HEENT:    PERRL, EOMI	  Cardiovascular:  S1 S2, No JVD  Respiratory: Lungs clear to auscultation	  Gastrointestinal:  Soft, Non-tender, + BS	  Vascularature:  No edema of LE  Psychiatric: Appropriate affect   Neuro: no acute focal deficits                               13.6   7.50  )-----------( 189      ( 29 Aug 2024 05:32 )             40.5     08-29    136  |  100  |  19  ----------------------------<  90  4.3   |  23  |  1.25    Ca    9.5      29 Aug 2024 05:32  Phos  2.1     08-29  Mg     2.4     08-28    TPro  6.9  /  Alb  3.8  /  TBili  0.3  /  DBili  x   /  AST  25  /  ALT  22  /  AlkPhos  112  08-28        Labs personally reviewed      ASSESSMENT/PLAN: 	    Pt is an 85 y/o M with PMHx of HTN, GERD, BPH, presenting to the ER with chest pain. For the past 2-3 weeks, the patient has had 2-3 episodes of sub-sternal burning chest pain that last <15 minutes and are mild intensity in pain; most recently it occurred last night while walking 3/10 in intensity. At 5AM, the patient was in bed when he began experiencing sudden 10/10 sub-sternal chest pain radiating to the jaw and left arm.     Problem/Plan #1:  Problem: NSTEMI  Plan: Plan for Cardiac cath 8/29 r/o CAD   - Chest pain woke patient from sleep described as mid-sternal radiating to jaw. Mild COSTA/chest sensation within exertion for last couple of weeks.   - 8/28 chest pain free with no recurrence   - + FHX of CAD (father)  - Will review ECG  - Trop 10--?18-->242  - Heparin gtt initiated  - Loaded with Plavix, ASA/statin initiated  - s/p cath 8/29 with subtotal LCx s/p PCI and plan for staged LAD PCI Friday    Problem/Plan #2:  Problem: Hypertension  Plan: Continue with home meds  - c/w amlodipine 2.5mg PO daily  - c/w losartan 25mg PO daily    Problem/Plan #3:  Problem: HLD  Plan: Continue with atorvastatin 80mg PO daily  - LDL 92    Problem/Plan #4:  Problem: Dilated Aortic Arch  Plan: Noted as 4.00cm on recent CT  - c/w OP surveillance        ERNESTO Purvis-SALONI Traylor DO [facility]  Cardiovascular Medicine  [address], [address]  Available through call or text on [employer] TEAMs  Office: [phone]

## 2024-08-30 ENCOUNTER — TRANSCRIPTION ENCOUNTER (OUTPATIENT)
Age: 84
End: 2024-08-30

## 2024-08-30 LAB
HCT VFR BLD CALC: 39.8 % — SIGNIFICANT CHANGE UP (ref 39–50)
HGB BLD-MCNC: 13.3 G/DL — SIGNIFICANT CHANGE UP (ref 13–17)
MCHC RBC-ENTMCNC: 32 PG — SIGNIFICANT CHANGE UP (ref 27–34)
MCHC RBC-ENTMCNC: 33.4 GM/DL — SIGNIFICANT CHANGE UP (ref 32–36)
MCV RBC AUTO: 95.9 FL — SIGNIFICANT CHANGE UP (ref 80–100)
NRBC # BLD: 0 /100 WBCS — SIGNIFICANT CHANGE UP (ref 0–0)
PLATELET # BLD AUTO: 188 K/UL — SIGNIFICANT CHANGE UP (ref 150–400)
RBC # BLD: 4.15 M/UL — LOW (ref 4.2–5.8)
RBC # FLD: 13.7 % — SIGNIFICANT CHANGE UP (ref 10.3–14.5)
WBC # BLD: 6.72 K/UL — SIGNIFICANT CHANGE UP (ref 3.8–10.5)
WBC # FLD AUTO: 6.72 K/UL — SIGNIFICANT CHANGE UP (ref 3.8–10.5)

## 2024-08-30 PROCEDURE — 99233 SBSQ HOSP IP/OBS HIGH 50: CPT

## 2024-08-30 PROCEDURE — 93010 ELECTROCARDIOGRAM REPORT: CPT | Mod: 76

## 2024-08-30 PROCEDURE — 99232 SBSQ HOSP IP/OBS MODERATE 35: CPT | Mod: FS

## 2024-08-30 PROCEDURE — 92928 PRQ TCAT PLMT NTRAC ST 1 LES: CPT | Mod: LD

## 2024-08-30 PROCEDURE — 93454 CORONARY ARTERY ANGIO S&I: CPT | Mod: 26,78

## 2024-08-30 PROCEDURE — 99152 MOD SED SAME PHYS/QHP 5/>YRS: CPT

## 2024-08-30 RX ORDER — FENTANYL CITRATE 50 UG/ML
25 INJECTION INTRAMUSCULAR; INTRAVENOUS ONCE
Refills: 0 | Status: DISCONTINUED | OUTPATIENT
Start: 2024-08-30 | End: 2024-08-30

## 2024-08-30 RX ORDER — SODIUM CHLORIDE 9 MG/ML
1000 INJECTION INTRAMUSCULAR; INTRAVENOUS; SUBCUTANEOUS
Refills: 0 | Status: DISCONTINUED | OUTPATIENT
Start: 2024-08-30 | End: 2024-08-31

## 2024-08-30 RX ORDER — ONDANSETRON 2 MG/ML
4 INJECTION, SOLUTION INTRAMUSCULAR; INTRAVENOUS ONCE
Refills: 0 | Status: COMPLETED | OUTPATIENT
Start: 2024-08-30 | End: 2024-08-30

## 2024-08-30 RX ORDER — METOPROLOL TARTRATE 100 MG/1
12.5 TABLET ORAL EVERY 12 HOURS
Refills: 0 | Status: DISCONTINUED | OUTPATIENT
Start: 2024-08-30 | End: 2024-08-31

## 2024-08-30 RX ADMIN — FENTANYL CITRATE 25 MICROGRAM(S): 50 INJECTION INTRAMUSCULAR; INTRAVENOUS at 20:15

## 2024-08-30 RX ADMIN — Medication 40 MILLIGRAM(S): at 21:50

## 2024-08-30 RX ADMIN — LATANOPROST 1 DROP(S): 50 SOLUTION OPHTHALMIC at 22:49

## 2024-08-30 RX ADMIN — FINASTERIDE 5 MILLIGRAM(S): 1 TABLET, FILM COATED ORAL at 11:41

## 2024-08-30 RX ADMIN — Medication 40 MILLIGRAM(S): at 05:43

## 2024-08-30 RX ADMIN — LOSARTAN POTASSIUM 25 MILLIGRAM(S): 50 TABLET ORAL at 05:43

## 2024-08-30 RX ADMIN — SULFAMETHOXAZOLE AND TRIMETHOPRIM 1 TABLET(S): 800; 160 TABLET ORAL at 21:52

## 2024-08-30 RX ADMIN — AMLODIPINE BESYLATE 2.5 MILLIGRAM(S): 10 TABLET ORAL at 05:43

## 2024-08-30 RX ADMIN — FENTANYL CITRATE 25 MICROGRAM(S): 50 INJECTION INTRAMUSCULAR; INTRAVENOUS at 19:50

## 2024-08-30 RX ADMIN — Medication 81 MILLIGRAM(S): at 11:41

## 2024-08-30 RX ADMIN — TAMSULOSIN HYDROCHLORIDE 0.4 MILLIGRAM(S): 0.4 CAPSULE ORAL at 21:50

## 2024-08-30 RX ADMIN — ONDANSETRON 4 MILLIGRAM(S): 2 INJECTION, SOLUTION INTRAMUSCULAR; INTRAVENOUS at 21:50

## 2024-08-30 RX ADMIN — FENTANYL CITRATE 25 MICROGRAM(S): 50 INJECTION INTRAMUSCULAR; INTRAVENOUS at 21:10

## 2024-08-30 RX ADMIN — Medication 75 MILLIGRAM(S): at 11:41

## 2024-08-30 RX ADMIN — SODIUM PHOSPHATE, DIBASIC, ANHYDROUS, POTASSIUM PHOSPHATE, MONOBASIC, AND SODIUM PHOSPHATE, MONOBASIC, MONOHYDRATE 1 PACKET(S): 852; 155; 130 TABLET, COATED ORAL at 08:16

## 2024-08-30 RX ADMIN — FLUTICASONE PROPIONATE 2 SPRAY(S): 50 SPRAY, METERED NASAL at 05:43

## 2024-08-30 RX ADMIN — SODIUM CHLORIDE 75 MILLILITER(S): 9 INJECTION INTRAMUSCULAR; INTRAVENOUS; SUBCUTANEOUS at 17:40

## 2024-08-30 RX ADMIN — FENTANYL CITRATE 25 MICROGRAM(S): 50 INJECTION INTRAMUSCULAR; INTRAVENOUS at 20:56

## 2024-08-30 RX ADMIN — SULFAMETHOXAZOLE AND TRIMETHOPRIM 1 TABLET(S): 800; 160 TABLET ORAL at 08:16

## 2024-08-30 NOTE — DISCHARGE NOTE PROVIDER - PROVIDER TOKENS
PROVIDER:[TOKEN:[[id number]],FOLLOWUP:[2 weeks],ESTABLISHEDPATIENT:[T]] PROVIDER:[TOKEN:[[id number]],FOLLOWUP:[2 weeks],ESTABLISHEDPATIENT:[T]],PROVIDER:[TOKEN:[[id number]],FOLLOWUP:[1 week],ESTABLISHEDPATIENT:[T]]

## 2024-08-30 NOTE — DISCHARGE NOTE PROVIDER - CARE PROVIDER_API CALL
Jaime Barrett  Internal Medicine  [facility], [address]  [place] [address]  Phone: [phone]  Fax: [phone]  Established Patient  Follow Up Time: 2 weeks   Jaime Barrett  Internal Medicine  [address], [address]  [place] [address]  Phone: [phone]  Fax: [phone]  Established Patient  Follow Up Time: 2 weeks    Roberto Gomez  Cardiology  [address], [address]  [place] [address]  Phone: [phone]  Fax: [phone]  Established Patient  Follow Up Time: 1 week

## 2024-08-30 NOTE — PROGRESS NOTE ADULT - SUBJECTIVE AND OBJECTIVE BOX
Risa Roldan MD  Division of Hospital Medicine  Please contact via MS Teams (prefer message first)  Office: [phone]    Patient is a 84y old  Male who presents with a chief complaint of chest pain (30 Aug 2024 07:09)      SUBJECTIVE / OVERNIGHT EVENTS:  no acute events overnight, vss, afebrile  s/p [facility] wiht PCI to LCx  plan for staged PCI to LAD today  pt feels well otherwise    ROS:  14 point ROS negative in detail except stated as above    MEDICATIONS  (STANDING):  amLODIPine   Tablet 2.5 milliGRAM(s) Oral daily  aspirin enteric coated 81 milliGRAM(s) Oral daily  atorvastatin 40 milliGRAM(s) Oral at bedtime  clopidogrel Tablet 75 milliGRAM(s) Oral daily  finasteride 5 milliGRAM(s) Oral daily  fluticasone propionate 50 MICROgram(s)/spray Nasal Spray 2 Spray(s) Both Nostrils every 12 hours  latanoprost 0.005% Ophthalmic Solution 1 Drop(s) Both EYES at bedtime  losartan 25 milliGRAM(s) Oral daily  pantoprazole    Tablet 40 milliGRAM(s) Oral before breakfast  sodium chloride 0.9%. 1000 milliLiter(s) (50 mL/Hr) IV Continuous <Continuous>  tamsulosin 0.4 milliGRAM(s) Oral at bedtime  trimethoprim  160 mG/sulfamethoxazole 800 mG 1 Tablet(s) Oral two times a day    MEDICATIONS  (PRN):      CAPILLARY BLOOD GLUCOSE        I&O's Summary      PHYSICAL EXAM:  Vital Signs Last 24 Hrs  T(C): 36.7 (30 Aug 2024 08:16), Max: 37.1 (29 Aug 2024 22:27)  T(F): 98 (30 Aug 2024 08:16), Max: 98.7 (29 Aug 2024 22:27)  HR: 51 (30 Aug 2024 08:16) (46 - 56)  BP: 109/61 (30 Aug 2024 08:16) (103/57 - 154/65)  BP(mean): 59 (29 Aug 2024 19:20) (59 - 59)  RR: 18 (30 Aug 2024 08:16) (15 - 18)  SpO2: 97% (30 Aug 2024 08:16) (94% - 98%)    Parameters below as of 30 Aug 2024 08:16  Patient On (Oxygen Delivery Method): room air      GENERAL: NAD, well-developed  HEAD:  Atraumatic, Normocephalic  EYES: EOMI, PERRLA, conjunctiva and sclera clear  NECK: Supple, No JVD  CHEST/LUNG: Clear to auscultation bilaterally; No wheeze  HEART: Regular rate and rhythm; No murmurs, rubs, or gallops  ABDOMEN: Soft, Nontender, Nondistended; Bowel sounds present  EXTREMITIES:  2+ Peripheral Pulses, No clubbing, cyanosis, or edema  NEUROLOGY: AAOx3; non-focal  SKIN: No rashes or lesions    LABS:                        13.3   6.72  )-----------( 188      ( 30 Aug 2024 06:23 )             39.8     08-29    136  |  100  |  19  ----------------------------<  90  4.3   |  23  |  1.25    Ca    9.5      29 Aug 2024 05:32  Phos  2.1     08-29      PTT - ( 29 Aug 2024 13:07 )  PTT:58.9 sec      Urinalysis Basic - ( 29 Aug 2024 05:32 )    Color: x / Appearance: x / SG: x / pH: x  Gluc: 90 mg/dL / Ketone: x  / Bili: x / Urobili: x   Blood: x / Protein: x / Nitrite: x   Leuk Esterase: x / RBC: x / WBC x   Sq Epi: x / Non Sq Epi: x / Bacteria: x        RADIOLOGY & ADDITIONAL TESTS:    Imaging Personally Reviewed:    Consultant(s) Notes Reviewed:  cards    Care Discussed with Consultants/Other Providers: Dr. deshpande (cards)

## 2024-08-30 NOTE — DISCHARGE NOTE PROVIDER - NSDCCPCAREPLAN_GEN_ALL_CORE_FT
PRINCIPAL DISCHARGE DIAGNOSIS  Diagnosis: Encounter for cardiac rehabilitation  Assessment and Plan of Treatment:      PRINCIPAL DISCHARGE DIAGNOSIS  Diagnosis: NSTEMI (non-ST elevation myocardial infarction)  Assessment and Plan of Treatment: You had blockages in your coronary arteries and stents were placed. To prevent blockages of the stent, continue taking aspirin, plavix, and statin without interruption. Avoid taking NSAIDs such as Aleve, Motrin, Ibuprofen, naproxen as this may increase your risk of stomach ulcers or bleeding. Follow-up with your cardiology team.      SECONDARY DISCHARGE DIAGNOSES  Diagnosis: Chest pain  Assessment and Plan of Treatment: We have provided you with a prescription for cardiac rehab which is medically supervised exercise program for your heart and has been shown to improve the quantity and quality of life of people with heart disease like yours. You should attend cardiac rehab 3 times per week for 12 weeks. We have provided you with a list of nearby facilities. Please call your insurance carrier to determine which of these facilities are covered under your plan. Please bring this prescription with you to your follow up appointment with your cardiologist who can then further assist you to enroll into a cardiac rehab program.

## 2024-08-30 NOTE — PROGRESS NOTE ADULT - SUBJECTIVE AND OBJECTIVE BOX
Interventional Cardiology Post Cath Progress Note:                Subjective:   Patient feels well- no current complaints. NO chest pain, shortness of breath. NO pain, swelling, or numbness by right radial access site .       MEDICATIONS  (STANDING):  amLODIPine   Tablet 2.5 milliGRAM(s) Oral daily  aspirin enteric coated 81 milliGRAM(s) Oral daily  atorvastatin 40 milliGRAM(s) Oral at bedtime  clopidogrel Tablet 75 milliGRAM(s) Oral daily  finasteride 5 milliGRAM(s) Oral daily  fluticasone propionate 50 MICROgram(s)/spray Nasal Spray 2 Spray(s) Both Nostrils every 12 hours  latanoprost 0.005% Ophthalmic Solution 1 Drop(s) Both EYES at bedtime  losartan 25 milliGRAM(s) Oral daily  pantoprazole    Tablet 40 milliGRAM(s) Oral before breakfast  potassium phosphate / sodium phosphate Powder (PHOS-NaK) 1 Packet(s) Oral two times a day  sodium chloride 0.9%. 1000 milliLiter(s) (50 mL/Hr) IV Continuous <Continuous>  tamsulosin 0.4 milliGRAM(s) Oral at bedtime  trimethoprim  160 mG/sulfamethoxazole 800 mG 1 Tablet(s) Oral two times a day    MEDICATIONS  (PRN):      Objective:  Vital Signs Last 24 Hrs  T(C): 36.8 (30 Aug 2024 05:06), Max: 37.1 (29 Aug 2024 22:27)  T(F): 98.2 (30 Aug 2024 05:06), Max: 98.7 (29 Aug 2024 22:27)  HR: 54 (30 Aug 2024 05:06) (46 - 56)  BP: 116/64 (30 Aug 2024 05:06) (103/57 - 154/65)  BP(mean): 59 (29 Aug 2024 19:20) (59 - 59)  RR: 18 (30 Aug 2024 05:06) (15 - 18)  SpO2: 96% (30 Aug 2024 05:06) (94% - 98%)    Parameters below as of 30 Aug 2024 05:06  Patient On (Oxygen Delivery Method): room air                                13.3   6.72  )-----------( 188      ( 30 Aug 2024 06:23 )             39.8     08-29    136  |  100  |  19  ----------------------------<  90  4.3   |  23  |  1.25    Ca    9.5      29 Aug 2024 05:32  Phos  2.1     08-29      PTT - ( 29 Aug 2024 13:07 )  PTT:58.9 sec  Urinalysis Basic - ( 29 Aug 2024 05:32 )      Physical Exam:  GEN- No apparent distress, alert and oriented times three, appropriate affect  LUNGS-Clear to auscultation with no wheezing, ronchi or crackles  HEART- Regular rate and rhythm with no murmur, rub or gallop  ABD - Positive bowel sounds, soft, non-tender   EXT - Right Upper Extremity: Soft, non tender, no bleeding or hematoma, right radial pulse 2+/2+        Assessment/Plan: 84y Male PMH hypertension, GERD admitted with chest pain s/p p PCI DESx 1 to LCx via Right radial artery staged for LAD today     - return to cath lab for Staged procedure - Left anterior descending artery .  - radial site stable   - Continue DAPT (aspirin 81mg and clopidogrel 75mg)  - Continue statin  -continue losartan (EF45%)  - Avoid using NSAIDs  (Aleve, Motrin, ibuprofen, naproxen) while on DAPT, please utilize Tylenol for pain control (not to exceed 4gm in 24 hours)  - Keep Potassium> 4.0 and Magnesium>2.0  - Care per primary team  - Followup with outpatient cardiologist  Dr Gomez in 2 weeks

## 2024-08-30 NOTE — DISCHARGE NOTE PROVIDER - CARE PROVIDERS DIRECT ADDRESSES
[email or web address]

## 2024-08-30 NOTE — PROGRESS NOTE ADULT - SUBJECTIVE AND OBJECTIVE BOX
DATE OF SERVICE: 08-30-24 @ 11:45    Patient is a 84y old  Male who presents with a chief complaint of chest pain (30 Aug 2024 11:27)      INTERVAL HISTORY: Feels ok.     REVIEW OF SYSTEMS:  CONSTITUTIONAL: No weakness  EYES/ENT: No visual changes;  No throat pain   NECK: No pain or stiffness  RESPIRATORY: No cough, wheezing; No shortness of breath  CARDIOVASCULAR: No chest pain or palpitations  GASTROINTESTINAL: No abdominal  pain. No nausea, vomiting, or hematemesis  GENITOURINARY: No dysuria, frequency or hematuria  NEUROLOGICAL: No stroke like symptoms  SKIN: No rashes    TELEMETRY Personally reviewed: SB 40-50s  	  MEDICATIONS:  amLODIPine   Tablet 2.5 milliGRAM(s) Oral daily  losartan 25 milliGRAM(s) Oral daily        PHYSICAL EXAM:  T(C): 36.7 (08-30-24 @ 08:16), Max: 37.1 (08-29-24 @ 22:27)  HR: 51 (08-30-24 @ 08:16) (46 - 56)  BP: 109/61 (08-30-24 @ 08:16) (103/57 - 154/65)  RR: 18 (08-30-24 @ 08:16) (15 - 18)  SpO2: 97% (08-30-24 @ 08:16) (94% - 98%)  Wt(kg): --  I&O's Summary        Appearance: In no distress	  HEENT:    PERRL, EOMI	  Cardiovascular:  S1 S2, No JVD  Respiratory: Lungs clear to auscultation	  Gastrointestinal:  Soft, Non-tender, + BS	  Vascularature:  No edema of LE  Psychiatric: Appropriate affect   Neuro: no acute focal deficits                               13.3   6.72  )-----------( 188      ( 30 Aug 2024 06:23 )             39.8     08-29    136  |  100  |  19  ----------------------------<  90  4.3   |  23  |  1.25    Ca    9.5      29 Aug 2024 05:32  Phos  2.1     08-29          Labs personally reviewed      ASSESSMENT/PLAN: 	    Pt is an 83 y/o M with PMHx of HTN, GERD, BPH, presenting to the ER with chest pain. For the past 2-3 weeks, the patient has had 2-3 episodes of sub-sternal burning chest pain that last <15 minutes and are mild intensity in pain; most recently it occurred last night while walking 3/10 in intensity. At 5AM, the patient was in bed when he began experiencing sudden 10/10 sub-sternal chest pain radiating to the jaw and left arm.     Problem/Plan #1:  Problem: NSTEMI  Plan: Plan for Cardiac cath 8/29 r/o CAD   - Chest pain woke patient from sleep described as mid-sternal radiating to jaw. Mild COSTA/chest sensation within exertion for last couple of weeks.   - 8/28 chest pain free with no recurrence   - + FHX of CAD (father)  - Trop 10--?18-->242  - Loaded with Plavix, ASA/statin initiated  - s/p cath 8/29 with subtotal LCx s/p PCI and plan for staged LAD PCI 8/30    Problem/Plan #2:  Problem: Hypertension  Plan: Continue with home meds  - c/w amlodipine 2.5mg PO daily  - c/w losartan 25mg PO daily    Problem/Plan #3:  Problem: HLD  Plan: Continue with atorvastatin 80mg PO daily  - LDL 92    Problem/Plan #4:  Problem: Dilated Aortic Arch  Plan: Noted as 4.00cm on recent CT  - c/w OP surveillance    Problem/Plan #5:  Problem: Heart Failure with mildly reduced Ejection Fraction  Plan: 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 given regional WMA in LCx territory  - TTE show EF 45-50%, + WMA  - s/p PCI of LCX, plan for staged PCI of LAD 8/30  - Will defer further implementation of sHF GDMT given high contrast load with CT studies and cardiac cath  --- c/w losartan 25mg PO daily  --- defer BB given bradycardia (this is patient's baseline lifelong)  --- [id number] c/i given UTI  --- OK to continue low dose amlodipine   - Appears euvolemic  - OP follow up with repeat TTE in 3-6 months following revascularization      Nicolette Crawley, ERNESTO-SALONI Traylor,  [facility]  Cardiovascular Medicine  [address], [address]  Available through call or text on [employer] TE[employer]  Office: [phone]

## 2024-08-30 NOTE — DISCHARGE NOTE PROVIDER - NSDCFUSCHEDAPPT_GEN_ALL_CORE_FT
[facility]  ORTHOSURG [address]  Scheduled Appointment: 09/04/2024

## 2024-08-30 NOTE — DISCHARGE NOTE PROVIDER - NSDCMRMEDTOKEN_GEN_ALL_CORE_FT
amLODIPine 2.5 mg oral tablet: 3 tab(s) orally once a day  cefpodoxime 200 mg oral tablet: 1 tab(s) orally 2 times a day  cefpodoxime 200 mg oral tablet: 1 tab(s) orally 2 times a day  finasteride 5 mg oral tablet: 1 tab(s) orally once a day  Flonase 50 mcg/inh nasal spray: 12 spray(s) in each nostril once a day  losartan 25 mg oral tablet: 1 tab(s) orally once a day  Lumigan 0.01% ophthalmic solution: 1 drop(s) in each affected eye once a day (at bedtime)  omeprazole 20 mg oral delayed release capsule: 1 cap(s) orally once a day  Rhopressa 0.02% ophthalmic solution: 1 drop(s) in each eye once a day (at bedtime)  tamsulosin 0.4 mg oral capsule: 1 cap(s) orally once a day    amLODIPine 2.5 mg oral tablet: 3 tab(s) orally once a day  aspirin 81 mg oral delayed release tablet: 1 tab(s) orally once a day  atorvastatin 40 mg oral tablet: 1 tab(s) orally once a day (at bedtime)  Cardiac Rehab: Three times a week for 12 weeks post PCI  clopidogrel 75 mg oral tablet: 1 tab(s) orally once a day  finasteride 5 mg oral tablet: 1 tab(s) orally once a day  Flonase 50 mcg/inh nasal spray: 12 spray(s) in each nostril once a day  isosorbide mononitrate 30 mg oral tablet, extended release: 1 tab(s) orally once a day  losartan 25 mg oral tablet: 1 tab(s) orally once a day  Lumigan 0.01% ophthalmic solution: 1 drop(s) in each affected eye once a day (at bedtime)  omeprazole 20 mg oral delayed release capsule: 1 cap(s) orally once a day  Rhopressa 0.02% ophthalmic solution: 1 drop(s) in each eye once a day (at bedtime)  tamsulosin 0.4 mg oral capsule: 1 cap(s) orally once a day

## 2024-08-30 NOTE — PHARMACOTHERAPY INTERVENTION NOTE - COMMENTS
Counseled patient/patient’s caregiver  on the following inpatient/discharge medications names (brand/generic), indication, and possible side effects:    MEDICATIONS  (STANDING):  amLODIPine   Tablet 2.5 milliGRAM(s) Oral daily  aspirin enteric coated 81 milliGRAM(s) Oral daily  atorvastatin 40 milliGRAM(s) Oral at bedtime  clopidogrel Tablet 75 milliGRAM(s) Oral daily  finasteride 5 milliGRAM(s) Oral daily  fluticasone propionate 50 MICROgram(s)/spray Nasal Spray 2 Spray(s) Both Nostrils every 12 hours  latanoprost 0.005% Ophthalmic Solution 1 Drop(s) Both EYES at bedtime  losartan 25 milliGRAM(s) Oral daily  pantoprazole    Tablet 40 milliGRAM(s) Oral before breakfast  tamsulosin 0.4 milliGRAM(s) Oral at bedtime  trimethoprim  160 mG/sulfamethoxazole 800 mG 1 Tablet(s) Oral two times a day  Printed patient's medication list/schedule on [email or web address] And provided to patient at bedside which reviews indication, scheduled time, and possible side effects of the meds.     - Stressed importance of compliance to DAPT therapy.  - Educated patient to avoid using NSAIDs  (Aleve, Motrin, ibuprofen, naproxen) while on DAPT, recommended to use Tylenol OTC for pain control (not to exceed 4gm in 24 hours)  - Patient was provided with a medication card for their new medication. Patient questions and concerns were answered and addressed. Patient demonstrated understanding.  - Pt would like all discharge RXs sent to [facility]. Write in pharmacy note: "meds to bed, date (MM:DD), time (HH:MM) on the scripts.     Nazario (Grant Sanchez  Transitions of Care Pharmacist  Available on [employer] Teams (Preferred)  Spectra: [phone]

## 2024-08-30 NOTE — DISCHARGE NOTE PROVIDER - HOSPITAL COURSE
83 y/o M with PMHx of HTN, GERD, BPH, presenting to the ER with chest pain. For the past 2-3 weeks, the patient has had 2-3 episodes of sub-sternal burning chest pain that last <15 minutes and are mild intensity in pain; most recently it occurred last night while walking 3/10 in intensity. At 5AM, the patient was in bed when he began experiencing sudden 10/10 sub-sternal chest pain radiating to the jaw and left arm. EMS was called and they performed an EKG which was "normal" and he elected to go to his physician Dr. Gomez. He performed an EKG and advised him to go to the emergency room for "nuc stress vs CTC". Throughout the day, he has been having this episodic chest pain that can now last up to 1 hour and at rest with associated chills and nausea. Pt states that he can normally tolerate extended activity and he goes swimming.     ROS: Denies fevers, chills, nausea, vomiting, diarrhea, shortness of breath, headache, recent colds, recent travel (went to [place] last summer).    ED Course: EKG x2 without ST Elevations, Troponin 10->10->18, CKMB 2.0, CTA no aortic dissection or PE, CXR insignificant. S/p famotidine, maalox, ofirmev x 2, fentanyl 50mcg , atorvastatin 40, 6/10 pain at rest after receiving pain medications.    Cardiac Rehab (STEMI/NSTEMI/ACS/Unstable Angina/CHF/Chronic Stable Angina/Heart Surgery (CABG,Valve)/Post PCI):              *Education on benefits of Cardiac Rehab provided to patient: Yes         *Referral and Prescription Given for Cardiac Rehab : Yes         *Pt given list of locations & instructed to contact their insurance company to review list of participating providers: Yes         *Pt instructed to bring Cardiac Rehab prescription with them to Cardiology Follow up appointment for assistance with enrollment: Yes         *Pt discharged with copies detail cardiovascular history, medications, testing/treatments: Yes     85 y/o M with PMHx of HTN, GERD, BPH, presenting to the ER with chest pain. For the past 2-3 weeks, the patient has had 2-3 episodes of sub-sternal burning chest pain that last <15 minutes and are mild intensity in pain; most recently it occurred last night while walking 3/10 in intensity. At 5AM, the patient was in bed when he began experiencing sudden 10/10 sub-sternal chest pain radiating to the jaw and left arm. EMS was called and they performed an EKG which was "normal" and he elected to go to his physician Dr. Gomez. He performed an EKG and advised him to go to the emergency room for "nuc stress vs CTC". Throughout the day, he has been having this episodic chest pain that can now last up to 1 hour and at rest with associated chills and nausea. Pt states that he can normally tolerate extended activity and he goes swimming.     ROS: Denies fevers, chills, nausea, vomiting, diarrhea, shortness of breath, headache, recent colds, recent travel (went to [place] last summer).    ED Course: EKG x2 without ST Elevations, Troponin 10->10->18, CKMB 2.0, CTA no aortic dissection or PE, CXR insignificant. S/p famotidine, maalox, ofirmev x 2, fentanyl 50mcg , atorvastatin 40, 6/10 pain at rest after receiving pain medications.    Cardiac Rehab (STEMI/NSTEMI/ACS/Unstable Angina/CHF/Chronic Stable Angina/Heart Surgery (CABG,Valve)/Post PCI):              *Education on benefits of Cardiac Rehab provided to patient: Yes         *Referral and Prescription Given for Cardiac Rehab : Yes         *Pt given list of locations & instructed to contact their insurance company to review list of participating providers: Yes         *Pt instructed to bring Cardiac Rehab prescription with them to Cardiology Follow up appointment for assistance with enrollment: Yes         *Pt discharged with copies detail cardiovascular history, medications, testing/treatments: Yes    Pt received stent to LCx, C 8/30 with 2x stents to LAD. Pt's course complicated by post-procedural chest pain with EKG changes. Repeat LHC showed patent stents. Pt's pain resolved. Pt medically ready for discharge. 85 y/o M with PMHx of HTN, GERD, BPH, presenting to the ER with chest pain. For the past 2-3 weeks, the patient has had 2-3 episodes of sub-sternal burning chest pain that last <15 minutes and are mild intensity in pain; most recently it occurred last night while walking 3/10 in intensity. At 5AM, the patient was in bed when he began experiencing sudden 10/10 sub-sternal chest pain radiating to the jaw and left arm. EMS was called and they performed an EKG which was "normal" and he elected to go to his physician Dr. Gomez. He performed an EKG and advised him to go to the emergency room for "nuc stress vs CTC". Throughout the day, he has been having this episodic chest pain that can now last up to 1 hour and at rest with associated chills and nausea. Pt states that he can normally tolerate extended activity and he goes swimming.     ROS: Denies fevers, chills, nausea, vomiting, diarrhea, shortness of breath, headache, recent colds, recent travel (went to [place] last summer).    ED Course: EKG x2 without ST Elevations, Troponin 10->10->18, CKMB 2.0, CTA no aortic dissection or PE, CXR insignificant. S/p famotidine, maalox, ofirmev x 2, fentanyl 50mcg , atorvastatin 40, 6/10 pain at rest after receiving pain medications.    Cardiac Rehab (STEMI/NSTEMI/ACS/Unstable Angina/CHF/Chronic Stable Angina/Heart Surgery (CABG,Valve)/Post PCI):              *Education on benefits of Cardiac Rehab provided to patient: Yes         *Referral and Prescription Given for Cardiac Rehab : Yes         *Pt given list of locations & instructed to contact their insurance company to review list of participating providers: Yes         *Pt instructed to bring Cardiac Rehab prescription with them to Cardiology Follow up appointment for assistance with enrollment: Yes         *Pt discharged with copies detail cardiovascular history, medications, testing/treatments: Yes    Pt received stent to LCx, C 8/30 with 2x stents to LAD. Pt's course complicated by post-procedural chest pain with EKG changes. Repeat LHC showed patent stents. Pt's pain resolved. Completed course of bactrim for e. coli UTI. Pt continued on home meds for GERD, BPH, HTN, glaucoma. Pt medically ready for discharge.

## 2024-08-30 NOTE — CHART NOTE - NSCHARTNOTEFT_GEN_A_CORE
Removal of Femoral Sheath    Pulses in the (right/left) lower extremity are (palpable/audible by doppler/absent). The patient was placed in the supine position. The insertion site was identified and the sutures were removed per protocol.  The 6 [place] femoral sheath was then removed. Direct pressure was applied for 18 minutes.     Monitoring of the right groin and both lower extremities including neuro-vascular checks and vital signs every 15 minutes x 4, then every 30 minutes x 2, then every 1 hour was ordered.    Complications: Small hematoma was present prior to the sheath pull which was pressed out.
83 y/o M with PMHx of HTN, GERD, BPH, presenting to the ER with chest pain. For the past 2-3 weeks, the patient has had 2-3 episodes of sub-sternal burning chest pain that last <15 minutes and are mild intensity in pain; most recently it occurred last night while walking 3/10 in intensity. At 5AM, the patient was in bed when he began experiencing sudden 10/10 sub-sternal chest pain radiating to the jaw and left arm. EMS was called and they performed an EKG which was "normal" and he elected to go to his physician Dr. Gomez. He performed an EKG and advised him to go to the emergency room for "nuc stress vs CTC". Throughout the day, he has been having this episodic chest pain that can now last up to 1 hour and at rest with associated chills and nausea. Pt states that he can normally tolerate extended activity and he goes swimming.     ROS: Denies fevers, chills, nausea, vomiting, diarrhea, shortness of breath, headache, recent colds, recent travel (went to [place] last summer).    ED Course: EKG x2 without ST Elevations, Troponin 10->10->18, CKMB 2.0, CTA no aortic dissection or PE, CXR insignificant. S/p famotidine, maalox, ofirmev x 2, fentanyl 50mcg , atorvastatin 40, 6/10 pain at rest after receiving pain medications.    8/29	s/p PCI DESx 1 to LCx via RRA staged for LAD tomorrow 8/30, on ASA/Plavix/statin  EF 45%    On 8/30 he underwent a  PCI to the LAD after returning he had new MATTEO septal , he also described aching 8/10 chest pain which he did not have yesterday after cath lab.    I spoke with Dr. Sanchez who saw both ECGs and plan is to return to the cath lab . Pt updated.
Spoke to pt's urologist Dr. Emanuel Dozier on the phone.   Pt has an E. coli UTI, just received sensitivities. Pt is sensitive to Bactrim and Ciprofloxacin.   If pt needs to be NPO, please order Cipro 400mg IV Q12hrs.  If pt is able to eat and does not have to be NPO, please order Bactrim DS BID.     [facility]  [address] [place] [address], [place] [address]  [phone]

## 2024-08-30 NOTE — DISCHARGE NOTE PROVIDER - ATTENDING DISCHARGE PHYSICAL EXAMINATION:
Vital Signs Last 24 Hrs  T(C): 37.1 (01 Sep 2024 04:00), Max: 37.2 (31 Aug 2024 11:22)  T(F): 98.8 (01 Sep 2024 04:00), Max: 98.9 (31 Aug 2024 11:22)  HR: 57 (01 Sep 2024 04:00) (48 - 57)  BP: 124/60 (01 Sep 2024 04:00) (119/64 - 146/67)  BP(mean): --  RR: 18 (01 Sep 2024 04:00) (18 - 18)  SpO2: 94% (01 Sep 2024 04:00) (94% - 97%)    Parameters below as of 01 Sep 2024 04:00  Patient On (Oxygen Delivery Method): room air    GENERAL: NAD, comfortable appearing  HEAD: NCAT  EYES: EOMI, PERRL, conjunctiva and sclera clear  NECK: Supple, No JVD  CHEST/LUNG: Clear to auscultation bilaterally; No wheeze  HEART: Regular rate and rhythm; No murmurs, rubs, or gallops  ABDOMEN: Soft, Nontender, Nondistended; Bowel sounds present  EXTREMITIES:  2+ Peripheral Pulses, No clubbing, cyanosis, or edema  NEUROLOGY: AAOx3; non-focal  SKIN: No rashes or lesions

## 2024-08-31 LAB
ANION GAP SERPL CALC-SCNC: 13 MMOL/L — SIGNIFICANT CHANGE UP (ref 5–17)
BUN SERPL-MCNC: 20 MG/DL — SIGNIFICANT CHANGE UP (ref 7–23)
CALCIUM SERPL-MCNC: 9.8 MG/DL — SIGNIFICANT CHANGE UP (ref 8.4–10.5)
CHLORIDE SERPL-SCNC: 97 MMOL/L — SIGNIFICANT CHANGE UP (ref 96–108)
CO2 SERPL-SCNC: 21 MMOL/L — LOW (ref 22–31)
CREAT SERPL-MCNC: 1.27 MG/DL — SIGNIFICANT CHANGE UP (ref 0.5–1.3)
EGFR: 56 ML/MIN/1.73M2 — LOW
GLUCOSE SERPL-MCNC: 117 MG/DL — HIGH (ref 70–99)
HCT VFR BLD CALC: 41.6 % — SIGNIFICANT CHANGE UP (ref 39–50)
HGB BLD-MCNC: 14.1 G/DL — SIGNIFICANT CHANGE UP (ref 13–17)
MCHC RBC-ENTMCNC: 32.8 PG — SIGNIFICANT CHANGE UP (ref 27–34)
MCHC RBC-ENTMCNC: 33.9 GM/DL — SIGNIFICANT CHANGE UP (ref 32–36)
MCV RBC AUTO: 96.7 FL — SIGNIFICANT CHANGE UP (ref 80–100)
NRBC # BLD: 0 /100 WBCS — SIGNIFICANT CHANGE UP (ref 0–0)
PLATELET # BLD AUTO: 200 K/UL — SIGNIFICANT CHANGE UP (ref 150–400)
POTASSIUM SERPL-MCNC: 4.7 MMOL/L — SIGNIFICANT CHANGE UP (ref 3.5–5.3)
POTASSIUM SERPL-SCNC: 4.7 MMOL/L — SIGNIFICANT CHANGE UP (ref 3.5–5.3)
RBC # BLD: 4.3 M/UL — SIGNIFICANT CHANGE UP (ref 4.2–5.8)
RBC # FLD: 13.6 % — SIGNIFICANT CHANGE UP (ref 10.3–14.5)
SODIUM SERPL-SCNC: 131 MMOL/L — LOW (ref 135–145)
WBC # BLD: 8.81 K/UL — SIGNIFICANT CHANGE UP (ref 3.8–10.5)
WBC # FLD AUTO: 8.81 K/UL — SIGNIFICANT CHANGE UP (ref 3.8–10.5)

## 2024-08-31 PROCEDURE — 93010 ELECTROCARDIOGRAM REPORT: CPT

## 2024-08-31 PROCEDURE — 99233 SBSQ HOSP IP/OBS HIGH 50: CPT

## 2024-08-31 PROCEDURE — 99232 SBSQ HOSP IP/OBS MODERATE 35: CPT

## 2024-08-31 RX ORDER — ACETAMINOPHEN 325 MG/1
975 TABLET ORAL ONCE
Refills: 0 | Status: COMPLETED | OUTPATIENT
Start: 2024-08-31 | End: 2024-08-31

## 2024-08-31 RX ORDER — ONDANSETRON 2 MG/ML
4 INJECTION, SOLUTION INTRAMUSCULAR; INTRAVENOUS ONCE
Refills: 0 | Status: COMPLETED | OUTPATIENT
Start: 2024-08-31 | End: 2024-08-31

## 2024-08-31 RX ORDER — BISMUTH SUBSALICYLATE 262MG/15ML
30 SUSPENSION, ORAL (FINAL DOSE FORM) ORAL EVERY 4 HOURS
Refills: 0 | Status: DISCONTINUED | OUTPATIENT
Start: 2024-08-31 | End: 2024-09-01

## 2024-08-31 RX ORDER — ISOSORBIDE MONONITRATE 30 MG/1
30 TABLET, EXTENDED RELEASE ORAL DAILY
Refills: 0 | Status: DISCONTINUED | OUTPATIENT
Start: 2024-08-31 | End: 2024-09-01

## 2024-08-31 RX ORDER — BISMUTH SUBSALICYLATE 262MG/15ML
30 SUSPENSION, ORAL (FINAL DOSE FORM) ORAL ONCE
Refills: 0 | Status: COMPLETED | OUTPATIENT
Start: 2024-08-31 | End: 2024-08-31

## 2024-08-31 RX ORDER — ACETAMINOPHEN 325 MG/1
650 TABLET ORAL EVERY 6 HOURS
Refills: 0 | Status: DISCONTINUED | OUTPATIENT
Start: 2024-08-31 | End: 2024-09-01

## 2024-08-31 RX ADMIN — Medication 81 MILLIGRAM(S): at 11:36

## 2024-08-31 RX ADMIN — SULFAMETHOXAZOLE AND TRIMETHOPRIM 1 TABLET(S): 800; 160 TABLET ORAL at 06:21

## 2024-08-31 RX ADMIN — ACETAMINOPHEN 975 MILLIGRAM(S): 325 TABLET ORAL at 17:02

## 2024-08-31 RX ADMIN — LATANOPROST 1 DROP(S): 50 SOLUTION OPHTHALMIC at 21:19

## 2024-08-31 RX ADMIN — ONDANSETRON 4 MILLIGRAM(S): 2 INJECTION, SOLUTION INTRAMUSCULAR; INTRAVENOUS at 08:39

## 2024-08-31 RX ADMIN — Medication 40 MILLIGRAM(S): at 06:20

## 2024-08-31 RX ADMIN — FLUTICASONE PROPIONATE 2 SPRAY(S): 50 SPRAY, METERED NASAL at 17:03

## 2024-08-31 RX ADMIN — TAMSULOSIN HYDROCHLORIDE 0.4 MILLIGRAM(S): 0.4 CAPSULE ORAL at 21:19

## 2024-08-31 RX ADMIN — AMLODIPINE BESYLATE 2.5 MILLIGRAM(S): 10 TABLET ORAL at 06:20

## 2024-08-31 RX ADMIN — Medication 40 MILLIGRAM(S): at 21:19

## 2024-08-31 RX ADMIN — Medication 75 MILLIGRAM(S): at 11:36

## 2024-08-31 RX ADMIN — SULFAMETHOXAZOLE AND TRIMETHOPRIM 1 TABLET(S): 800; 160 TABLET ORAL at 17:52

## 2024-08-31 RX ADMIN — LOSARTAN POTASSIUM 25 MILLIGRAM(S): 50 TABLET ORAL at 06:20

## 2024-08-31 RX ADMIN — FINASTERIDE 5 MILLIGRAM(S): 1 TABLET, FILM COATED ORAL at 11:36

## 2024-08-31 RX ADMIN — Medication 30 MILLILITER(S): at 17:02

## 2024-08-31 RX ADMIN — ACETAMINOPHEN 975 MILLIGRAM(S): 325 TABLET ORAL at 18:02

## 2024-08-31 NOTE — PROGRESS NOTE ADULT - TIME BILLING
patient encounter, reviewing tests and imaging, independently obtaining a history, performing a physical examination, discussing the plan with the patient, ordering medications/tests, documenting clinical information, and coordinating care. This excludes any time spent on teaching.

## 2024-08-31 NOTE — PROGRESS NOTE ADULT - SUBJECTIVE AND OBJECTIVE BOX
[facility] Division of Hospital Medicine  Walt Hagen MD  Available via MS Teams    SUBJECTIVE / OVERNIGHT EVENTS: Overnight events noted. Pt returned to cath lab for MATTEO/new chest pain post staged PCI yesterday. This AM he complains of persistent left sided chest pain associated with severe nausea and poor PO. Denies emesis. Cath did not show any complications from stent placement, with patent stents.     MEDICATIONS  (STANDING):  amLODIPine   Tablet 2.5 milliGRAM(s) Oral daily  aspirin enteric coated 81 milliGRAM(s) Oral daily  atorvastatin 40 milliGRAM(s) Oral at bedtime  clopidogrel Tablet 75 milliGRAM(s) Oral daily  finasteride 5 milliGRAM(s) Oral daily  fluticasone propionate 50 MICROgram(s)/spray Nasal Spray 2 Spray(s) Both Nostrils every 12 hours  latanoprost 0.005% Ophthalmic Solution 1 Drop(s) Both EYES at bedtime  losartan 25 milliGRAM(s) Oral daily  metoprolol tartrate 12.5 milliGRAM(s) Oral every 12 hours  pantoprazole    Tablet 40 milliGRAM(s) Oral before breakfast  tamsulosin 0.4 milliGRAM(s) Oral at bedtime  trimethoprim  160 mG/sulfamethoxazole 800 mG 1 Tablet(s) Oral two times a day    MEDICATIONS  (PRN):      I&O's Summary    30 Aug 2024 07:01  -  31 Aug 2024 07:00  --------------------------------------------------------  IN: 880 mL / OUT: 500 mL / NET: 380 mL    31 Aug 2024 07:01  -  31 Aug 2024 14:39  --------------------------------------------------------  IN: 480 mL / OUT: 0 mL / NET: 480 mL        T(C): 37.2 (08-31-24 @ 11:22), Max: 37.2 (08-31-24 @ 11:22)  HR: 54 (08-31-24 @ 11:22) (52 - 64)  BP: 146/67 (08-31-24 @ 11:22) (112/60 - 178/78)  RR: 18 (08-31-24 @ 11:22) (16 - 18)  SpO2: 97% (08-31-24 @ 11:22) (95% - 100%)        LABS:                        14.1   8.81  )-----------( 200      ( 31 Aug 2024 06:55 )             41.6       RADIOLOGY & ADDITIONAL TESTS:  New Results Reviewed Today:   New Imaging Personally Reviewed Today:  New Electrocardiogram Personally Reviewed Today:  Prior or Outpatient Records Reviewed Today:    COMMUNICATION:  Care Discussed with Consultants/Other Providers and Details of Discussion: Discussed with ACP  Discussions with Patient/Family:  PCP Communication:    GENERAL: NAD, comfortable appearing  HEAD: NCAT  EYES: EOMI, PERRL, conjunctiva and sclera clear  NECK: Supple, No JVD  CHEST/LUNG: Clear to auscultation bilaterally; No wheeze  HEART: Regular rate and rhythm; No murmurs, rubs, or gallops  ABDOMEN: Soft, Nontender, Nondistended; Bowel sounds present  EXTREMITIES:  2+ Peripheral Pulses, No clubbing, cyanosis, or edema  NEUROLOGY: AAOx3; non-focal  SKIN: No rashes or lesions

## 2024-08-31 NOTE — PROGRESS NOTE ADULT - PROBLEM SELECTOR PLAN 5
-c/w losartan 25 mg QD  -c/w amlodipine 2.5 mg QD

## 2024-08-31 NOTE — PROGRESS NOTE ADULT - ASSESSMENT
Pt is an 85 y/o M with PMHx of HTN, GERD, BPH, presenting to the ER with sub-sternal progressively worsening intermittent chest pain x3 weeks
Pt is an 85 y/o M with PMHx of HTN, GERD, BPH, presenting to the ER with sub-sternal progressively worsening intermittent chest pain x3 weeks
84M with PMHx of HTN, GERD, BPH, presenting to the ER with sub-sternal progressively worsening intermittent chest pain x3 weeks, s/p staged PCI to LCx and LAD x 2, complicated by persistent chest pain post PCI. 
Pt is an 83 y/o M with PMHx of HTN, GERD, BPH, presenting to the ER with sub-sternal progressively worsening intermittent chest pain x3 weeks

## 2024-08-31 NOTE — PROGRESS NOTE ADULT - SUBJECTIVE AND OBJECTIVE BOX
DATE OF SERVICE: 24 @ 16:50    Patient is a 84y old  Male who presents with a chief complaint of chest pain (31 Aug 2024 14:35)      INTERVAL HISTORY: c/o generalized discomfort.  Wife at bedisde    REVIEW OF SYSTEMS:  CONSTITUTIONAL: No weakness  EYES/ENT: No visual changes;  No throat pain   NECK: No pain or stiffness  RESPIRATORY: No cough, wheezing; No shortness of breath  CARDIOVASCULAR: No chest pain or palpitations  GASTROINTESTINAL: No abdominal  pain. No nausea, vomiting, or hematemesis  GENITOURINARY: No dysuria, frequency or hematuria  NEUROLOGICAL: No stroke like symptoms  SKIN: No rashes    TELEMETRY Personally reviewed: SB/SR 50s-60s  	  MEDICATIONS:  amLODIPine   Tablet 2.5 milliGRAM(s) Oral daily  losartan 25 milliGRAM(s) Oral daily  metoprolol tartrate 12.5 milliGRAM(s) Oral every 12 hours        PHYSICAL EXAM:  T(C): 37.2 (24 @ 11:22), Max: 37.2 (24 @ 11:22)  HR: 54 (24 @ 11:22) (53 - 64)  BP: 146/67 (24 @ 11:22) (112/60 - 178/78)  RR: 18 (24 @ 11:22) (16 - 18)  SpO2: 97% (24 @ 11:22) (95% - 100%)  Wt(kg): --  I&O's Summary    30 Aug 2024 07:  -  31 Aug 2024 07:00  --------------------------------------------------------  IN: 880 mL / OUT: 500 mL / NET: 380 mL    31 Aug 2024 07:  -  31 Aug 2024 16:50  --------------------------------------------------------  IN: 480 mL / OUT: 0 mL / NET: 480 mL          Appearance: In no distress	  HEENT:    PERRL, EOMI	  Cardiovascular:  S1 S2, No JVD  Respiratory: Lungs clear to auscultation	  Gastrointestinal:  Soft, Non-tender, + BS	  Vascularature:  No edema of LE  Psychiatric: Appropriate affect   Neuro: no acute focal deficits                               14.1   8.81  )-----------( 200      ( 31 Aug 2024 06:55 )             41.6     08-31    131<L>  |  97  |  20  ----------------------------<  117<H>  4.7   |  21<L>  |  1.27    Ca    9.8      31 Aug 2024 14:20          Labs personally reviewed      ASSESSMENT/PLAN: 	    Pt is an 85 y/o M with PMHx of HTN, GERD, BPH, presenting to the ER with chest pain. For the past 2-3 weeks, the patient has had 2-3 episodes of sub-sternal burning chest pain that last <15 minutes and are mild intensity in pain; most recently it occurred last night while walking 3/10 in intensity. At 5AM, the patient was in bed when he began experiencing sudden 10/10 sub-sternal chest pain radiating to the jaw and left arm.     Problem/Plan #1:  Problem: NSTEMI  Plan: Plan for Cardiac cath  r/o CAD   - Chest pain woke patient from sleep described as mid-sternal radiating to jaw. Mild COSTA/chest sensation within exertion for last couple of weeks.   -  chest pain free with no recurrence   - + FHX of CAD (father)  - Trop 10--?18-->242  - Loaded with Plavix, ASA/statin initiated  - s/p cath  with subtotal LCx s/p PCI and plan for staged LAD PCI   -  BRIGID to LAD via RRA.  After returning he had persistent pain, new MATTEO septal . Returned to the cath lab, [facility]  after stents, showed patent both LAD and LCX stent  - per pt pain is marginally better and has  from 10/10 to 5/10, also associated with intermittent nausea.  He is able to ambulate without difficulty or increase in pain      Problem/Plan #2:  Problem: Hypertension  Plan: Continue with home meds  - c/w amlodipine 2.5mg PO daily  - c/w losartan 25mg PO daily    Problem/Plan #3:  Problem: HLD  Plan: Continue with atorvastatin 80mg PO daily  - LDL 92    Problem/Plan #4:  Problem: Dilated Aortic Arch  Plan: Noted as 4.00cm on recent CT  - c/w OP surveillance    Problem/Plan #5:  Problem: Heart Failure with mildly reduced Ejection Fraction  Plan: Likely ICM given regional WMA in LCx territory  - TTE show EF 45-50%, + WMA  - s/p PCI of LCX, plan for staged PCI of LAD   - Will defer further implementation of sHF GDMT given high contrast load with CT studies and cardiac cath  --- c/w losartan 25mg PO daily  --- defer BB given bradycardia (this is patient's baseline lifelong)  --- [id number] c/i given UTI  --- OK to continue low dose amlodipine   - Appears euvolemic  - OP follow up with repeat TTE in 3-6 months following revascularization          Iolani Behrbom, AG-SALONI Traylor DO [facility]  Cardiovascular Medicine  [address] [place] [address], [address]  Available through call or text on [employer] TE[employer]  Office: [phone]   DATE OF SERVICE: 24 @ 16:50    Patient is a 84y old  Male who presents with a chief complaint of chest pain (31 Aug 2024 14:35)      INTERVAL HISTORY: c/o generalized discomfort.  Wife at bedisde    REVIEW OF SYSTEMS:  CONSTITUTIONAL: No weakness  EYES/ENT: No visual changes;  No throat pain   NECK: No pain or stiffness  RESPIRATORY: No cough, wheezing; No shortness of breath  CARDIOVASCULAR: No chest pain or palpitations  GASTROINTESTINAL: No abdominal  pain. No nausea, vomiting, or hematemesis  GENITOURINARY: No dysuria, frequency or hematuria  NEUROLOGICAL: No stroke like symptoms  SKIN: No rashes    TELEMETRY Personally reviewed: SB/SR 50s-60s  	  MEDICATIONS:  amLODIPine   Tablet 2.5 milliGRAM(s) Oral daily  losartan 25 milliGRAM(s) Oral daily  metoprolol tartrate 12.5 milliGRAM(s) Oral every 12 hours        PHYSICAL EXAM:  T(C): 37.2 (24 @ 11:22), Max: 37.2 (24 @ 11:22)  HR: 54 (24 @ 11:22) (53 - 64)  BP: 146/67 (24 @ 11:22) (112/60 - 178/78)  RR: 18 (24 @ 11:22) (16 - 18)  SpO2: 97% (24 @ 11:22) (95% - 100%)  Wt(kg): --  I&O's Summary    30 Aug 2024 07:  -  31 Aug 2024 07:00  --------------------------------------------------------  IN: 880 mL / OUT: 500 mL / NET: 380 mL    31 Aug 2024 07:  -  31 Aug 2024 16:50  --------------------------------------------------------  IN: 480 mL / OUT: 0 mL / NET: 480 mL          Appearance: In no distress	  HEENT:    PERRL, EOMI	  Cardiovascular:  S1 S2, No JVD  Respiratory: Lungs clear to auscultation	  Gastrointestinal:  Soft, Non-tender, + BS	  Vascularature:  No edema of LE  Psychiatric: Appropriate affect   Neuro: no acute focal deficits                               14.1   8.81  )-----------( 200      ( 31 Aug 2024 06:55 )             41.6     08-31    131<L>  |  97  |  20  ----------------------------<  117<H>  4.7   |  21<L>  |  1.27    Ca    9.8      31 Aug 2024 14:20          Labs personally reviewed      ASSESSMENT/PLAN: 	    Pt is an 83 y/o M with PMHx of HTN, GERD, BPH, presenting to the ER with chest pain. For the past 2-3 weeks, the patient has had 2-3 episodes of sub-sternal burning chest pain that last <15 minutes and are mild intensity in pain; most recently it occurred last night while walking 3/10 in intensity. At 5AM, the patient was in bed when he began experiencing sudden 10/10 sub-sternal chest pain radiating to the jaw and left arm.     Problem/Plan #1:  Problem: NSTEMI  Plan: Plan for Cardiac cath  r/o CAD   - Chest pain woke patient from sleep described as mid-sternal radiating to jaw. Mild COSTA/chest sensation within exertion for last couple of weeks.   -  chest pain free with no recurrence   - + FHX of CAD (father)  - Trop 10--?18-->242  - Loaded with Plavix, ASA/statin initiated  - s/p cath  with subtotal LCx s/p PCI and plan for staged LAD PCI   -  BRIGID to LAD via RRA.  After returning he had persistent pain, new MATTEO septal . Returned to the cath lab, [facility]  after stents, showed patent both LAD and LCX stent  - per pt pain is marginally better and has  from 10/10 to 5/10, also associated with intermittent nausea.  He is able to ambulate without difficulty or increase in pain  - start imdur 30mg qd    Problem/Plan #2:  Problem: Hypertension  Plan: Continue with home meds  - c/w amlodipine 2.5mg PO daily  - c/w losartan 25mg PO daily    Problem/Plan #3:  Problem: HLD  Plan: Continue with atorvastatin 80mg PO daily  - LDL 92    Problem/Plan #4:  Problem: Dilated Aortic Arch  Plan: Noted as 4.00cm on recent CT  - c/w OP surveillance    Problem/Plan #5:  Problem: Heart Failure with mildly reduced Ejection Fraction  Plan: Likely ICM given regional WMA in LCx territory  - TTE show EF 45-50%, + WMA  - s/p PCI of LCX, plan for staged PCI of LAD   - Will defer further implementation of sHF GDMT given high contrast load with CT studies and cardiac cath  --- c/w losartan 25mg PO daily  --- defer BB given bradycardia (this is patient's baseline lifelong)  --- [id number] c/i given UTI  --- OK to continue low dose amlodipine   - Appears euvolemic  - OP follow up with repeat TTE in 3-6 months following revascularization          Iolani Behrbom, AG-SALONI Traylor DO [facility]  Cardiovascular Medicine  [address] [place] [address], [address]  Available through call or text on [employer] TEAM[employer]  Office: [phone]

## 2024-08-31 NOTE — PROVIDER CONTACT NOTE (OTHER) - REASON
Request to lower telemetry monitoring heart rate parameter
Pt c/o nausea and dizziness.
Pt is anxious and frustrated about his chest pain and wants to speak with the provider

## 2024-08-31 NOTE — PROVIDER CONTACT NOTE (OTHER) - ACTION/TREATMENT ORDERED:
Provider approved lowering telemetry monitoring heart rate parameter to 44. Telemetry technician Jenaro Fuentes made aware via phone call.
Pt much more calm after speaking with the provider. He wants to speak with the cardiologist later today.
Ordered zofran.

## 2024-08-31 NOTE — PROGRESS NOTE ADULT - PROBLEM SELECTOR PLAN 3
-c/w pantoprazole 40mg QD

## 2024-08-31 NOTE — PROGRESS NOTE ADULT - PROBLEM SELECTOR PLAN 4
-c/w finasteride 5 mg QD  -c/w tamsulosin 0.4 mg QD

## 2024-08-31 NOTE — PROGRESS NOTE ADULT - SUBJECTIVE AND OBJECTIVE BOX
Interventional Cardiology Post Cath Progress Note      Patient is a 84y old  Male who presents with a chief complaint of chest pain (30 Aug 2024 17:45)      Pt was seen at bedside, c/o nausea with /70, HR 60's     Tele: SB @ 50's      Allergies    No Known Allergies    Intolerances        Medications:  amLODIPine   Tablet 2.5 milliGRAM(s) Oral daily  aspirin enteric coated 81 milliGRAM(s) Oral daily  atorvastatin 40 milliGRAM(s) Oral at bedtime  clopidogrel Tablet 75 milliGRAM(s) Oral daily  finasteride 5 milliGRAM(s) Oral daily  fluticasone propionate 50 MICROgram(s)/spray Nasal Spray 2 Spray(s) Both Nostrils every 12 hours  latanoprost 0.005% Ophthalmic Solution 1 Drop(s) Both EYES at bedtime  losartan 25 milliGRAM(s) Oral daily  metoprolol tartrate 12.5 milliGRAM(s) Oral every 12 hours  pantoprazole    Tablet 40 milliGRAM(s) Oral before breakfast  sodium chloride 0.9%. 1000 milliLiter(s) IV Continuous <Continuous>  sodium chloride 0.9%. 1000 milliLiter(s) IV Continuous <Continuous>  sodium chloride 0.9%. 1000 milliLiter(s) IV Continuous <Continuous>  tamsulosin 0.4 milliGRAM(s) Oral at bedtime  trimethoprim  160 mG/sulfamethoxazole 800 mG 1 Tablet(s) Oral two times a day      Vitals:  T(C): 37.2 (24 @ 11:22), Max: 37.2 (24 @ 11:22)  HR: 54 (24 @ 11:22) (52 - 64)  BP: 146/67 (24 @ 11:22) (112/60 - 178/78)  BP(mean): --  RR: 18 (24 @ 11:22) (16 - 18)  SpO2: 97% (24 @ 11:22) (95% - 100%)  Wt(kg): --  Daily     Daily   I&O's Summary    30 Aug 2024 07:01  -  31 Aug 2024 07:00  --------------------------------------------------------  IN: 880 mL / OUT: 500 mL / NET: 380 mL          Review of System:   Denies chest pain, SOB, dizziness or palpitation  All other systems: no other complaints offered         Physical Exam:  Appearance: Normal  Eyes: PERRL, EOMI  HENT: Normal oral muscosa, NC/AT  Cardiovascular: S1S2, RRR, No M/R/G, no JVD, No Lower extremity edema  Procedural RRA Access Site: No hematoma, Non-tender to palpation, 2+ pulse, No bruit, No Ecchymosis  Respiratory: Clear to auscultation bilaterally  Gastrointestinal: Soft, Non tender, Normal Bowel Sounds  Musculoskeletal: No clubbing, No joint deformity   Neurologic: Non-focal  Lymphatic: No lymphadenopathy  Psychiatry: AAOx3, Mood & affect appropriate  Skin: No rashes, No ecchymoses, No cyanosis          PTT - ( 29 Aug 2024 13:07 )  PTT:58.9 sec        Lipid panel Total Cholesterol: 160  LDL: --  HDL: 50  T      Hgb A1c A1C with Estimated Average Glucose Result: 5.4 % ( @ 05:58)        ECG:    Echo:  < from: TTE W or WO Ultrasound Enhancing Agent (24 @ 10:56) >   1. Left ventricular cavity is normal in size. Left ventricular wall thickness is normal. Left ventricular systolic function is mildly decreased with an ejection fraction visually estimated at 45 to 50 %. Regional wall motion abnormalities present.   2. Basal and mid anterolateral wall and basal and mid inferolateral wall are abnormal.   3. Normal right ventricular cavity size and normal right ventricular systolic function.   4. Estimated pulmonary artery systolic pressure is 30 mmHg.   5. No pericardial effusion seen.   6. Compared to the transthoracic echocardiogram performed on 2024, there is a new wall motion abnormality in the left circumflex artery territory.    < end of copied text >    Stress Testing:     Cath:  < from: Cardiac Catheterization (24 @ 17:54) >  The coronary circulation is right dominant.    LM : left main artery: Angiography shows no disease.    LAD: Proximal left anterior descending: There is a 40 % stenosis. Mid left anterior descending: There is a 90 % stenosis. First diagonal: There is a 40 % stenosis.    LCX: Proximal circumflex: There is a 99 % stenosis.    RCA: Mid right coronary artery: There is a 40 % stenosis.  < end of copied text >    < from: Cardiac Catheterization (24 @ 17:54) >  Successful PCI with BRIGID to subtotal proximal LCX. Plan staged LAD PCI tomorrow.  DAPT for 1 year < end of copied text >    < from: Cardiac Catheterization (24 @ 16:39) >  S/p staged PCI with successful BRIGID x1 to proximal LAD and BRIGID x1 to mid LAD.    Imaging:    Assessment and Plan  84y Male PMH hypertension, GERD admitted with chest pain s/p p PCI DESx 1 to LCx via Right radial artery on , staged for LAD on   - Continue ASA 81mg daily and Plavix 75mg daily  - Continue atorvastatin daily  - Continue beta blocker   - Lipid and glucose management   - F/u with cardiologist or [facility] cardiology office ([phone]) in 2 weeks  All other issues as per primary team     Discharge instruction discussed with written instruction as below;  - Benefit and Risk of ASA/Plavix, Activity Cath Access site care, f/u care, reportable signs ans symptoms   - Medication adherence stressed to patient with discussion of risks of non-compliance including death and stent thrombosis  - Recommend a heart healthy diet which includes a variety of fruits and vegetables, whole grains, low fat dairy products, legumes and skinless poultry and fish; food prepared with little or no salt and minimize processed foods  - Avoid using NSAIDs (Aleve, Motrin, Ibuprofen, Naproxen) while on DAPT, please utilize Tylenol for pain control (not to exceed 4gm in 24 hours)    - pt understood and verbalized         Regan Kilgore, [facility]-BC  #[id number] or team     Please check [email or web address] password [email or web address] for cardiology service schedule and contact information via TEAMS.    Time spent on this encounter >25 minutes                  Interventional Cardiology Post Cath Progress Note      Patient is a 84y old  Male who presents with a chief complaint of chest pain (30 Aug 2024 17:45)      Pt was seen at bedside, c/o nausea with /70, HR 60's   reporting his chest pain has decreased after stent to 4/10 from 10/10. Pt had 3rd cath after c/o same discomfort on  after LAD stent, that showed Patent both LAD and LCX stent.     Tele: SB @ 50's    Allergies    No Known Allergies    Intolerances        Medications:  amLODIPine   Tablet 2.5 milliGRAM(s) Oral daily  aspirin enteric coated 81 milliGRAM(s) Oral daily  atorvastatin 40 milliGRAM(s) Oral at bedtime  clopidogrel Tablet 75 milliGRAM(s) Oral daily  finasteride 5 milliGRAM(s) Oral daily  fluticasone propionate 50 MICROgram(s)/spray Nasal Spray 2 Spray(s) Both Nostrils every 12 hours  latanoprost 0.005% Ophthalmic Solution 1 Drop(s) Both EYES at bedtime  losartan 25 milliGRAM(s) Oral daily  metoprolol tartrate 12.5 milliGRAM(s) Oral every 12 hours  pantoprazole    Tablet 40 milliGRAM(s) Oral before breakfast  sodium chloride 0.9%. 1000 milliLiter(s) IV Continuous <Continuous>  sodium chloride 0.9%. 1000 milliLiter(s) IV Continuous <Continuous>  sodium chloride 0.9%. 1000 milliLiter(s) IV Continuous <Continuous>  tamsulosin 0.4 milliGRAM(s) Oral at bedtime  trimethoprim  160 mG/sulfamethoxazole 800 mG 1 Tablet(s) Oral two times a day      Vitals:  T(C): 37.2 (24 @ 11:22), Max: 37.2 (24 @ 11:22)  HR: 54 (24 @ 11:22) (52 - 64)  BP: 146/67 (24 @ 11:22) (112/60 - 178/78)  BP(mean): --  RR: 18 (24 @ 11:22) (16 - 18)  SpO2: 97% (24 @ 11:22) (95% - 100%)  Wt(kg): --  Daily     Daily   I&O's Summary    30 Aug 2024 07:01  -  31 Aug 2024 07:00  --------------------------------------------------------  IN: 880 mL / OUT: 500 mL / NET: 380 mL          Review of System:   Denies chest pain, SOB, dizziness or palpitation  All other systems: no other complaints offered         Physical Exam:  Appearance: Normal  Eyes: PERRL, EOMI  HENT: Normal oral muscosa, NC/AT  Cardiovascular: S1S2, RRR, No M/R/G, no JVD, No Lower extremity edema  Procedural RRA Access Site: No hematoma, Non-tender to palpation, 2+ pulse, No bruit, No Ecchymosis  Respiratory: Clear to auscultation bilaterally  Gastrointestinal: Soft, Non tender, Normal Bowel Sounds  Musculoskeletal: No clubbing, No joint deformity   Neurologic: Non-focal  Lymphatic: No lymphadenopathy  Psychiatry: AAOx3, Mood & affect appropriate  Skin: No rashes, No ecchymoses, No cyanosis          PTT - ( 29 Aug 2024 13:07 )  PTT:58.9 sec        Lipid panel Total Cholesterol: 160  LDL: --  HDL: 50  T      Hgb A1c A1C with Estimated Average Glucose Result: 5.4 % ( @ 05:58)        ECG:    Echo:  < from: TTE W or WO Ultrasound Enhancing Agent (24 @ 10:56) >   1. Left ventricular cavity is normal in size. Left ventricular wall thickness is normal. Left ventricular systolic function is mildly decreased with an ejection fraction visually estimated at 45 to 50 %. Regional wall motion abnormalities present.   2. Basal and mid anterolateral wall and basal and mid inferolateral wall are abnormal.   3. Normal right ventricular cavity size and normal right ventricular systolic function.   4. Estimated pulmonary artery systolic pressure is 30 mmHg.   5. No pericardial effusion seen.   6. Compared to the transthoracic echocardiogram performed on 2024, there is a new wall motion abnormality in the left circumflex artery territory.    < end of copied text >    Stress Testing:     Cath:  < from: Cardiac Catheterization (24 @ 17:54) >  The coronary circulation is right dominant.    LM : left main artery: Angiography shows no disease.    LAD: Proximal left anterior descending: There is a 40 % stenosis. Mid left anterior descending: There is a 90 % stenosis. First diagonal: There is a 40 % stenosis.    LCX: Proximal circumflex: There is a 99 % stenosis.    RCA: Mid right coronary artery: There is a 40 % stenosis.  < end of copied text >    < from: Cardiac Catheterization (24 @ 17:54) >  Successful PCI with BRIGID to subtotal proximal LCX. Plan staged LAD PCI tomorrow.  DAPT for 1 year < end of copied text >    < from: Cardiac Catheterization (24 @ 16:39) >  S/p staged PCI with successful BRIGID x1 to proximal LAD and BRIGID x1 to mid LAD.    Imaging:    Assessment and Plan  84y Male PMH hypertension, GERD admitted with chest pain s/p p PCI DESx 1 to LCx via Right radial artery on , staged for LAD on   - Continue ASA 81mg daily and Plavix 75mg daily  - Continue atorvastatin daily  - Continue beta blocker   - Lipid and glucose management   - F/u with cardiologist or [facility] cardiology office ([phone]) in 2 weeks    -  All other issues as per primary team     Discharge instruction discussed with written instruction as below;  - Benefit and Risk of ASA/Plavix, Activity Cath Access site care, f/u care, reportable signs ans symptoms   - Medication adherence stressed to patient with discussion of risks of non-compliance including death and stent thrombosis  - Recommend a heart healthy diet which includes a variety of fruits and vegetables, whole grains, low fat dairy products, legumes and skinless poultry and fish; food prepared with little or no salt and minimize processed foods  - Avoid using NSAIDs (Aleve, Motrin, Ibuprofen, Naproxen) while on DAPT, please utilize Tylenol for pain control (not to exceed 4gm in 24 hours)    - pt understood and verbalized         Regan Kilgore, [facility]-BC  #[id number] or team     Please check [email or web address] password [email or web address] for cardiology service schedule and contact information via TEAMS.    Time spent on this encounter >25 minutes

## 2024-08-31 NOTE — PROGRESS NOTE ADULT - PROBLEM SELECTOR PLAN 2
E. Coli UTI sensitive to Bactrim and Ciprofloxacin    PLAN:  -c/w Bactrim DS BID as long as patient is still eating per Urology (if NPO see note)
E. Coli UTI sensitive to Bactrim and Ciprofloxacin    PLAN:  -c/w Bactrim DS BID as long as patient is still eating per Urology (if NPO see note)
E. Coli UTI sensitive to Bactrim and Ciprofloxacin    PLAN:  - bactrim x 5 days
E. Coli UTI sensitive to Bactrim and Ciprofloxacin    PLAN:  -c/w Bactrim DS BID as long as patient is still eating per Urology (if NPO see note)

## 2024-08-31 NOTE — PROGRESS NOTE ADULT - PROBLEM SELECTOR PLAN 1
intermittent chest pain x 3 weeks  - s/p IV heparin, plavix load 600mg per ACS protocol  - s/p [facility] 8/29 with PCI to LCx, [facility] 8/30 with 2x stents to LAD  - [facility] 8/30 after stents showed patent stents  - continue asa, plavix, lipitor  - TTE with LVEF 45-50%  - unclear etiology of persistent chest pain w/ EKG changes, now associated with nausea  - if any worsening pain, inform cardiology stat
intermittent chest pain x 3 weeks  - initially troponin negative but overnight, trended up from 18 to 200s  - continue IV heparin for ACS protocol  - s/p plavix 600mg   - plan for [facility] today  - continue asa 81mg, lipitor 40mg daily  - appreciate cardiology recs
intermittent chest pain x 3 weeks  - initially troponin negative but overnight, trended up from 18 to 200s  - start IV heparin for ACS protocol  - s/p plavix 600mg   - plan for [facility] today  - continue asa 81mg, lipitor 40mg daily  - appreciate cardiology recs
intermittent chest pain x 3 weeks  - initially troponin negative but trended up from 18 to 200s  - s/p IV heparin, plavix load 600mg per ACS protocol  - s/p [facility] 8/29 with PCI to LCx  - plan for staged PCI to LAD today  - continue asa, plavix, lipitor  - TTE with LVEF 45-50%

## 2024-08-31 NOTE — PROVIDER CONTACT NOTE (OTHER) - SITUATION
Pt c/o nausea and dizziness. No PRN.    Also HR in the high 50s. Ok to administer metoprolol or hold?
Request to lower telemetry monitoring heart rate parameter.
Pt is anxious and frustrated about his chest pain and wants to speak with the provider

## 2024-08-31 NOTE — PROGRESS NOTE ADULT - NSPROGADDITIONALINFOA_GEN_ALL_CORE
above plans discussed with medicine ACP Ashley
above plans discussed with medicine ACP PK
The necessity of the time spent during the encounter on this date of service was due to:   - Ordering, reviewing, and interpreting labs, testing, and imaging  - Independently obtaining a review of systems and performing a physical exam  - Reviewing prior hospitalization and where necessary, outpatient records  - Reviewing consultant recommendations/communicating with consultants  - Counselling and educating patient and family regarding interpretation of aforementioned items and plan of care    Time-based billing (NON-critical care). Total minutes spent: 60
above plans discussed with medicine ACP Ashley

## 2024-08-31 NOTE — PROVIDER CONTACT NOTE (OTHER) - ASSESSMENT
VS WNL. R radial and groin cath sites CDI. Pt is A&Ox4.
Patient AOx4 in bed. Wife bedside.
Pt c/o 5/10 substernal chest pain, mild nausea, and dizziness when ambulating. VS WNL. R groin and radial cath sites CDI, no hematoma noted. Offered to get him meds which pt declined stating that he doesn't "want to just take meds".

## 2024-08-31 NOTE — PROGRESS NOTE ADULT - PROBLEM SELECTOR PLAN 7
Diet: DASH  DVT Prophylaxis: low risk  Dispo: Active
Diet: Regular diet for now  DVT Prophylaxis: low risk  Dispo: Active
Diet: Regular diet for now  DVT Prophylaxis: low risk  Dispo: Active
Diet: DASH  DVT Prophylaxis: low risk  Dispo: Active

## 2024-08-31 NOTE — PROGRESS NOTE ADULT - PROBLEM SELECTOR PLAN 6
-c/w latanoprost 0.005% ophthalmic solution QD both eyes at bedtime

## 2024-09-01 VITALS
HEART RATE: 55 BPM | RESPIRATION RATE: 18 BRPM | OXYGEN SATURATION: 97 % | DIASTOLIC BLOOD PRESSURE: 70 MMHG | TEMPERATURE: 98 F | SYSTOLIC BLOOD PRESSURE: 123 MMHG

## 2024-09-01 LAB
ANION GAP SERPL CALC-SCNC: 11 MMOL/L — SIGNIFICANT CHANGE UP (ref 5–17)
BUN SERPL-MCNC: 22 MG/DL — SIGNIFICANT CHANGE UP (ref 7–23)
CALCIUM SERPL-MCNC: 9.9 MG/DL — SIGNIFICANT CHANGE UP (ref 8.4–10.5)
CHLORIDE SERPL-SCNC: 98 MMOL/L — SIGNIFICANT CHANGE UP (ref 96–108)
CO2 SERPL-SCNC: 22 MMOL/L — SIGNIFICANT CHANGE UP (ref 22–31)
CREAT SERPL-MCNC: 1.2 MG/DL — SIGNIFICANT CHANGE UP (ref 0.5–1.3)
EGFR: 60 ML/MIN/1.73M2 — SIGNIFICANT CHANGE UP
GLUCOSE SERPL-MCNC: 112 MG/DL — HIGH (ref 70–99)
HCT VFR BLD CALC: 39.5 % — SIGNIFICANT CHANGE UP (ref 39–50)
HGB BLD-MCNC: 13.3 G/DL — SIGNIFICANT CHANGE UP (ref 13–17)
MAGNESIUM SERPL-MCNC: 2.2 MG/DL — SIGNIFICANT CHANGE UP (ref 1.6–2.6)
MCHC RBC-ENTMCNC: 31.8 PG — SIGNIFICANT CHANGE UP (ref 27–34)
MCHC RBC-ENTMCNC: 33.7 GM/DL — SIGNIFICANT CHANGE UP (ref 32–36)
MCV RBC AUTO: 94.5 FL — SIGNIFICANT CHANGE UP (ref 80–100)
NRBC # BLD: 0 /100 WBCS — SIGNIFICANT CHANGE UP (ref 0–0)
PHOSPHATE SERPL-MCNC: 2.6 MG/DL — SIGNIFICANT CHANGE UP (ref 2.5–4.5)
PLATELET # BLD AUTO: 204 K/UL — SIGNIFICANT CHANGE UP (ref 150–400)
POTASSIUM SERPL-MCNC: 4.7 MMOL/L — SIGNIFICANT CHANGE UP (ref 3.5–5.3)
POTASSIUM SERPL-SCNC: 4.7 MMOL/L — SIGNIFICANT CHANGE UP (ref 3.5–5.3)
RBC # BLD: 4.18 M/UL — LOW (ref 4.2–5.8)
RBC # FLD: 13.6 % — SIGNIFICANT CHANGE UP (ref 10.3–14.5)
SODIUM SERPL-SCNC: 131 MMOL/L — LOW (ref 135–145)
WBC # BLD: 9.4 K/UL — SIGNIFICANT CHANGE UP (ref 3.8–10.5)
WBC # FLD AUTO: 9.4 K/UL — SIGNIFICANT CHANGE UP (ref 3.8–10.5)

## 2024-09-01 PROCEDURE — 84100 ASSAY OF PHOSPHORUS: CPT

## 2024-09-01 PROCEDURE — 36415 COLL VENOUS BLD VENIPUNCTURE: CPT

## 2024-09-01 PROCEDURE — C1874: CPT

## 2024-09-01 PROCEDURE — 83735 ASSAY OF MAGNESIUM: CPT

## 2024-09-01 PROCEDURE — C1887: CPT

## 2024-09-01 PROCEDURE — 96374 THER/PROPH/DIAG INJ IV PUSH: CPT

## 2024-09-01 PROCEDURE — 93454 CORONARY ARTERY ANGIO S&I: CPT | Mod: 59

## 2024-09-01 PROCEDURE — 83036 HEMOGLOBIN GLYCOSYLATED A1C: CPT

## 2024-09-01 PROCEDURE — 80048 BASIC METABOLIC PNL TOTAL CA: CPT

## 2024-09-01 PROCEDURE — 71275 CT ANGIOGRAPHY CHEST: CPT | Mod: MC

## 2024-09-01 PROCEDURE — 80061 LIPID PANEL: CPT

## 2024-09-01 PROCEDURE — 85730 THROMBOPLASTIN TIME PARTIAL: CPT

## 2024-09-01 PROCEDURE — 85610 PROTHROMBIN TIME: CPT

## 2024-09-01 PROCEDURE — 99285 EMERGENCY DEPT VISIT HI MDM: CPT

## 2024-09-01 PROCEDURE — C1894: CPT

## 2024-09-01 PROCEDURE — 96375 TX/PRO/DX INJ NEW DRUG ADDON: CPT

## 2024-09-01 PROCEDURE — 99239 HOSP IP/OBS DSCHRG MGMT >30: CPT

## 2024-09-01 PROCEDURE — C8929: CPT

## 2024-09-01 PROCEDURE — 93005 ELECTROCARDIOGRAM TRACING: CPT

## 2024-09-01 PROCEDURE — 85025 COMPLETE CBC W/AUTO DIFF WBC: CPT

## 2024-09-01 PROCEDURE — C9600: CPT | Mod: LD

## 2024-09-01 PROCEDURE — 82553 CREATINE MB FRACTION: CPT

## 2024-09-01 PROCEDURE — 74174 CTA ABD&PLVS W/CONTRAST: CPT | Mod: MC

## 2024-09-01 PROCEDURE — 94640 AIRWAY INHALATION TREATMENT: CPT

## 2024-09-01 PROCEDURE — C1769: CPT

## 2024-09-01 PROCEDURE — 85027 COMPLETE CBC AUTOMATED: CPT

## 2024-09-01 PROCEDURE — 80053 COMPREHEN METABOLIC PANEL: CPT

## 2024-09-01 PROCEDURE — 83880 ASSAY OF NATRIURETIC PEPTIDE: CPT

## 2024-09-01 PROCEDURE — 84484 ASSAY OF TROPONIN QUANT: CPT

## 2024-09-01 PROCEDURE — C1725: CPT

## 2024-09-01 PROCEDURE — 71045 X-RAY EXAM CHEST 1 VIEW: CPT

## 2024-09-01 RX ORDER — ASPIRIN 81 MG
1 TABLET, DELAYED RELEASE (ENTERIC COATED) ORAL
Qty: 30 | Refills: 0
Start: 2024-09-01 | End: 2024-09-30

## 2024-09-01 RX ORDER — ISOSORBIDE MONONITRATE 30 MG/1
1 TABLET, EXTENDED RELEASE ORAL
Qty: 30 | Refills: 0
Start: 2024-09-01 | End: 2024-09-30

## 2024-09-01 RX ADMIN — FINASTERIDE 5 MILLIGRAM(S): 1 TABLET, FILM COATED ORAL at 11:46

## 2024-09-01 RX ADMIN — SULFAMETHOXAZOLE AND TRIMETHOPRIM 1 TABLET(S): 800; 160 TABLET ORAL at 05:37

## 2024-09-01 RX ADMIN — AMLODIPINE BESYLATE 2.5 MILLIGRAM(S): 10 TABLET ORAL at 05:37

## 2024-09-01 RX ADMIN — FLUTICASONE PROPIONATE 2 SPRAY(S): 50 SPRAY, METERED NASAL at 05:37

## 2024-09-01 RX ADMIN — LOSARTAN POTASSIUM 25 MILLIGRAM(S): 50 TABLET ORAL at 05:37

## 2024-09-01 RX ADMIN — Medication 40 MILLIGRAM(S): at 05:37

## 2024-09-01 RX ADMIN — Medication 81 MILLIGRAM(S): at 11:49

## 2024-09-01 RX ADMIN — Medication 75 MILLIGRAM(S): at 11:46

## 2024-09-01 RX ADMIN — ISOSORBIDE MONONITRATE 30 MILLIGRAM(S): 30 TABLET, EXTENDED RELEASE ORAL at 11:46

## 2024-09-01 NOTE — DISCHARGE NOTE NURSING/CASE MANAGEMENT/SOCIAL WORK - NSDCPEFALRISK_GEN_ALL_CORE
For information on Fall & Injury Prevention, visit: [email or web address] OR  [email or web address] OR  https://www.cdc.gov/steadi/patient.html denies

## 2024-09-01 NOTE — PROGRESS NOTE ADULT - PROVIDER SPECIALTY LIST ADULT
03/12/2020                 [facility]  [address]  [facility] [address]  Phone: [phone]   03/12/2020    Patient: Betsy Ruff   YOB: 2018   Date of Visit: 3/12/2020       To Whom it May Concern:    Betsy Ruff was seen in my clinic on 3/12/2020. She may return to school on 03/13/2020.    If you have any questions or concerns, please don't hesitate to call.    Sincerely,         Mercedes Whiteside MA     
Intervent Cardiology
Cardiology
Intervent Cardiology
Hospitalist

## 2024-09-01 NOTE — PROGRESS NOTE ADULT - NS_MD_PANP_GEN_ALL_CORE
Attending and PA/NP shared services statement (NON-critical care):
Follow up with your Primary Care Physician upon discharge from the hospital to have Thyroid Function Test, check Vitamin D25 level/DXA performed as outpatient.   Follow up with Endocrinology Clinic within 1 week of discharge from Hospital---Call [phone] to schedule appointment.
Follow up with your Primary Care Physician upon discharge from the hospital to have Thyroid Function Test, check Vitamin D25 level/DEXA performed as outpatient.   Follow up with Endocrinology Clinic within 1 week of discharge from Hospital--->Call [phone] to schedule appointment.
Follow up with your Primary Care Physician upon discharge from the hospital to have DEXA performed as outpatient.   Follow up with Endocrinology Clinic within 1 week of discharge from Hospital--->Call [phone] to schedule appointment.
Follow up Dr Bateman 10-14 days from SURGERY  re: wound check / suture/staple  removal   Follow up with your Primary Care Physician upon discharge from the hospital to have DEXA performed as outpatient.   Follow up with Endocrinology Clinic on [address] within 1 week of discharge from Hospital--->Call [phone] to schedule appointment.
Follow up Dr Bateman 10-14 days from SURGERY  re: wound check / suture/staple  removal   Follow up with your Primary Care Physician upon discharge from the hospital to have DEXA performed as outpatient.   Follow up with Endocrinologist Dr Paul on 5/18/18 @0900 at [address] [facility]

## 2024-09-01 NOTE — DISCHARGE NOTE NURSING/CASE MANAGEMENT/SOCIAL WORK - PATIENT PORTAL LINK FT
You can access the FollowMyHealth Patient Portal offered by [facility] by registering at the following website: [email or web address]. By joining [employer]’s FollowMyHealth portal, you will also be able to view your health information using other applications (apps) compatible with our system.

## 2024-09-01 NOTE — PROGRESS NOTE ADULT - SUBJECTIVE AND OBJECTIVE BOX
DATE OF SERVICE: 24 @ 19:08    Patient is a 84y old  Male who presents with a chief complaint of chest pain (31 Aug 2024 16:50)      INTERVAL HISTORY: feels ok    REVIEW OF SYSTEMS:  CONSTITUTIONAL: No weakness  EYES/ENT: No visual changes;  No throat pain   NECK: No pain or stiffness  RESPIRATORY: No cough, wheezing; No shortness of breath  CARDIOVASCULAR: No chest pain or palpitations  GASTROINTESTINAL: No abdominal  pain. No nausea, vomiting, or hematemesis  GENITOURINARY: No dysuria, frequency or hematuria  NEUROLOGICAL: No stroke like symptoms  SKIN: No rashes      MEDICATIONS:  amLODIPine   Tablet 2.5 milliGRAM(s) Oral daily  isosorbide   mononitrate ER Tablet (IMDUR) 30 milliGRAM(s) Oral daily  losartan 25 milliGRAM(s) Oral daily        PHYSICAL EXAM:  T(C): 36.8 (24 @ 11:29), Max: 37.1 (24 @ 04:00)  HR: 55 (24 @ 11:29) (48 - 57)  BP: 123/70 (24 @ 11:29) (123/70 - 136/69)  RR: 18 (24 @ 11:29) (18 - 18)  SpO2: 97% (24 @ 11:29) (94% - 97%)  Wt(kg): --  I&O's Summary    31 Aug 2024 07:01  -  01 Sep 2024 07:00  --------------------------------------------------------  IN: 720 mL / OUT: 0 mL / NET: 720 mL          Appearance: In no distress	  HEENT:    PERRL, EOMI	  Cardiovascular:  S1 S2, No JVD  Respiratory: Lungs clear to auscultation	  Gastrointestinal:  Soft, Non-tender, + BS	  Vascularature:  No edema of LE  Psychiatric: Appropriate affect   Neuro: no acute focal deficits                               13.3   9.40  )-----------( 204      ( 01 Sep 2024 11:25 )             39.5         131<L>  |  98  |  22  ----------------------------<  112<H>  4.7   |  22  |  1.20    Ca    9.9      01 Sep 2024 11:25  Phos  2.6       Mg     2.2               Labs personally reviewed      ASSESSMENT/PLAN: 	      Pt is an 83 y/o M with PMHx of HTN, GERD, BPH, presenting to the ER with chest pain. For the past 2-3 weeks, the patient has had 2-3 episodes of sub-sternal burning chest pain that last <15 minutes and are mild intensity in pain; most recently it occurred last night while walking 3/10 in intensity. At 5AM, the patient was in bed when he began experiencing sudden 10/10 sub-sternal chest pain radiating to the jaw and left arm.     Problem/Plan #1:  Problem: NSTEMI  Plan: Plan for Cardiac cath  r/o CAD   - Chest pain woke patient from sleep described as mid-sternal radiating to jaw. Mild COSTA/chest sensation within exertion for last couple of weeks.   -  chest pain free with no recurrence   - + FHX of CAD (father)  - Trop 10--?18-->242  - Loaded with Plavix, ASA/statin initiated  - s/p cath  with subtotal LCx s/p PCI and plan for staged LAD PCI   -  BRIGID to LAD via RRA.  After returning he had persistent pain, new MATTEO septal . Returned to the cath lab, [facility]  after stents, showed patent both LAD and LCX stent  - per pt pain is marginally better and has  from 10/10 to 5/10, also associated with intermittent nausea.  He is able to ambulate without difficulty or increase in pain  - start imdur 30mg qd    Problem/Plan #2:  Problem: Hypertension  Plan: Continue with home meds  - c/w amlodipine 2.5mg PO daily  - c/w losartan 25mg PO daily    Problem/Plan #3:  Problem: HLD  Plan: Continue with atorvastatin 80mg PO daily  - LDL 92    Problem/Plan #4:  Problem: Dilated Aortic Arch  Plan: Noted as 4.00cm on recent CT  - c/w OP surveillance    Problem/Plan #5:  Problem: Heart Failure with mildly reduced Ejection Fraction  Plan: Likely ICM given regional WMA in LCx territory  - TTE show EF 45-50%, + WMA  - s/p PCI of LCX, plan for staged PCI of LAD   - Will defer further implementation of sHF GDMT given high contrast load with CT studies and cardiac cath  --- c/w losartan 25mg PO daily  --- defer BB given bradycardia (this is patient's baseline lifelong)  --- [id number] c/i given UTI  --- OK to continue low dose amlodipine   - Appears euvolemic  - OP follow up with repeat TTE in 3-6 months following revascularization            Iolani Behrbom, AG-SALONI Traylor DO [facility]  Cardiovascular Medicine  [address], [address]  Available through call or text on [employer]  Office: [phone]

## 2024-09-02 ENCOUNTER — INPATIENT (INPATIENT)
Facility: HOSPITAL | Age: 84
LOS: 0 days | Discharge: HOME | DRG: 641 | End: 2024-09-03
Attending: INTERNAL MEDICINE | Admitting: STUDENT IN AN ORGANIZED HEALTH CARE EDUCATION/TRAINING PROGRAM
Payer: MEDICARE

## 2024-09-02 VITALS
WEIGHT: 147.05 LBS | DIASTOLIC BLOOD PRESSURE: 55 MMHG | SYSTOLIC BLOOD PRESSURE: 116 MMHG | TEMPERATURE: 98 F | HEART RATE: 54 BPM | HEIGHT: 67 IN | OXYGEN SATURATION: 98 % | RESPIRATION RATE: 16 BRPM

## 2024-09-02 DIAGNOSIS — E87.1 HYPO-OSMOLALITY AND HYPONATREMIA: ICD-10-CM

## 2024-09-02 DIAGNOSIS — N17.9 ACUTE KIDNEY FAILURE, UNSPECIFIED: ICD-10-CM

## 2024-09-02 DIAGNOSIS — Z29.9 ENCOUNTER FOR PROPHYLACTIC MEASURES, UNSPECIFIED: ICD-10-CM

## 2024-09-02 DIAGNOSIS — Z90.49 ACQUIRED ABSENCE OF OTHER SPECIFIED PARTS OF DIGESTIVE TRACT: Chronic | ICD-10-CM

## 2024-09-02 DIAGNOSIS — I25.10 ATHEROSCLEROTIC HEART DISEASE OF NATIVE CORONARY ARTERY WITHOUT ANGINA PECTORIS: ICD-10-CM

## 2024-09-02 DIAGNOSIS — Z90.89 ACQUIRED ABSENCE OF OTHER ORGANS: Chronic | ICD-10-CM

## 2024-09-02 LAB
ADD ON TEST-SPECIMEN IN LAB: SIGNIFICANT CHANGE UP
ADD ON TEST-SPECIMEN IN LAB: SIGNIFICANT CHANGE UP
ALBUMIN SERPL ELPH-MCNC: 3.5 G/DL — SIGNIFICANT CHANGE UP (ref 3.3–5)
ALBUMIN SERPL ELPH-MCNC: 3.8 G/DL — SIGNIFICANT CHANGE UP (ref 3.3–5)
ALP SERPL-CCNC: 106 U/L — SIGNIFICANT CHANGE UP (ref 40–120)
ALP SERPL-CCNC: 121 U/L — HIGH (ref 40–120)
ALT FLD-CCNC: 46 U/L — HIGH (ref 10–45)
ALT FLD-CCNC: 51 U/L — HIGH (ref 10–45)
ANION GAP SERPL CALC-SCNC: 10 MMOL/L — SIGNIFICANT CHANGE UP (ref 5–17)
ANION GAP SERPL CALC-SCNC: 11 MMOL/L — SIGNIFICANT CHANGE UP (ref 5–17)
APPEARANCE UR: CLEAR — SIGNIFICANT CHANGE UP
APPEARANCE UR: CLEAR — SIGNIFICANT CHANGE UP
APTT BLD: 24.6 SEC — SIGNIFICANT CHANGE UP (ref 24.5–35.6)
AST SERPL-CCNC: 52 U/L — HIGH (ref 10–40)
AST SERPL-CCNC: 60 U/L — HIGH (ref 10–40)
BACTERIA # UR AUTO: NEGATIVE /HPF — SIGNIFICANT CHANGE UP
BASOPHILS # BLD AUTO: 0.03 K/UL — SIGNIFICANT CHANGE UP (ref 0–0.2)
BASOPHILS NFR BLD AUTO: 0.3 % — SIGNIFICANT CHANGE UP (ref 0–2)
BILIRUB SERPL-MCNC: 0.4 MG/DL — SIGNIFICANT CHANGE UP (ref 0.2–1.2)
BILIRUB SERPL-MCNC: 0.5 MG/DL — SIGNIFICANT CHANGE UP (ref 0.2–1.2)
BILIRUB UR-MCNC: NEGATIVE — SIGNIFICANT CHANGE UP
BILIRUB UR-MCNC: NEGATIVE — SIGNIFICANT CHANGE UP
BUN SERPL-MCNC: 29 MG/DL — HIGH (ref 7–23)
BUN SERPL-MCNC: 33 MG/DL — HIGH (ref 7–23)
CALCIUM SERPL-MCNC: 9 MG/DL — SIGNIFICANT CHANGE UP (ref 8.4–10.5)
CALCIUM SERPL-MCNC: 9.3 MG/DL — SIGNIFICANT CHANGE UP (ref 8.4–10.5)
CAST: 0 /LPF — SIGNIFICANT CHANGE UP (ref 0–4)
CHLORIDE SERPL-SCNC: 92 MMOL/L — LOW (ref 96–108)
CHLORIDE SERPL-SCNC: 94 MMOL/L — LOW (ref 96–108)
CK MB BLD-MCNC: 2.6 % — SIGNIFICANT CHANGE UP (ref 0–3.5)
CK MB CFR SERPL CALC: 4.5 NG/ML — SIGNIFICANT CHANGE UP (ref 0–6.7)
CK SERPL-CCNC: 175 U/L — SIGNIFICANT CHANGE UP (ref 30–200)
CO2 SERPL-SCNC: 20 MMOL/L — LOW (ref 22–31)
CO2 SERPL-SCNC: 21 MMOL/L — LOW (ref 22–31)
COLOR SPEC: YELLOW — SIGNIFICANT CHANGE UP
COLOR SPEC: YELLOW — SIGNIFICANT CHANGE UP
CREAT ?TM UR-MCNC: 78 MG/DL — SIGNIFICANT CHANGE UP
CREAT SERPL-MCNC: 1.42 MG/DL — HIGH (ref 0.5–1.3)
CREAT SERPL-MCNC: 1.54 MG/DL — HIGH (ref 0.5–1.3)
DIFF PNL FLD: NEGATIVE — SIGNIFICANT CHANGE UP
DIFF PNL FLD: NEGATIVE — SIGNIFICANT CHANGE UP
EGFR: 44 ML/MIN/1.73M2 — LOW
EGFR: 49 ML/MIN/1.73M2 — LOW
EOSINOPHIL # BLD AUTO: 0.07 K/UL — SIGNIFICANT CHANGE UP (ref 0–0.5)
EOSINOPHIL NFR BLD AUTO: 0.7 % — SIGNIFICANT CHANGE UP (ref 0–6)
FLUAV AG NPH QL: SIGNIFICANT CHANGE UP
FLUBV AG NPH QL: SIGNIFICANT CHANGE UP
GAS PNL BLDV: SIGNIFICANT CHANGE UP
GAS PNL BLDV: SIGNIFICANT CHANGE UP
GLUCOSE SERPL-MCNC: 103 MG/DL — HIGH (ref 70–99)
GLUCOSE SERPL-MCNC: 106 MG/DL — HIGH (ref 70–99)
GLUCOSE UR QL: NEGATIVE MG/DL — SIGNIFICANT CHANGE UP
GLUCOSE UR QL: NEGATIVE MG/DL — SIGNIFICANT CHANGE UP
HCT VFR BLD CALC: 34.4 % — LOW (ref 39–50)
HGB BLD-MCNC: 12 G/DL — LOW (ref 13–17)
IMM GRANULOCYTES NFR BLD AUTO: 0.7 % — SIGNIFICANT CHANGE UP (ref 0–0.9)
INR BLD: 1.04 RATIO — SIGNIFICANT CHANGE UP (ref 0.85–1.18)
KETONES UR-MCNC: NEGATIVE MG/DL — SIGNIFICANT CHANGE UP
KETONES UR-MCNC: NEGATIVE MG/DL — SIGNIFICANT CHANGE UP
LEUKOCYTE ESTERASE UR-ACNC: ABNORMAL
LEUKOCYTE ESTERASE UR-ACNC: NEGATIVE — SIGNIFICANT CHANGE UP
LIDOCAIN IGE QN: 35 U/L — SIGNIFICANT CHANGE UP (ref 7–60)
LYMPHOCYTES # BLD AUTO: 1 K/UL — SIGNIFICANT CHANGE UP (ref 1–3.3)
LYMPHOCYTES # BLD AUTO: 10 % — LOW (ref 13–44)
MAGNESIUM SERPL-MCNC: 2.1 MG/DL — SIGNIFICANT CHANGE UP (ref 1.6–2.6)
MCHC RBC-ENTMCNC: 33.1 PG — SIGNIFICANT CHANGE UP (ref 27–34)
MCHC RBC-ENTMCNC: 34.9 GM/DL — SIGNIFICANT CHANGE UP (ref 32–36)
MCV RBC AUTO: 95 FL — SIGNIFICANT CHANGE UP (ref 80–100)
MONOCYTES # BLD AUTO: 1.08 K/UL — HIGH (ref 0–0.9)
MONOCYTES NFR BLD AUTO: 10.8 % — SIGNIFICANT CHANGE UP (ref 2–14)
NEUTROPHILS # BLD AUTO: 7.72 K/UL — HIGH (ref 1.8–7.4)
NEUTROPHILS NFR BLD AUTO: 77.5 % — HIGH (ref 43–77)
NITRITE UR-MCNC: NEGATIVE — SIGNIFICANT CHANGE UP
NITRITE UR-MCNC: NEGATIVE — SIGNIFICANT CHANGE UP
NRBC # BLD: 0 /100 WBCS — SIGNIFICANT CHANGE UP (ref 0–0)
NT-PROBNP SERPL-SCNC: 1271 PG/ML — HIGH (ref 0–300)
OSMOLALITY UR: 408 MOS/KG — SIGNIFICANT CHANGE UP (ref 300–900)
PH UR: 6 — SIGNIFICANT CHANGE UP (ref 5–8)
PH UR: 6.5 — SIGNIFICANT CHANGE UP (ref 5–8)
PLATELET # BLD AUTO: 214 K/UL — SIGNIFICANT CHANGE UP (ref 150–400)
POTASSIUM SERPL-MCNC: 4.3 MMOL/L — SIGNIFICANT CHANGE UP (ref 3.5–5.3)
POTASSIUM SERPL-MCNC: 4.5 MMOL/L — SIGNIFICANT CHANGE UP (ref 3.5–5.3)
POTASSIUM SERPL-SCNC: 4.3 MMOL/L — SIGNIFICANT CHANGE UP (ref 3.5–5.3)
POTASSIUM SERPL-SCNC: 4.5 MMOL/L — SIGNIFICANT CHANGE UP (ref 3.5–5.3)
POTASSIUM UR-SCNC: 24 MMOL/L — SIGNIFICANT CHANGE UP
PROT ?TM UR-MCNC: 7 MG/DL — SIGNIFICANT CHANGE UP (ref 0–12)
PROT SERPL-MCNC: 6.4 G/DL — SIGNIFICANT CHANGE UP (ref 6–8.3)
PROT SERPL-MCNC: 7 G/DL — SIGNIFICANT CHANGE UP (ref 6–8.3)
PROT UR-MCNC: NEGATIVE MG/DL — SIGNIFICANT CHANGE UP
PROT UR-MCNC: NEGATIVE MG/DL — SIGNIFICANT CHANGE UP
PROT/CREAT UR-RTO: 0.1 RATIO — SIGNIFICANT CHANGE UP (ref 0–0.2)
PROTHROM AB SERPL-ACNC: 10.9 SEC — SIGNIFICANT CHANGE UP (ref 9.5–13)
RBC # BLD: 3.62 M/UL — LOW (ref 4.2–5.8)
RBC # FLD: 13.5 % — SIGNIFICANT CHANGE UP (ref 10.3–14.5)
RBC CASTS # UR COMP ASSIST: 0 /HPF — SIGNIFICANT CHANGE UP (ref 0–4)
RSV RNA NPH QL NAA+NON-PROBE: SIGNIFICANT CHANGE UP
SARS-COV-2 RNA SPEC QL NAA+PROBE: SIGNIFICANT CHANGE UP
SODIUM SERPL-SCNC: 124 MMOL/L — LOW (ref 135–145)
SODIUM SERPL-SCNC: 124 MMOL/L — LOW (ref 135–145)
SODIUM UR-SCNC: 22 MMOL/L — SIGNIFICANT CHANGE UP
SP GR SPEC: 1.01 — SIGNIFICANT CHANGE UP (ref 1–1.03)
SP GR SPEC: 1.01 — SIGNIFICANT CHANGE UP (ref 1–1.03)
SQUAMOUS # UR AUTO: 0 /HPF — SIGNIFICANT CHANGE UP (ref 0–5)
TROPONIN T, HIGH SENSITIVITY RESULT: 1001 NG/L — HIGH (ref 0–51)
TROPONIN T, HIGH SENSITIVITY RESULT: 946 NG/L — HIGH (ref 0–51)
UROBILINOGEN FLD QL: 0.2 MG/DL — SIGNIFICANT CHANGE UP (ref 0.2–1)
UROBILINOGEN FLD QL: 0.2 MG/DL — SIGNIFICANT CHANGE UP (ref 0.2–1)
WBC # BLD: 9.97 K/UL — SIGNIFICANT CHANGE UP (ref 3.8–10.5)
WBC # FLD AUTO: 9.97 K/UL — SIGNIFICANT CHANGE UP (ref 3.8–10.5)
WBC UR QL: 2 /HPF — SIGNIFICANT CHANGE UP (ref 0–5)

## 2024-09-02 PROCEDURE — 99285 EMERGENCY DEPT VISIT HI MDM: CPT | Mod: GC

## 2024-09-02 PROCEDURE — 71045 X-RAY EXAM CHEST 1 VIEW: CPT | Mod: 26

## 2024-09-02 PROCEDURE — 93308 TTE F-UP OR LMTD: CPT | Mod: 26

## 2024-09-02 PROCEDURE — 99223 1ST HOSP IP/OBS HIGH 75: CPT

## 2024-09-02 RX ORDER — MAGNESIUM, ALUMINUM HYDROXIDE 200-225/5
30 SUSPENSION, ORAL (FINAL DOSE FORM) ORAL EVERY 4 HOURS
Refills: 0 | Status: DISCONTINUED | OUTPATIENT
Start: 2024-09-02 | End: 2024-09-03

## 2024-09-02 RX ORDER — TAMSULOSIN HYDROCHLORIDE 0.4 MG/1
0.4 CAPSULE ORAL AT BEDTIME
Refills: 0 | Status: DISCONTINUED | OUTPATIENT
Start: 2024-09-02 | End: 2024-09-03

## 2024-09-02 RX ORDER — SODIUM CHLORIDE 9 MG/ML
500 INJECTION INTRAMUSCULAR; INTRAVENOUS; SUBCUTANEOUS ONCE
Refills: 0 | Status: COMPLETED | OUTPATIENT
Start: 2024-09-02 | End: 2024-09-02

## 2024-09-02 RX ORDER — ACETAMINOPHEN 325 MG/1
650 TABLET ORAL EVERY 6 HOURS
Refills: 0 | Status: DISCONTINUED | OUTPATIENT
Start: 2024-09-02 | End: 2024-09-03

## 2024-09-02 RX ORDER — LATANOPROST 50 UG/ML
1 SOLUTION OPHTHALMIC AT BEDTIME
Refills: 0 | Status: DISCONTINUED | OUTPATIENT
Start: 2024-09-02 | End: 2024-09-03

## 2024-09-02 RX ORDER — ASPIRIN 81 MG
243 TABLET, DELAYED RELEASE (ENTERIC COATED) ORAL ONCE
Refills: 0 | Status: COMPLETED | OUTPATIENT
Start: 2024-09-02 | End: 2024-09-02

## 2024-09-02 RX ORDER — FINASTERIDE 1 MG/1
5 TABLET, FILM COATED ORAL DAILY
Refills: 0 | Status: DISCONTINUED | OUTPATIENT
Start: 2024-09-02 | End: 2024-09-03

## 2024-09-02 RX ORDER — ASPIRIN 81 MG
324 TABLET, DELAYED RELEASE (ENTERIC COATED) ORAL ONCE
Refills: 0 | Status: DISCONTINUED | OUTPATIENT
Start: 2024-09-02 | End: 2024-09-02

## 2024-09-02 RX ORDER — ASPIRIN 81 MG
81 TABLET, DELAYED RELEASE (ENTERIC COATED) ORAL DAILY
Refills: 0 | Status: DISCONTINUED | OUTPATIENT
Start: 2024-09-02 | End: 2024-09-03

## 2024-09-02 RX ORDER — ONDANSETRON 2 MG/ML
4 INJECTION, SOLUTION INTRAMUSCULAR; INTRAVENOUS ONCE
Refills: 0 | Status: COMPLETED | OUTPATIENT
Start: 2024-09-02 | End: 2024-09-02

## 2024-09-02 RX ORDER — PANTOPRAZOLE SODIUM 40 MG
40 TABLET, DELAYED RELEASE (ENTERIC COATED) ORAL
Refills: 0 | Status: DISCONTINUED | OUTPATIENT
Start: 2024-09-02 | End: 2024-09-03

## 2024-09-02 RX ORDER — ONDANSETRON 2 MG/ML
4 INJECTION, SOLUTION INTRAMUSCULAR; INTRAVENOUS EVERY 8 HOURS
Refills: 0 | Status: DISCONTINUED | OUTPATIENT
Start: 2024-09-02 | End: 2024-09-03

## 2024-09-02 RX ORDER — SODIUM CHLORIDE 9 MG/ML
1000 INJECTION INTRAMUSCULAR; INTRAVENOUS; SUBCUTANEOUS
Refills: 0 | Status: COMPLETED | OUTPATIENT
Start: 2024-09-02 | End: 2024-09-03

## 2024-09-02 RX ORDER — ISOSORBIDE MONONITRATE 30 MG/1
30 TABLET, EXTENDED RELEASE ORAL DAILY
Refills: 0 | Status: DISCONTINUED | OUTPATIENT
Start: 2024-09-02 | End: 2024-09-03

## 2024-09-02 RX ADMIN — SODIUM CHLORIDE 500 MILLILITER(S): 9 INJECTION INTRAMUSCULAR; INTRAVENOUS; SUBCUTANEOUS at 20:21

## 2024-09-02 RX ADMIN — ONDANSETRON 4 MILLIGRAM(S): 2 INJECTION, SOLUTION INTRAMUSCULAR; INTRAVENOUS at 19:14

## 2024-09-02 RX ADMIN — Medication 243 MILLIGRAM(S): at 20:42

## 2024-09-02 NOTE — H&P ADULT - NSHPLABSRESULTS_GEN_ALL_CORE
12.0   9.97  )-----------( 214      ( 02 Sep 2024 19:17 )             34.4       09-02    124<L>  |  94<L>  |  29<H>  ----------------------------<  103<H>  4.3   |  20<L>  |  1.42<H>    Ca    9.0      02 Sep 2024 22:26  Phos  2.6     09-01  Mg     2.1     09-02    TPro  6.4  /  Alb  3.5  /  TBili  0.4  /  DBili  x   /  AST  52<H>  /  ALT  46<H>  /  AlkPhos  106  09-02              Urinalysis Basic - ( 02 Sep 2024 22:26 )    Color: x / Appearance: x / SG: x / pH: x  Gluc: 103 mg/dL / Ketone: x  / Bili: x / Urobili: x   Blood: x / Protein: x / Nitrite: x   Leuk Esterase: x / RBC: x / WBC x   Sq Epi: x / Non Sq Epi: x / Bacteria: x        PT/INR - ( 02 Sep 2024 20:20 )   PT: 10.9 sec;   INR: 1.04 ratio         PTT - ( 02 Sep 2024 20:20 )  PTT:24.6 sec    CARDIAC MARKERS ( 02 Sep 2024 19:17 )  x     / x     / x     / x     / 5.3 ng/mL        CAPILLARY BLOOD GLUCOSE

## 2024-09-02 NOTE — ED PROVIDER NOTE - CLINICAL SUMMARY MEDICAL DECISION MAKING FREE TEXT BOX
84-year-old male with recent hospital admission for coronary artery disease s/p 2 stent placements which was complicated by reports of chest pain post PCI presenting with light headedness, nausea/vomiting. Denies CP and SOB. Concern for ACS given recent cardiac procedure. Taking DAPT as prescribed. Ddx includes but is not limited to gastritis/gastroenteritis, electrolyte derangement, PNA, paroxysmal cardiac arrhythmia. Is currently being treated with Bactrim for a UTI but no back/flank pain on exam and afebrile. Pending labs/trop, CXR and EKG. 84-year-old male with recent hospital admission for coronary artery disease s/p 2 stent placements which was complicated by reports of chest pain post PCI presenting with light headedness, nausea/vomiting. Denies CP and SOB. Concern for ACS given recent cardiac procedure. Taking DAPT as prescribed. Ddx includes but is not limited to gastritis/gastroenteritis, electrolyte derangement, PNA, pancreatitis, cholecystitis, paroxysmal cardiac arrhythmia. Is currently being treated with Bactrim for a UTI but no back/flank pain on exam and afebrile. Pending labs/trop, CXR and EKG. 84-year-old male with recent hospital admission for coronary artery disease s/p 2 stent placements which was complicated by reports of chest pain post PCI presenting with light headedness, nausea/vomiting. Denies CP and SOB. Concern for ACS given recent cardiac procedure. Taking DAPT as prescribed. Ddx includes but is not limited to ACS, stent thrombosis, gastritis/gastroenteritis, electrolyte derangement, PNA, pancreatitis, cholecystitis, paroxysmal cardiac arrhythmia. Is currently being treated with Bactrim for a UTI but no back/flank pain on exam and afebrile. Pending labs/trop, CXR. EKG showing Sinus bradycardia, unchanged from prior. 84-year-old male with recent hospital admission for coronary artery disease s/p 2 stent placements which was complicated by reports of chest pain post PCI presenting with light headedness, nausea/vomiting. Denies CP and SOB. Concern for ACS given recent cardiac procedure. Taking DAPT as prescribed. Ddx includes but is not limited to ACS, stent thrombosis, gastritis/gastroenteritis, electrolyte derangement, PNA, pancreatitis, cholecystitis, paroxysmal cardiac arrhythmia. Is currently being treated with Bactrim for a UTI but no back/flank pain on exam and afebrile. Pending labs/trop, CXR. EKG showing Sinus bradycardia, unchanged from prior.  Attending Franci Aquino: 85 yo male with multiple medical issues s/p recent admission for CAD s/p 2 stents and diagnosed with UTI on abx presenting with concern for not feeling well. pt reports feeling foggy and epigastric discomfort. upon arrival ekg performed showing no evidence of st elevation or depression. on exam abd soft and nontener. pocus wthout evidence of acute cholecystitis. pt with warm upper ext and no back pain making aortic dissection less likley. no sob or pleuritic pain making pE less likely. blood work obtained showing evidence of elevated troponin. ck- and ckmb added on. unclear whether this is downtredning from recent cardiac procedure. cardiology consulted. pocus performed showing no evidence of pericardial effusion. nonfocal neurologic exam and pt moving all extremities making intracranial hemorrhage less likely. nih of 0 on exam and pt ambulating making acute cva less likely

## 2024-09-02 NOTE — H&P ADULT - NSICDXPASTMEDICALHX_GEN_ALL_CORE_FT
PAST MEDICAL HISTORY:  CAD (coronary artery disease)     GERD (gastroesophageal reflux disease)     Glaucoma     History of BPH     History of osteoarthritis     History of ST elevation myocardial infarction (STEMI)     Mild HTN     PND (post-nasal drip)     Shingles

## 2024-09-02 NOTE — H&P ADULT - PROBLEM SELECTOR PLAN 2
-unable to calculate feNa with current values due to paitnet receiving fluid inbetween serum studies and urine studies.   -likely both hypovolemia and bactrim-induced.   -will give more fluid, encourage oral hydration and f/u rpt lab in the morning.  -hold losartan given laya and soft bp

## 2024-09-02 NOTE — H&P ADULT - PROBLEM SELECTOR PLAN 1
-given urine studies, and physical exam appearing a little dry, likely both hypovolemic and bactrim induced.   -pt completed the course of bactrim and will stop it now.   -will order for 1L NS @ 100cc/hr.   -f/u repeat bmp in the morning

## 2024-09-02 NOTE — H&P ADULT - PROBLEM SELECTOR PLAN 3
-troponin level from last admission was collected prior to PCI. Today, in 1001, trend down to 946 with CKMB 5.3 -> 4.5.   -EKG stable.   -will c/w -troponin level from last admission was collected prior to PCI. Today, in 1001, trend down to 946 with CKMB 5.3 -> 4.5.   -EKG stable.   -will c/w dapt, statin. holding losartan d/t soft bp and laya. Recheck bp in the morning and re-start losartan and other bp meds as appropriate.

## 2024-09-02 NOTE — CONSULT NOTE ADULT - ASSESSMENT
85 y/o M with PMHx of HTN, GERD, BPH and CAD (s/p LAD and LCx stents) who was recently admitted with chest pain found to have an NSTEMI underwent staged PCI to LAD and LCx who is now presenting to the ER abdominal pain and fatigue found to have elevated troponin EDITH, anemia and hyponatremia. Cardiology is being consulted for elevated troponins in the setting of a recent LHC.     EK/27: Sinus bradycardia - > : Sinus bradycardia w/ peaked twave in V2 - > 2024:   TTE: 24: Mild decrease in LV function with LVEF of 45-50% and basal, mid anterolateral wall, basal, and mid inferolateral wall are abnormal RV function and cavity wnl.   Cath:   >: Prox LAD 40%, Mid LAD 90%, 1st diag 40%, LCx 99%, RCA 40%. PCI to LCx  >: Prox LAD 90% Mid LAD 90%: PCI to mid LAD   >: Repeat when patient had sxs -> LAD stents patent, LCx stents patent   Biomarkers: Troponin 18 ()->242  () - > 1001 ()    Impression:     Recommendations  #NSTEMI ddx does include myocarditis and   - c/w aspirin 81mg qdaily   - c/w clopidogrel 75mg qdaily   - c/w atorvastatin 80mg   - CTM on telemetry   - Strict ins and outs  - Daily standing weights   - EKG PRN for worsening or recurrence of chest pain       Please notify Cardiology team with any concerns or acute issues.     Ramon Johnson MD  Cardiology Fellow  83 y/o M with PMHx of HTN, GERD, BPH and CAD (s/p LAD and LCx stents) who was recently admitted with chest pain found to have an NSTEMI underwent staged PCI to LAD and LCx who is now presenting to the ER abdominal pain and fatigue found to have elevated troponin EDITH, anemia and hyponatremia. Cardiology is being consulted for elevated troponin in the setting of a recent LHC.     Telemetry: Sinus bradycardia   EK/27: Sinus bradycardia - > : Sinus bradycardia w/ peaked twave in V2 - > 2024: Sinus bradycardia unchanged from prior   TTE: 24: Mild decrease in LV function with LVEF of 45-50% and basal, mid anterolateral wall, basal, and mid inferolateral wall are abnormal RV function and cavity wnl.   Cath:   >: Prox LAD 40%, Mid LAD 90%, 1st diag 40%, LCx 99%, RCA 40%. PCI to LCx  >: Prox LAD 90% Mid LAD 90%: PCI to mid LAD   >: Repeat when patient had sxs -> LAD stents patent, LCx stents patent   Biomarkers: Troponin 18 ()->242  () - > 1001 ()    Impression: Patient was recently admitted for NSTEMI requiring staged PCI and had 3 catheterizations with 2 involving PCI. Troponins can continue to increase post PCI especially after instrumentation. The troponin of 1001 is not surprising in this setting and may not decline especially given the EDITH the patient is here with. The patients EKG is sinus bradycardia w/o ST changes and he is chest pain free making stent thrombosis extremely unlikely. Bactrim is known to cause hyponatremia via SIADH and increases in creatinine through impaired secretion in the collecting duct or via AIN. My suspicion is the bactrim causes hyponatremia and an EDITH which resulted in the patients feeling of queasiness. No findings at this time are suggestive of acute cardiac pathology. CKMB declines more rapidly then troponins and can be used in this setting.     Recommendations  #Elevated Troponins    - c/w aspirin 81mg qdaily   - c/w clopidogrel 75mg qdaily   - c/w atorvastatin 80mg   - Check CKMB and repeat troponin with CKMB and additional EKG  - Would check serum and urine osmolality and urine sodium to identify etiology of hyponatremia  - Would hold off on further bactrim and consider alternative tx of UTI per primary medicine team and Urologist outpt   - If hyponatremia continues to worsen despite IVF or w/o clear cause then would consider involving Nephrology team   - CTM on telemetry   - Strict ins and outs  - Daily standing weights   - EKG PRN for worsening or recurrence of chest pain     Please notify Cardiology team with any concerns or acute issues.     Ramon Johnson MD  Cardiology Fellow  85 y/o M with PMHx of HTN, GERD, BPH and CAD (s/p LAD and LCx stents) who was recently admitted with chest pain found to have an NSTEMI underwent staged PCI to LAD and LCx who is now presenting to the ER abdominal pain and fatigue found to have elevated troponin EDITH, anemia and hyponatremia. Cardiology is being consulted for elevated troponin in the setting of a recent LHC.     Telemetry: Sinus bradycardia   EK/27: Sinus bradycardia - > : Sinus bradycardia w/ peaked twave in V2 - > 2024: Sinus bradycardia unchanged from prior   TTE: 24: Mild decrease in LV function with LVEF of 45-50% and basal, mid anterolateral wall, basal, and mid inferolateral wall are abnormal RV function and cavity wnl.   Cath:   >: Prox LAD 40%, Mid LAD 90%, 1st diag 40%, LCx 99%, RCA 40%. PCI to LCx  >: Prox LAD 90% Mid LAD 90%: PCI to mid LAD   >: Repeat when patient had sxs -> LAD stents patent, LCx stents patent   Biomarkers: Troponin 18 ()->242  () - > 1001 ()    Impression: Patient was recently admitted for NSTEMI requiring staged PCI and had 3 catheterizations with 2 involving PCI. Troponins can continue to increase post PCI especially after instrumentation. The troponin of 1001 is not surprising in this setting and may not decline especially given the EDITH the patient is here with. The patients EKG is sinus bradycardia w/o ST changes and he is chest pain free making stent thrombosis extremely unlikely. Bactrim is known to cause hyponatremia via SIADH and increases in creatinine through impaired secretion in the collecting duct or via AIN. My suspicion is the bactrim causes hyponatremia and an EDITH which resulted in the patients feeling of queasiness. No findings at this time are suggestive of acute cardiac pathology. CKMB declines more rapidly then troponins and can be used in this setting.     Recommendations  #Elevated Troponins    - c/w aspirin 81mg qdaily   - c/w clopidogrel 75mg qdaily   - c/w atorvastatin 80mg   - Check CKMB and repeat troponin with CKMB and additional EKG  - Would check serum and urine osmolality and urine sodium to identify etiology of hyponatremia  - Would hold off on further bactrim and consider alternative tx of UTI per primary medicine team and Urologist outpt   - If hyponatremia continues to worsen despite IVF or w/o clear cause then would consider involving Nephrology team   - CTM on telemetry   - Strict ins and outs  - Daily standing weights   - EKG PRN for worsening or recurrence of chest pain     Please notify Cardiology team with any concerns or acute issues.     Ramon Johnson MD  Cardiology Fellow     This patient was seen and examined personally by me and the plan was discussed with the fellow and/or resident above. Amendments were made as necessary to the above. Agree with the excellent note and plan above. 84M w HTN, GERD, BPH, CAD recent NSTEMI s/p staged PCI. Now with troponin elevation with abdominal pain. + EDITH, anemia, hyponatremia. ECG SB unchanged. Trend trop, close monitor for CP, holding bactrim. Cont asa, statin, plavix as above.     Nazario Adam MD, MPhil, [facility]  Cardiologist, [facility]  ; [facility] [employer] and [facility]  Email: [email or web address] on-service contact/call information, go to [email or web address] and use [email or web address]" to login.  Outpatient Cardiology appointments, call [phone] to arrange with a colleague; I do not have outpatient Cardiology clinic.

## 2024-09-02 NOTE — ED ADULT NURSE NOTE - OBJECTIVE STATEMENT
Pt is 85 y/o male presenting to the ED via EMS c/o dizziness. As per pt, he was recently d/c'ed from hospital s/p x2 stents. Pt reports since Friday, he has not been feeling well. As per pt, she has intermittent dizziness w/ stomach discomfort. Pt reports that he does not feel like himself and not @ his baseline since second stent placement. Upon assessment, pt AxOx3, sitting up in stretcher and speaking in full sentences. Breathing spontaneously and unlabored, >95% RA. Abdomen soft and nontender to palpation. EKG done, given to MD, pt placed on continuous cardiac monitor, sinus erin. Pt moves all extremities w/ = strength. Skin is warm, dry, and intact w/ + peripheral pulses. Pt denies SOB, chest pain, n/v/d, vision changes, chills, and fever. Safety and comfort measures provided- bed in lowest position, locked, and blanket given.

## 2024-09-02 NOTE — ED PROVIDER NOTE - PROGRESS NOTE DETAILS
Attending Franci Aquino: pt found to have elevated troponin. pt denies any chest pain, but does report epigastric discomfort. pocus without evidence of biliary disease. pt denies any chest or back pain Cardiology consulted- Check CKMB and repeat troponin and EKG. check serum and urine osmolality and urine sodium to identify etiology of hyponatremia- likely ISO bactrim use. Recommended d/c bactrim and switch to alternate abx on d/c. 1L IVF to be given. Cardiology consulted- Check CKMB and repeat troponin and EKG. check serum and urine osmolality and urine sodium to identify etiology of hyponatremia- likely ISO bactrim use. Recommended d/c bactrim and switch to alternate abx on d/c. 1/2L IVF to be given. Attending Franci Aquino: pt feeling better. no abdominal ttp on repeat exam. nonfocal neurologic exam. will admit to medicine for further evaluation

## 2024-09-02 NOTE — ED ADULT NURSE NOTE - EXTENSIONS OF SELF_ADULT
Lab calling for pre-procedure Covid-19 test.  Had called Friday but no order in yet, pt comes in at 1130 today.    MD paged.    Kalie Guy MD called back and RN updated to lab order needed for pre-procedure, scheduled on 4/13/22.  Per MD order pre-procedure Covid-19 test for today.  Repeated back and confirmed.      Order placed, lab updated.      Reason for Disposition  • [1] Follow-up call from patient regarding patient's clinical status AND [2] information urgent     Need lab order.    Protocols used: PCP CALL - NO TRIAGE-A-       Eyeglasses

## 2024-09-02 NOTE — H&P ADULT - HISTORY OF PRESENT ILLNESS
84M PMHx HTN, GERD, BPH, and CAD s/p PCI most recently 8/29-30/24, and dc’d on 9/1. During this hospitalization, pt was also started on bactrim (8/27).    Pt returns to hospital today for feeling “woozy” and nausea. Denies fever, chlils, ha, sob, n/v/d.   In the ED, HR in 50s, BP stable, on RA.    CBC w/ wbc 9.9, hgb 12.0, plt 214.   Coag wnl.    CMP w/ Na 124 (was 131 on 9/1), K 92, BUN 33, SCr 1.54 (was 1.20 on 9/1), AST 60, ALT 44. Trop 1001 (was 242 on 8/28 before PCI), , CKMB 5.3, proBNP 1271.    ED POCUS echo showed no pericardial effusion.    Eval by cards attending in the ED. Recommends c/w dapt, atorva 80, check CKMB and rpt trop w/ CKMB and EKG, discontinue bactrim, and w/u for hypoNa.    Pt received 500cc NS after the first chemistry, and repeat CMP showed Na 124, Chl 94, SCr 1.42, AST 52, ALT 46.

## 2024-09-02 NOTE — ED ADULT TRIAGE NOTE - CHIEF COMPLAINT QUOTE
1 stent placed on thursday, 1 stent placed on friday. since then experiencing dizziness, nausea/ upset stomach and blurry vision. denies cp

## 2024-09-02 NOTE — ED PROVIDER NOTE - PHYSICAL EXAMINATION
Attending Franci Aquino: Gen: NAD, heent: atrauamtic, eomi, perrla, mmm, op pink, uvula midline, neck; nttp, no nuchal rigidity, chest: nttp, no crepitus, cv: rrr, no murmurs, lungs: ctab, abd: soft, nontender, nondistended, no peritoneal signs, no RUQ ttp no guarding, ext: wwp, ecchymoes to right groin, no thrill, ecchymose to right wrist, skin: no rash, neuro: awake and alert, following commands, speech clear, sensation and strength intact, no focal deficits no protonatr drift

## 2024-09-02 NOTE — H&P ADULT - NSHPPHYSICALEXAM_GEN_ALL_CORE
Vital Signs Last 24 Hrs  T(C): 36.6 (02 Sep 2024 22:56), Max: 36.7 (02 Sep 2024 18:04)  T(F): 97.9 (02 Sep 2024 22:56), Max: 98.1 (02 Sep 2024 18:04)  HR: 51 (02 Sep 2024 22:56) (51 - 55)  BP: 111/53 (02 Sep 2024 22:56) (111/53 - 118/50)  BP(mean): 70 (02 Sep 2024 22:56) (70 - 70)  RR: 16 (02 Sep 2024 22:56) (16 - 16)  SpO2: 97% (02 Sep 2024 22:56) (97% - 100%)    Parameters below as of 02 Sep 2024 22:56  Patient On (Oxygen Delivery Method): room air        CONSTITUTIONAL: Well-groomed, in no apparent distress  EYES: No conjunctival or scleral injection, non-icteric  ENMT: No external nasal lesions; MMM  RESPIRATORY: Breathing comfortably; lungs CTA without wheeze/rhonchi/rales  CARDIOVASCULAR: +S1S2, RRR; no lower extremity edema  GASTROINTESTINAL: No tenderness, +BS throughout, no rebound/guarding  NEUROLOGIC: No gross focal neurological deficits, AAOX3  PSYCHIATRIC: mood and affect appropriate; appropriate insight and judgment

## 2024-09-02 NOTE — ED ADULT NURSE NOTE - BEFAST BALANCE
Called Mr. Ryder this afternoon to discuss upcoming surgery and tumor board recommendations that we proceed with surgery. We addressed all of his questions and rediscussed all benefits and risks of the surgery. 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 We discussed the potential that there is no remaining cancer and no lymph node spread, we discussed the possibility of lymph node involvement and need for chemotherapy, we discussed the exceptionally low risk of need an ostomy and that this would not be considered primarily (only for an anastomotic complication). Other issues were discussed, including bleeding and infection - we have re-reviewed prior cardiac workup. All questions addressed - I will call him Friday to touch base.     Stress test - 2/28/2022    Normal myocardial perfusion scan. There is no evidence of myocardial ischemia or infarction.    There is a  mild intensity fixed perfusion abnormality in the inferior wall of the left ventricle secondary to diaphragm attenuation.    The gated perfusion images showed an ejection fraction of 50% at rest.    The EKG portion of this study is negative for ischemia.    The patient reported no chest pain during the stress test.    There were no arrhythmias during stress.      Erik Stout MD - Staff Surgeon  Department of Colon & Rectal Surgery  [facility]     No

## 2024-09-02 NOTE — ED PROVIDER NOTE - OBJECTIVE STATEMENT
Attending Franci Aquino: 84-year-old male with multiple medical issues including recent hospital admission for coronary artery disease with 2 stent placements which was complicated by reports of chest pain post PCI presenting with concern for feeling woozy as well as having intermittent abdominal pain.  Patient states that since the second stent was placed has not been feeling well.  States has been feeling woozy and having intermittent sour stomach.  Patient states he just does not feel like himself.  No headaches or numbness or any tingling.  No black or bloody stools.  Patient states he tried to have a bowel movement today and afterwards felt worse.  Denies any chest pain or any shortness of breath.  Has been taking his medications.

## 2024-09-02 NOTE — ED PROVIDER NOTE - ATTENDING CONTRIBUTION TO CARE
Attending MD Franci Aquino:  I personally have seen and examined this patient.  Resident note reviewed and agree on plan of care and except where noted.  See HPI, PE, and MDM for details.

## 2024-09-03 ENCOUNTER — TRANSCRIPTION ENCOUNTER (OUTPATIENT)
Age: 84
End: 2024-09-03

## 2024-09-03 VITALS
RESPIRATION RATE: 18 BRPM | DIASTOLIC BLOOD PRESSURE: 62 MMHG | OXYGEN SATURATION: 97 % | HEART RATE: 51 BPM | TEMPERATURE: 98 F | SYSTOLIC BLOOD PRESSURE: 143 MMHG

## 2024-09-03 DIAGNOSIS — R79.89 OTHER SPECIFIED ABNORMAL FINDINGS OF BLOOD CHEMISTRY: ICD-10-CM

## 2024-09-03 PROBLEM — H40.9 UNSPECIFIED GLAUCOMA: Chronic | Status: ACTIVE | Noted: 2024-08-27

## 2024-09-03 LAB
ANION GAP SERPL CALC-SCNC: 9 MMOL/L — SIGNIFICANT CHANGE UP (ref 5–17)
BUN SERPL-MCNC: 23 MG/DL — SIGNIFICANT CHANGE UP (ref 7–23)
CALCIUM SERPL-MCNC: 8.9 MG/DL — SIGNIFICANT CHANGE UP (ref 8.4–10.5)
CHLORIDE SERPL-SCNC: 100 MMOL/L — SIGNIFICANT CHANGE UP (ref 96–108)
CK MB CFR SERPL CALC: 3.8 NG/ML — SIGNIFICANT CHANGE UP (ref 0–6.7)
CO2 SERPL-SCNC: 22 MMOL/L — SIGNIFICANT CHANGE UP (ref 22–31)
CREAT SERPL-MCNC: 1.23 MG/DL — SIGNIFICANT CHANGE UP (ref 0.5–1.3)
EGFR: 58 ML/MIN/1.73M2 — LOW
GLUCOSE SERPL-MCNC: 91 MG/DL — SIGNIFICANT CHANGE UP (ref 70–99)
HCT VFR BLD CALC: 32.3 % — LOW (ref 39–50)
HGB BLD-MCNC: 10.8 G/DL — LOW (ref 13–17)
MAGNESIUM SERPL-MCNC: 2.1 MG/DL — SIGNIFICANT CHANGE UP (ref 1.6–2.6)
MCHC RBC-ENTMCNC: 31.9 PG — SIGNIFICANT CHANGE UP (ref 27–34)
MCHC RBC-ENTMCNC: 33.4 GM/DL — SIGNIFICANT CHANGE UP (ref 32–36)
MCV RBC AUTO: 95.3 FL — SIGNIFICANT CHANGE UP (ref 80–100)
NRBC # BLD: 0 /100 WBCS — SIGNIFICANT CHANGE UP (ref 0–0)
PHOSPHATE SERPL-MCNC: 2.2 MG/DL — LOW (ref 2.5–4.5)
PLATELET # BLD AUTO: 188 K/UL — SIGNIFICANT CHANGE UP (ref 150–400)
POTASSIUM SERPL-MCNC: 4.7 MMOL/L — SIGNIFICANT CHANGE UP (ref 3.5–5.3)
POTASSIUM SERPL-SCNC: 4.7 MMOL/L — SIGNIFICANT CHANGE UP (ref 3.5–5.3)
RBC # BLD: 3.39 M/UL — LOW (ref 4.2–5.8)
RBC # FLD: 13.6 % — SIGNIFICANT CHANGE UP (ref 10.3–14.5)
SODIUM SERPL-SCNC: 131 MMOL/L — LOW (ref 135–145)
TROPONIN T, HIGH SENSITIVITY RESULT: 1002 NG/L — HIGH (ref 0–51)
TROPONIN T, HIGH SENSITIVITY RESULT: 917 NG/L — HIGH (ref 0–51)
UUN UR-MCNC: 788 MG/DL — SIGNIFICANT CHANGE UP
WBC # BLD: 6.97 K/UL — SIGNIFICANT CHANGE UP (ref 3.8–10.5)
WBC # FLD AUTO: 6.97 K/UL — SIGNIFICANT CHANGE UP (ref 3.8–10.5)

## 2024-09-03 PROCEDURE — 82803 BLOOD GASES ANY COMBINATION: CPT

## 2024-09-03 PROCEDURE — 99239 HOSP IP/OBS DSCHRG MGMT >30: CPT

## 2024-09-03 PROCEDURE — 85018 HEMOGLOBIN: CPT

## 2024-09-03 PROCEDURE — 36415 COLL VENOUS BLD VENIPUNCTURE: CPT

## 2024-09-03 PROCEDURE — 99221 1ST HOSP IP/OBS SF/LOW 40: CPT

## 2024-09-03 PROCEDURE — 84100 ASSAY OF PHOSPHORUS: CPT

## 2024-09-03 PROCEDURE — 84132 ASSAY OF SERUM POTASSIUM: CPT

## 2024-09-03 PROCEDURE — 85025 COMPLETE CBC W/AUTO DIFF WBC: CPT

## 2024-09-03 PROCEDURE — 82570 ASSAY OF URINE CREATININE: CPT

## 2024-09-03 PROCEDURE — 85014 HEMATOCRIT: CPT

## 2024-09-03 PROCEDURE — 84156 ASSAY OF PROTEIN URINE: CPT

## 2024-09-03 PROCEDURE — 82435 ASSAY OF BLOOD CHLORIDE: CPT

## 2024-09-03 PROCEDURE — 82947 ASSAY GLUCOSE BLOOD QUANT: CPT

## 2024-09-03 PROCEDURE — 85027 COMPLETE CBC AUTOMATED: CPT

## 2024-09-03 PROCEDURE — 84300 ASSAY OF URINE SODIUM: CPT

## 2024-09-03 PROCEDURE — 85610 PROTHROMBIN TIME: CPT

## 2024-09-03 PROCEDURE — 81003 URINALYSIS AUTO W/O SCOPE: CPT

## 2024-09-03 PROCEDURE — 83930 ASSAY OF BLOOD OSMOLALITY: CPT

## 2024-09-03 PROCEDURE — 84540 ASSAY OF URINE/UREA-N: CPT

## 2024-09-03 PROCEDURE — 80048 BASIC METABOLIC PNL TOTAL CA: CPT

## 2024-09-03 PROCEDURE — 82550 ASSAY OF CK (CPK): CPT

## 2024-09-03 PROCEDURE — 71045 X-RAY EXAM CHEST 1 VIEW: CPT

## 2024-09-03 PROCEDURE — 84133 ASSAY OF URINE POTASSIUM: CPT

## 2024-09-03 PROCEDURE — 84295 ASSAY OF SERUM SODIUM: CPT

## 2024-09-03 PROCEDURE — 84484 ASSAY OF TROPONIN QUANT: CPT

## 2024-09-03 PROCEDURE — 82330 ASSAY OF CALCIUM: CPT

## 2024-09-03 PROCEDURE — 81001 URINALYSIS AUTO W/SCOPE: CPT

## 2024-09-03 PROCEDURE — 85730 THROMBOPLASTIN TIME PARTIAL: CPT

## 2024-09-03 PROCEDURE — 93308 TTE F-UP OR LMTD: CPT

## 2024-09-03 PROCEDURE — 83605 ASSAY OF LACTIC ACID: CPT

## 2024-09-03 PROCEDURE — 87637 SARSCOV2&INF A&B&RSV AMP PRB: CPT

## 2024-09-03 PROCEDURE — 99285 EMERGENCY DEPT VISIT HI MDM: CPT

## 2024-09-03 PROCEDURE — 83935 ASSAY OF URINE OSMOLALITY: CPT

## 2024-09-03 PROCEDURE — 87086 URINE CULTURE/COLONY COUNT: CPT

## 2024-09-03 PROCEDURE — 83690 ASSAY OF LIPASE: CPT

## 2024-09-03 PROCEDURE — 82553 CREATINE MB FRACTION: CPT

## 2024-09-03 PROCEDURE — 80053 COMPREHEN METABOLIC PANEL: CPT

## 2024-09-03 PROCEDURE — 83735 ASSAY OF MAGNESIUM: CPT

## 2024-09-03 PROCEDURE — 83880 ASSAY OF NATRIURETIC PEPTIDE: CPT

## 2024-09-03 RX ORDER — ACETAMINOPHEN 325 MG/1
2 TABLET ORAL
Qty: 0 | Refills: 0 | DISCHARGE
Start: 2024-09-03

## 2024-09-03 RX ORDER — ONDANSETRON 2 MG/ML
1 INJECTION, SOLUTION INTRAMUSCULAR; INTRAVENOUS
Qty: 21 | Refills: 0
Start: 2024-09-03 | End: 2024-09-09

## 2024-09-03 RX ADMIN — Medication 40 MILLIGRAM(S): at 04:53

## 2024-09-03 RX ADMIN — Medication 75 MILLIGRAM(S): at 00:57

## 2024-09-03 RX ADMIN — SODIUM CHLORIDE 100 MILLILITER(S): 9 INJECTION INTRAMUSCULAR; INTRAVENOUS; SUBCUTANEOUS at 00:57

## 2024-09-03 RX ADMIN — FINASTERIDE 5 MILLIGRAM(S): 1 TABLET, FILM COATED ORAL at 11:37

## 2024-09-03 RX ADMIN — Medication 81 MILLIGRAM(S): at 11:37

## 2024-09-03 RX ADMIN — Medication 75 MILLIGRAM(S): at 11:38

## 2024-09-03 NOTE — DISCHARGE NOTE PROVIDER - NSDCMRMEDTOKEN_GEN_ALL_CORE_FT
acetaminophen 325 mg oral tablet: 2 tab(s) orally every 6 hours As needed Temp greater or equal to 38C (100.4F), Mild Pain (1 - 3)  amLODIPine 2.5 mg oral tablet: 3 tab(s) orally once a day  aspirin 81 mg oral delayed release tablet: 1 tab(s) orally once a day  atorvastatin 40 mg oral tablet: 1 tab(s) orally once a day (at bedtime)  Cardiac Rehab: Three times a week for 12 weeks post PCI  clopidogrel 75 mg oral tablet: 1 tab(s) orally once a day  finasteride 5 mg oral tablet: 1 tab(s) orally once a day  Flonase 50 mcg/inh nasal spray: 12 spray(s) in each nostril once a day  isosorbide mononitrate 30 mg oral tablet, extended release: 1 tab(s) orally once a day  losartan 25 mg oral tablet: 1 tab(s) orally once a day  Lumigan 0.01% ophthalmic solution: 1 drop(s) in each affected eye once a day (at bedtime)  omeprazole 20 mg oral delayed release capsule: 1 cap(s) orally once a day  ondansetron 4 mg oral tablet, disintegratin tab(s) orally every 8 hours as needed for  nausea  Rhopressa 0.02% ophthalmic solution: 1 drop(s) in each eye once a day (at bedtime)  tamsulosin 0.4 mg oral capsule: 1 cap(s) orally once a day

## 2024-09-03 NOTE — DISCHARGE NOTE PROVIDER - NSDCFUADDAPPT_GEN_ALL_CORE_FT
APPTS ARE READY TO BE MADE: [x ] YES    Best Family or Patient Contact (if needed):    Additional Information about above appointments (if needed):    1:   2:   3:     Other comments or requests:    APPTS ARE READY TO BE MADE: [x ] YES    Best Family or Patient Contact (if needed):    Additional Information about above appointments (if needed):    1:   2:   3:     Other comments or requests:     Internal Medicine:  Prior to outreaching the patient, it was visible that the patient has secured a follow up appointment which was not scheduled by our team  Dr. Johnson 9/11/2024 12:40 PM [id number]    Cardiology/Dr. Gomez  Provided patient with provider referral information, however patient prefers to schedule the appointments on their own.   Pt will schedule after he sees internal medicine Dr. Johnson - They are are both at the same practic

## 2024-09-03 NOTE — DISCHARGE NOTE PROVIDER - CARE PROVIDERS DIRECT ADDRESSES
[email or web address]

## 2024-09-03 NOTE — DISCHARGE NOTE NURSING/CASE MANAGEMENT/SOCIAL WORK - PATIENT PORTAL LINK FT
You can access the FollowMyHealth Patient Portal offered by [facility] by registering at the following website: [email or web address]. By joining [facility]’s FollowMyHealth portal, you will also be able to view your health information using other applications (apps) compatible with our system.

## 2024-09-03 NOTE — DISCHARGE NOTE PROVIDER - CARE PROVIDER_API CALL
Jaime Barrett  Internal Medicine  [address], [address]  [place] [address]  Phone: [phone]  Fax: [phone]  Established Patient  Follow Up Time: 1-3 days    Roberto Gomez  Cardiology  [address], [address]  [place] [address]  Phone: [phone]  Fax: [phone]  Follow Up Time: 1 week

## 2024-09-03 NOTE — DISCHARGE NOTE PROVIDER - NSDCFUSCHEDAPPT_GEN_ALL_CORE_FT
Nakul Johnson  [facility]  INT[facility] [address]  Scheduled Appointment: 09/11/2024

## 2024-09-03 NOTE — DISCHARGE NOTE PROVIDER - PROVIDER TOKENS
PROVIDER:[TOKEN:[[id number]],FOLLOWUP:[1-3 days],ESTABLISHEDPATIENT:[T]],PROVIDER:[TOKEN:[[id number]],FOLLOWUP:[1 week]]

## 2024-09-03 NOTE — DISCHARGE NOTE PROVIDER - ATTENDING DISCHARGE PHYSICAL EXAMINATION:
Seen and examined at bedside. NAD.  Vital Signs Last 24 Hrs  T(C): 36.7 (03 Sep 2024 12:34), Max: 36.7 (02 Sep 2024 18:04)  T(F): 98 (03 Sep 2024 12:34), Max: 98.1 (02 Sep 2024 18:04)  HR: 51 (03 Sep 2024 12:34) (48 - 55)  BP: 143/62 (03 Sep 2024 12:34) (111/53 - 143/62)  BP(mean): 70 (02 Sep 2024 22:56) (70 - 70)  RR: 18 (03 Sep 2024 12:34) (16 - 20)  SpO2: 97% (03 Sep 2024 12:34) (95% - 100%)    Parameters below as of 03 Sep 2024 12:34  Patient On (Oxygen Delivery Method): room air        CONSTITUTIONAL: NAD, well-developed, well-groomed  EYES: PERRLA; conjunctiva and sclera clear  ENMT: Moist oral mucosa, no pharyngeal injection or exudates;   NECK: Supple, no palpable masses;   RESPIRATORY: Normal respiratory effort; lungs are clear to auscultation bilaterally  CARDIOVASCULAR: Regular rate and rhythm, normal S1 and S2, no murmur/rub/gallop; No lower extremity edema;   ABDOMEN: Nontender to palpation, normoactive bowel sounds, no rebound/guarding;  MUSCULOSKELETAL:  no clubbing or cyanosis of digits; no joint swelling or tenderness to palpation  PSYCH: A+O to person, place, and time; affect appropriate  NEUROLOGY: CN 2-12 are intact and symmetric; no gross sensory deficits   SKIN: No rashes; no palpable lesions

## 2024-09-03 NOTE — DISCHARGE NOTE PROVIDER - NSDCCPCAREPLAN_GEN_ALL_CORE_FT
PRINCIPAL DISCHARGE DIAGNOSIS  Diagnosis: Hyponatremia  Assessment and Plan of Treatment: You recieved 1 liter of IV fluids and bactrim is to be discontinued. Sodium levels improved. Please follow-up with your primary care physician within one week of discharge.      SECONDARY DISCHARGE DIAGNOSES  Diagnosis: EDITH (acute kidney injury)  Assessment and Plan of Treatment: Likely both hypovolemia and bactrim-induced. IV fluids improved your creatinine. Oral hydration is encouraged. Yopur losartan was held while in the hospital as it can contribute to EDITH; you may now resume this medication.    Diagnosis: Elevated troponin  Assessment and Plan of Treatment: Elevated cardiac enzymes likely in the setting of recent PCI. You were cleared by cardiology Dr Adam for no acute cardiac events. Please follow-up with your cardiologist outpatient.    Diagnosis: Nausea  Assessment and Plan of Treatment: Please take zofran every 8 hours as needed for nausea

## 2024-09-03 NOTE — DISCHARGE NOTE PROVIDER - HOSPITAL COURSE
HPI:  84M PMHx HTN, GERD, BPH, and CAD s/p PCI most recently 8/29-30/24, and dc’d on 9/1. During this hospitalization, pt was also started on bactrim (8/27).    Pt returns to hospital today for feeling “woozy” and nausea. Denies fever, chlils, ha, sob, n/v/d.   In the ED, HR in 50s, BP stable, on RA.    CBC w/ wbc 9.9, hgb 12.0, plt 214.   Coag wnl.    CMP w/ Na 124 (was 131 on 9/1), K 92, BUN 33, SCr 1.54 (was 1.20 on 9/1), AST 60, ALT 44. Trop 1001 (was 242 on 8/28 before PCI), , CKMB 5.3, proBNP 1271.    ED POCUS echo showed no pericardial effusion.    Eval by cards attending in the ED. Recommends c/w dapt, atorva 80, check CKMB and rpt trop w/ CKMB and EKG, discontinue bactrim, and w/u for hypoNa.    Pt received 500cc NS after the first chemistry, and repeat CMP showed Na 124, Chl 94, SCr 1.42, AST 52, ALT 46.   (02 Sep 2024 22:58)    Hospital Course:  Trop 946 > 917>1002, CKMB downtrending. Cleared by cardiology Dr. Adam.   Na 124 > 131 s/p 1L NS IVF  Losartan held for EDITH. Cr improved on AM labs, may resume.   Zofran improving nausea    Discharge planning discussed with attending Dr Jimenes. Patient is medically cleared and stable for discharge home. Medication Reconciliation reviewed with attending. Follow up outpatient with your PCP.    Important Medication Changes and Reason:  Stop Bactrim  Resume losartan  Start zofran q8 hrs prn nausea    Active or Pending Issues Requiring Follow-up:  PCP, cardiology    Advanced Directives:   [ x] Full code  [ ] DNR  [ ] Hospice    Discharge Diagnoses:  Hyponatremia  EDITH  Elevated troponin

## 2024-09-04 ENCOUNTER — APPOINTMENT (OUTPATIENT)
Dept: ORTHOPEDIC SURGERY | Facility: CLINIC | Age: 84
End: 2024-09-04

## 2024-09-04 PROBLEM — I25.10 ATHEROSCLEROTIC HEART DISEASE OF NATIVE CORONARY ARTERY WITHOUT ANGINA PECTORIS: Chronic | Status: ACTIVE | Noted: 2024-09-02

## 2024-09-04 PROBLEM — I25.2 OLD MYOCARDIAL INFARCTION: Chronic | Status: ACTIVE | Noted: 2024-09-02

## 2024-09-04 LAB
CULTURE RESULTS: NO GROWTH — SIGNIFICANT CHANGE UP
SPECIMEN SOURCE: SIGNIFICANT CHANGE UP

## 2024-09-06 ENCOUNTER — TRANSCRIPTION ENCOUNTER (OUTPATIENT)
Age: 84
End: 2024-09-06

## 2024-09-10 ENCOUNTER — APPOINTMENT (OUTPATIENT)
Dept: CARDIOLOGY | Facility: CLINIC | Age: 84
End: 2024-09-10

## 2024-09-10 ENCOUNTER — APPOINTMENT (OUTPATIENT)
Dept: INTERNAL MEDICINE | Facility: CLINIC | Age: 84
End: 2024-09-10
Payer: MEDICARE

## 2024-09-10 VITALS
HEIGHT: 67 IN | OXYGEN SATURATION: 99 % | WEIGHT: 144 LBS | BODY MASS INDEX: 22.6 KG/M2 | HEART RATE: 50 BPM | SYSTOLIC BLOOD PRESSURE: 149 MMHG | DIASTOLIC BLOOD PRESSURE: 70 MMHG

## 2024-09-10 DIAGNOSIS — I25.10 ATHEROSCLEROTIC HEART DISEASE OF NATIVE CORONARY ARTERY W/OUT ANGINA PECTORIS: ICD-10-CM

## 2024-09-10 LAB
APPEARANCE: CLEAR
BACTERIA: NEGATIVE /HPF
BILIRUBIN URINE: NEGATIVE
BLOOD URINE: NEGATIVE
CAST: 0 /LPF
COLOR: YELLOW
EPITHELIAL CELLS: 0 /HPF
GLUCOSE QUALITATIVE U: NEGATIVE MG/DL
KETONES URINE: NEGATIVE MG/DL
LEUKOCYTE ESTERASE URINE: ABNORMAL
MICROSCOPIC-UA: NORMAL
NITRITE URINE: NEGATIVE
PH URINE: 7
PROTEIN URINE: NEGATIVE MG/DL
RED BLOOD CELLS URINE: 0 /HPF
SPECIFIC GRAVITY URINE: 1.01
UROBILINOGEN URINE: 0.2 MG/DL
WHITE BLOOD CELLS URINE: 2 /HPF

## 2024-09-10 PROCEDURE — 99215 OFFICE O/P EST HI 40 MIN: CPT

## 2024-09-10 PROCEDURE — G2211 COMPLEX E/M VISIT ADD ON: CPT

## 2024-09-11 ENCOUNTER — APPOINTMENT (OUTPATIENT)
Dept: INTERNAL MEDICINE | Facility: CLINIC | Age: 84
End: 2024-09-11
Payer: MEDICARE

## 2024-09-11 ENCOUNTER — NON-APPOINTMENT (OUTPATIENT)
Age: 84
End: 2024-09-11

## 2024-09-11 ENCOUNTER — APPOINTMENT (OUTPATIENT)
Dept: CARDIOLOGY | Facility: CLINIC | Age: 84
End: 2024-09-11
Payer: MEDICARE

## 2024-09-11 ENCOUNTER — TRANSCRIPTION ENCOUNTER (OUTPATIENT)
Age: 84
End: 2024-09-11

## 2024-09-11 VITALS
BODY MASS INDEX: 23.09 KG/M2 | DIASTOLIC BLOOD PRESSURE: 66 MMHG | WEIGHT: 147.13 LBS | HEART RATE: 48 BPM | OXYGEN SATURATION: 97 % | HEIGHT: 67 IN | TEMPERATURE: 97.6 F | SYSTOLIC BLOOD PRESSURE: 132 MMHG

## 2024-09-11 VITALS — SYSTOLIC BLOOD PRESSURE: 124 MMHG | DIASTOLIC BLOOD PRESSURE: 70 MMHG

## 2024-09-11 DIAGNOSIS — R07.89 OTHER CHEST PAIN: ICD-10-CM

## 2024-09-11 DIAGNOSIS — E87.1 HYPO-OSMOLALITY AND HYPONATREMIA: ICD-10-CM

## 2024-09-11 DIAGNOSIS — I10 ESSENTIAL (PRIMARY) HYPERTENSION: ICD-10-CM

## 2024-09-11 PROBLEM — I50.20 HFREF (HEART FAILURE WITH REDUCED EJECTION FRACTION): Status: ACTIVE | Noted: 2024-09-11

## 2024-09-11 PROBLEM — M79.89 LEG SWELLING: Status: ACTIVE | Noted: 2024-09-11

## 2024-09-11 PROBLEM — R06.02 SOB (SHORTNESS OF BREATH) ON EXERTION: Status: ACTIVE | Noted: 2019-04-11

## 2024-09-11 LAB — BACTERIA UR CULT: NORMAL

## 2024-09-11 PROCEDURE — 93970 EXTREMITY STUDY: CPT

## 2024-09-11 PROCEDURE — 99496 TRANSJ CARE MGMT HIGH F2F 7D: CPT

## 2024-09-11 PROCEDURE — G2211 COMPLEX E/M VISIT ADD ON: CPT | Mod: NC

## 2024-09-11 PROCEDURE — 93000 ELECTROCARDIOGRAM COMPLETE: CPT

## 2024-09-11 PROCEDURE — 93306 TTE W/DOPPLER COMPLETE: CPT

## 2024-09-11 RX ORDER — ONDANSETRON 4 MG/1
4 TABLET, ORALLY DISINTEGRATING ORAL EVERY 8 HOURS
Refills: 0 | Status: ACTIVE | COMMUNITY
Start: 2024-09-11

## 2024-09-11 RX ORDER — NETARSUDIL 0.2 MG/ML
0.02 SOLUTION/ DROPS OPHTHALMIC; TOPICAL DAILY
Refills: 0 | Status: ACTIVE | COMMUNITY
Start: 2024-09-11

## 2024-09-11 NOTE — HISTORY OF PRESENT ILLNESS
[de-identified] : The patient had been admitted to [facility] with chest pain last week.  He underwent 3 angiograms.  After the second angiogram he developed significant substernal chest pain which resulted in the 3rd coronary angiogram through the right groin.  The stents were patent.. The patient currently feels that his exercise tolerance is diminished compared to the way it was prior to the cardiac event.

## 2024-09-11 NOTE — PHYSICAL EXAM
[No Acute Distress] : no acute distress [Well Nourished] : well nourished [No Respiratory Distress] : no respiratory distress  [No Accessory Muscle Use] : no accessory muscle use [Normal Rate] : normal rate  [Regular Rhythm] : with a regular rhythm [Soft] : abdomen soft [Non Tender] : non-tender [No HSM] : no HSM

## 2024-09-11 NOTE — HISTORY OF PRESENT ILLNESS
[de-identified] : The patient had been admitted to [facility] with chest pain last week.  He underwent 3 angiograms.  After the second angiogram he developed significant substernal chest pain which resulted in the 3rd coronary angiogram through the right groin.  The stents were patent.. The patient currently feels that his exercise tolerance is diminished compared to the way it was prior to the cardiac event.

## 2024-09-11 NOTE — REVIEW OF SYSTEMS
[Fever] : no fever [Chest Pain] : no chest pain [Shortness Of Breath] : no shortness of breath [Vomiting] : no vomiting [Dysuria] : no dysuria

## 2024-09-11 NOTE — ASSESSMENT
[FreeTextEntry1] : I have reviewed in detail the notes from the patient;s hospitalization last week.  He currently has 2 stents. One to the Lcx and the other to the LAD.  Echo report shows some abnormal wall motion abnormality. Patient was told that he should be seen by a Cardiologist.  He is seeing a PCP in that office tomorrow. It is possible that the patient is having symptoms related to his recent cardiac episode.  I also discussed the issue of his frequent UTI's.  Because of his history of severe diverticulitis with surgery last year, I discussed with the patient and his wife about a possible entero-vesicular fistula.  I have spent a total of 55 minutes on the day of the visit both face to face and non-face to face with the patient.

## 2024-09-11 NOTE — HISTORY OF PRESENT ILLNESS
[de-identified] : The patient had been admitted to [facility] with chest pain last week.  He underwent 3 angiograms.  After the second angiogram he developed significant substernal chest pain which resulted in the 3rd coronary angiogram through the right groin.  The stents were patent.. The patient currently feels that his exercise tolerance is diminished compared to the way it was prior to the cardiac event.

## 2024-09-12 ENCOUNTER — APPOINTMENT (OUTPATIENT)
Dept: CARDIOLOGY | Facility: CLINIC | Age: 84
End: 2024-09-12
Payer: MEDICARE

## 2024-09-12 VITALS
SYSTOLIC BLOOD PRESSURE: 122 MMHG | TEMPERATURE: 98 F | WEIGHT: 147 LBS | BODY MASS INDEX: 23.07 KG/M2 | DIASTOLIC BLOOD PRESSURE: 50 MMHG | HEIGHT: 67 IN

## 2024-09-12 VITALS — HEART RATE: 47 BPM | OXYGEN SATURATION: 98 %

## 2024-09-12 DIAGNOSIS — R42 DIZZINESS AND GIDDINESS: ICD-10-CM

## 2024-09-12 DIAGNOSIS — Z13.228 ENCOUNTER FOR SCREENING FOR OTHER METABOLIC DISORDERS: ICD-10-CM

## 2024-09-12 DIAGNOSIS — R00.1 BRADYCARDIA, UNSPECIFIED: ICD-10-CM

## 2024-09-12 PROBLEM — I25.10 CORONARY ARTERY DISEASE: Status: ACTIVE | Noted: 2024-09-11

## 2024-09-12 LAB
ALBUMIN SERPL ELPH-MCNC: 4.2 G/DL
ALP BLD-CCNC: 139 U/L
ALT SERPL-CCNC: 40 U/L
ANION GAP SERPL CALC-SCNC: 13 MMOL/L
APPEARANCE: CLEAR
AST SERPL-CCNC: 32 U/L
BACTERIA: NEGATIVE /HPF
BASOPHILS # BLD AUTO: 0.06 K/UL
BASOPHILS NFR BLD AUTO: 0.9 %
BILIRUB SERPL-MCNC: 0.2 MG/DL
BILIRUBIN URINE: NEGATIVE
BLOOD URINE: NEGATIVE
BUN SERPL-MCNC: 14 MG/DL
CALCIUM SERPL-MCNC: 10.2 MG/DL
CAST: 0 /LPF
CHLORIDE SERPL-SCNC: 100 MMOL/L
CO2 SERPL-SCNC: 24 MMOL/L
COLOR: YELLOW
CREAT SERPL-MCNC: 0.98 MG/DL
EGFR: 76 ML/MIN/1.73M2
EOSINOPHIL # BLD AUTO: 0.24 K/UL
EOSINOPHIL NFR BLD AUTO: 3.7 %
EPITHELIAL CELLS: 0 /HPF
GLUCOSE QUALITATIVE U: NEGATIVE MG/DL
GLUCOSE SERPL-MCNC: 92 MG/DL
HCT VFR BLD CALC: 39.3 %
HGB BLD-MCNC: 12.8 G/DL
IMM GRANULOCYTES NFR BLD AUTO: 0.3 %
KETONES URINE: NEGATIVE MG/DL
LEUKOCYTE ESTERASE URINE: ABNORMAL
LYMPHOCYTES # BLD AUTO: 1.26 K/UL
LYMPHOCYTES NFR BLD AUTO: 19.7 %
MAN DIFF?: NORMAL
MCHC RBC-ENTMCNC: 32.2 PG
MCHC RBC-ENTMCNC: 32.6 GM/DL
MCV RBC AUTO: 99 FL
MICROSCOPIC-UA: NORMAL
MONOCYTES # BLD AUTO: 0.75 K/UL
MONOCYTES NFR BLD AUTO: 11.7 %
NEUTROPHILS # BLD AUTO: 4.08 K/UL
NEUTROPHILS NFR BLD AUTO: 63.7 %
NITRITE URINE: NEGATIVE
PH URINE: 7.5
PLATELET # BLD AUTO: 292 K/UL
POTASSIUM SERPL-SCNC: 4.3 MMOL/L
PROT SERPL-MCNC: 7.6 G/DL
PROTEIN URINE: NEGATIVE MG/DL
RBC # BLD: 3.97 M/UL
RBC # FLD: 14.4 %
RED BLOOD CELLS URINE: 0 /HPF
SODIUM SERPL-SCNC: 136 MMOL/L
SPECIFIC GRAVITY URINE: 1.01
TROPONIN-T, HIGH SENSITIVITY: 68 NG/L
UROBILINOGEN URINE: 0.2 MG/DL
WBC # FLD AUTO: 6.41 K/UL
WHITE BLOOD CELLS URINE: 8 /HPF

## 2024-09-12 PROCEDURE — G2211 COMPLEX E/M VISIT ADD ON: CPT

## 2024-09-12 PROCEDURE — 93000 ELECTROCARDIOGRAM COMPLETE: CPT

## 2024-09-12 PROCEDURE — 99214 OFFICE O/P EST MOD 30 MIN: CPT

## 2024-09-12 NOTE — HISTORY OF PRESENT ILLNESS
[FreeTextEntry1] : 84M PMHx HTN, GERD, BPH, and CAD 84M PMHx HTN, GERD, BPH, and CAD  s/p PCI DESx 1 to LCx  o 8/29. present today for follow up. Patient was readmitted for feeling woozy and nauseous .Patient was on Bactrim and was discontinued on discharge.Patient was restarted on Losartan and was sent home on Zofran.c/o feeling tired and lightheaded Denies chest pain, SOB, COSTA, dizziness, diaphoresis, palpitations, LE swelling, orthopnea, syncope, n/v, headache.

## 2024-09-13 RX ORDER — CEFPODOXIME PROXETIL 200 MG/1
200 TABLET, FILM COATED ORAL
Qty: 14 | Refills: 0 | Status: ACTIVE | COMMUNITY
Start: 2024-09-13 | End: 1900-01-01

## 2024-09-15 NOTE — HISTORY OF PRESENT ILLNESS
Christine is in infusion for C7D8 Taxol.   #[id number] assisted with translation.  Christine states she has tinnitus in her right ear and intermittently in her left.  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  She will inform her provider of this. PIV started by VAT team to LFA.  Lab drawn and within parameters to treat.  Premedicated as ordered. Taxol was infused with non-DEHP tubing and filter in place.  PIV flushed and removed. Next appointment reviewed.  Christine was discharged ambulatory to home in stable condition.    [Discharge Summary] : discharge summary [Pertinent Labs] : pertinent labs [Radiology Findings] : radiology findings [Discharge Med List] : discharge medication list [Med Reconciliation] : medication reconciliation has been completed [Patient Contacted By: ____] : and contacted by [unfilled] [FreeTextEntry3] : 2  hospital stay [FreeTextEntry2] : 84M PMHx HTN, GERD, BPH, here for 2 HFU. Pt was 1st sent by me to ER on 8/27 for chest pain. Trops initially negative. CTA negative for PE/dissection. EF 45% Pt underwent  staged cardiac cath with stents to Lcx and LAD and was started on medical therapy. 3rd cath was done which showed patent stents. Pt also took bactrim for UTI and was discharged home. Pt then represented to ER few days later for nausea found to  have EDITH, hyponatremia thought to be secondary to bactrim. Losartan was held and cr improved.   Since discharge, pt without any chest pain but does not think his COSTA resolved back to baseline. Is awaiting cardiac rehab. Denies chest pain, sob,, dizziness, orthopnea, diaphoresis, palpitations, LE swelling, syncope, n/v, headache.

## 2024-09-15 NOTE — HISTORY OF PRESENT ILLNESS
[Discharge Summary] : discharge summary [Pertinent Labs] : pertinent labs [Radiology Findings] : radiology findings [Discharge Med List] : discharge medication list [Med Reconciliation] : medication reconciliation has been completed [Patient Contacted By: ____] : and contacted by [unfilled] [FreeTextEntry3] : 2  hospital stay [FreeTextEntry2] : 84M PMHx HTN, GERD, BPH, here for 2 HFU. Pt was 1st sent by me to ER on 8/27 for chest pain. Trops initially negative. CTA negative for PE/dissection. EF 45% Pt underwent  staged cardiac cath with stents to Lcx and LAD and was started on medical therapy. 3rd cath was done which showed patent stents. Pt also took bactrim for UTI and was discharged home. Pt then represented to ER few days later for nausea found to  have EDITH, hyponatremia thought to be secondary to bactrim. Losartan was held and cr improved.   Since discharge, pt without any chest pain but does not think his COSTA resolved back to baseline. Is awaiting cardiac rehab. Denies chest pain, sob,, dizziness, orthopnea, diaphoresis, palpitations, LE swelling, syncope, n/v, headache.

## 2024-09-15 NOTE — ADDENDUM
[FreeTextEntry1] : This note was written by Brenda Tanner on 09/11/2024 acting as medical scribe for Dr. Nakul Johnson. I, Dr. Nakul Johnson, have read and attest that all the information, medical decision making and discharge instructions within are true and accurate.

## 2024-09-16 LAB — BACTERIA UR CULT: ABNORMAL

## 2024-09-17 ENCOUNTER — TRANSCRIPTION ENCOUNTER (OUTPATIENT)
Age: 84
End: 2024-09-17

## 2024-09-19 ENCOUNTER — NON-APPOINTMENT (OUTPATIENT)
Age: 84
End: 2024-09-19

## 2024-09-19 LAB
ALBUMIN SERPL ELPH-MCNC: 4.2 G/DL
ALP BLD-CCNC: 124 U/L
ALT SERPL-CCNC: 37 U/L
AST SERPL-CCNC: 27 U/L
BASOPHILS # BLD AUTO: 0.05 K/UL
BASOPHILS NFR BLD AUTO: 0.8 %
BILIRUB DIRECT SERPL-MCNC: 0.1 MG/DL
BILIRUB INDIRECT SERPL-MCNC: 0.2 MG/DL
BILIRUB SERPL-MCNC: 0.3 MG/DL
CHOLEST SERPL-MCNC: 113 MG/DL
CK SERPL-CCNC: 54 U/L
EOSINOPHIL # BLD AUTO: 0.23 K/UL
EOSINOPHIL NFR BLD AUTO: 3.5 %
FERRITIN SERPL-MCNC: 211 NG/ML
FOLATE SERPL-MCNC: 12.7 NG/ML
GGT SERPL-CCNC: 50 U/L
HAPTOGLOB SERPL-MCNC: 80 MG/DL
HCT VFR BLD CALC: 38 %
HDLC SERPL-MCNC: 60 MG/DL
HGB BLD-MCNC: 12.3 G/DL
IMM GRANULOCYTES NFR BLD AUTO: 0.3 %
IRON SERPL-MCNC: 77 UG/DL
LDH SERPL-CCNC: 183 U/L
LDLC SERPL CALC-MCNC: 41 MG/DL
LDLC SERPL DIRECT ASSAY-MCNC: 43 MG/DL
LYMPHOCYTES # BLD AUTO: 1.28 K/UL
LYMPHOCYTES NFR BLD AUTO: 19.6 %
MAN DIFF?: NORMAL
MCHC RBC-ENTMCNC: 32.2 PG
MCHC RBC-ENTMCNC: 32.4 GM/DL
MCV RBC AUTO: 99.5 FL
MONOCYTES # BLD AUTO: 0.68 K/UL
MONOCYTES NFR BLD AUTO: 10.4 %
NEUTROPHILS # BLD AUTO: 4.27 K/UL
NEUTROPHILS NFR BLD AUTO: 65.4 %
NONHDLC SERPL-MCNC: 53 MG/DL
PLATELET # BLD AUTO: 288 K/UL
PROT SERPL-MCNC: 6.8 G/DL
RBC # BLD: 3.82 M/UL
RBC # FLD: 14.5 %
TRANSFERRIN SERPL-MCNC: 220 MG/DL
TRIGL SERPL-MCNC: 52 MG/DL
VIT B12 SERPL-MCNC: 498 PG/ML
WBC # FLD AUTO: 6.53 K/UL

## 2024-09-20 ENCOUNTER — APPOINTMENT (OUTPATIENT)
Dept: CARDIOLOGY | Facility: CLINIC | Age: 84
End: 2024-09-20
Payer: MEDICARE

## 2024-09-20 VITALS
OXYGEN SATURATION: 99 % | WEIGHT: 145 LBS | DIASTOLIC BLOOD PRESSURE: 60 MMHG | SYSTOLIC BLOOD PRESSURE: 120 MMHG | TEMPERATURE: 98 F | HEIGHT: 67 IN | BODY MASS INDEX: 22.76 KG/M2 | HEART RATE: 48 BPM

## 2024-09-20 DIAGNOSIS — N39.0 URINARY TRACT INFECTION, SITE NOT SPECIFIED: ICD-10-CM

## 2024-09-20 DIAGNOSIS — Z96.641 PRESENCE OF RIGHT ARTIFICIAL HIP JOINT: ICD-10-CM

## 2024-09-20 DIAGNOSIS — M79.89 OTHER SPECIFIED SOFT TISSUE DISORDERS: ICD-10-CM

## 2024-09-20 DIAGNOSIS — I25.10 ATHEROSCLEROTIC HEART DISEASE OF NATIVE CORONARY ARTERY W/OUT ANGINA PECTORIS: ICD-10-CM

## 2024-09-20 DIAGNOSIS — E78.00 PURE HYPERCHOLESTEROLEMIA, UNSPECIFIED: ICD-10-CM

## 2024-09-20 DIAGNOSIS — R06.02 SHORTNESS OF BREATH: ICD-10-CM

## 2024-09-20 DIAGNOSIS — R53.83 OTHER FATIGUE: ICD-10-CM

## 2024-09-20 DIAGNOSIS — I77.810 THORACIC AORTIC ECTASIA: ICD-10-CM

## 2024-09-20 DIAGNOSIS — R42 DIZZINESS AND GIDDINESS: ICD-10-CM

## 2024-09-20 DIAGNOSIS — Z13.228 ENCOUNTER FOR SCREENING FOR OTHER METABOLIC DISORDERS: ICD-10-CM

## 2024-09-20 DIAGNOSIS — J32.9 CHRONIC SINUSITIS, UNSPECIFIED: ICD-10-CM

## 2024-09-20 DIAGNOSIS — R82.90 UNSPECIFIED ABNORMAL FINDINGS IN URINE: ICD-10-CM

## 2024-09-20 DIAGNOSIS — R06.09 OTHER FORMS OF DYSPNEA: ICD-10-CM

## 2024-09-20 DIAGNOSIS — I50.20 UNSPECIFIED SYSTOLIC (CONGESTIVE) HEART FAILURE: ICD-10-CM

## 2024-09-20 PROCEDURE — 93000 ELECTROCARDIOGRAM COMPLETE: CPT

## 2024-09-20 PROCEDURE — 99214 OFFICE O/P EST MOD 30 MIN: CPT

## 2024-09-20 PROCEDURE — G2211 COMPLEX E/M VISIT ADD ON: CPT

## 2024-09-20 RX ORDER — ISOSORBIDE MONONITRATE 30 MG/1
30 TABLET, EXTENDED RELEASE ORAL
Qty: 90 | Refills: 1 | Status: ACTIVE | COMMUNITY
Start: 1900-01-01 | End: 1900-01-01

## 2024-09-20 RX ORDER — ATORVASTATIN CALCIUM 40 MG/1
40 TABLET, FILM COATED ORAL DAILY
Qty: 90 | Refills: 1 | Status: ACTIVE | COMMUNITY
Start: 1900-01-01 | End: 1900-01-01

## 2024-09-20 RX ORDER — CLOPIDOGREL BISULFATE 75 MG/1
75 TABLET, FILM COATED ORAL DAILY
Qty: 90 | Refills: 1 | Status: ACTIVE | COMMUNITY
Start: 1900-01-01 | End: 1900-01-01

## 2024-09-20 NOTE — HISTORY OF PRESENT ILLNESS
[FreeTextEntry1] : This is an 85 y/o male with a pmhx of HTN, GERD, BPH, and CAD 84M PMHx HTN, GERD, BPH, and CAD s/p PCI DESx 1 to LCx o 8/29. present today for follow up.  Last seen 9/12/24 and amlodipine reduced to 5 mg po qd.  Patient noted with urine cx 10-49K ecoli, started on start cefpodoxime 200mg bid. Patient completed antibiotics, reports resolution of symptoms, will be seeing urology in a few weeks.  Patient continues to report intermittent lightheadedness which he attributes to sinus issue. Patient admits to constant sinus pressure, feels pressure behind eyes. He had CT sinus 2/2024 showing mild sinus disease.  Patient admits to COSTA, overall improving.  Patient reports swelling of right foot s/p hip surgery 3/2024.  Patient reports numbness of right thigh post surgery.  Patient denies chest pain, palpitations, dizziness, syncope, changes in bowel/bladder habits or appetite.

## 2024-09-20 NOTE — PHYSICAL EXAM
[Well Developed] : well developed [Well Nourished] : well nourished [No Acute Distress] : no acute distress [Normal Conjunctiva] : normal conjunctiva [Normal Venous Pressure] : normal venous pressure [No Carotid Bruit] : no carotid bruit [Normal S1, S2] : normal S1, S2 [No Murmur] : no murmur [No Rub] : no rub [No Gallop] : no gallop [Clear Lung Fields] : clear lung fields [Good Air Entry] : good air entry Detail Level: Simple [No Respiratory Distress] : no respiratory distress  Counseling: I discussed the itch-scratch cycle. I explained that scratching will lead to more itching and this cycle becomes self perpetuating. I discussed methods to prevent scratching in an effort to stop the patient's itching. [Soft] : abdomen soft [Non Tender] : non-tender [No Masses/organomegaly] : no masses/organomegaly [Normal Bowel Sounds] : normal bowel sounds [Normal Gait] : normal gait [No Edema] : no edema [No Cyanosis] : no cyanosis [No Clubbing] : no clubbing [No Varicosities] : no varicosities [No Rash] : no rash [No Skin Lesions] : no skin lesions [Moves all extremities] : moves all extremities [No Focal Deficits] : no focal deficits [Normal Speech] : normal speech [Alert and Oriented] : alert and oriented [Normal memory] : normal memory

## 2024-09-24 ENCOUNTER — TRANSCRIPTION ENCOUNTER (OUTPATIENT)
Age: 84
End: 2024-09-24

## 2024-09-26 ENCOUNTER — APPOINTMENT (OUTPATIENT)
Dept: VASCULAR SURGERY | Facility: CLINIC | Age: 84
End: 2024-09-26
Payer: MEDICARE

## 2024-09-26 VITALS — SYSTOLIC BLOOD PRESSURE: 159 MMHG | DIASTOLIC BLOOD PRESSURE: 64 MMHG | HEART RATE: 46 BPM

## 2024-09-26 VITALS
HEART RATE: 46 BPM | BODY MASS INDEX: 22.43 KG/M2 | SYSTOLIC BLOOD PRESSURE: 159 MMHG | DIASTOLIC BLOOD PRESSURE: 76 MMHG | WEIGHT: 148 LBS | HEIGHT: 68 IN

## 2024-09-26 DIAGNOSIS — I83.893 VARICOSE VEINS OF BILATERAL LOWER EXTREMITIES WITH OTHER COMPLICATIONS: ICD-10-CM

## 2024-09-26 PROCEDURE — 93970 EXTREMITY STUDY: CPT

## 2024-09-26 PROCEDURE — 99204 OFFICE O/P NEW MOD 45 MIN: CPT

## 2024-09-28 ENCOUNTER — NON-APPOINTMENT (OUTPATIENT)
Age: 84
End: 2024-09-28

## 2024-09-28 LAB
ANION GAP SERPL CALC-SCNC: 11 MMOL/L
APPEARANCE: CLEAR
BACTERIA UR CULT: NORMAL
BACTERIA: NEGATIVE /HPF
BASOPHILS # BLD AUTO: 0.06 K/UL
BASOPHILS NFR BLD AUTO: 1 %
BILIRUBIN URINE: NEGATIVE
BLOOD URINE: NEGATIVE
BUN SERPL-MCNC: 16 MG/DL
CALCIUM SERPL-MCNC: 10 MG/DL
CAST: 0 /LPF
CHLORIDE SERPL-SCNC: 101 MMOL/L
CO2 SERPL-SCNC: 24 MMOL/L
COLOR: YELLOW
CREAT SERPL-MCNC: 1.03 MG/DL
EGFR: 72 ML/MIN/1.73M2
EOSINOPHIL # BLD AUTO: 0.2 K/UL
EOSINOPHIL NFR BLD AUTO: 3.3 %
EPITHELIAL CELLS: 0 /HPF
ESTIMATED AVERAGE GLUCOSE: 103 MG/DL
GLUCOSE QUALITATIVE U: NEGATIVE MG/DL
GLUCOSE SERPL-MCNC: 90 MG/DL
HBA1C MFR BLD HPLC: 5.2 %
HCT VFR BLD CALC: 41 %
HGB BLD-MCNC: 13.4 G/DL
IMM GRANULOCYTES NFR BLD AUTO: 0.3 %
KETONES URINE: NEGATIVE MG/DL
LEUKOCYTE ESTERASE URINE: NEGATIVE
LYMPHOCYTES # BLD AUTO: 1.22 K/UL
LYMPHOCYTES NFR BLD AUTO: 19.9 %
MAN DIFF?: NORMAL
MCHC RBC-ENTMCNC: 32.6 PG
MCHC RBC-ENTMCNC: 32.7 GM/DL
MCV RBC AUTO: 99.8 FL
MICROSCOPIC-UA: NORMAL
MONOCYTES # BLD AUTO: 0.69 K/UL
MONOCYTES NFR BLD AUTO: 11.2 %
NEUTROPHILS # BLD AUTO: 3.95 K/UL
NEUTROPHILS NFR BLD AUTO: 64.3 %
NITRITE URINE: NEGATIVE
NT-PROBNP SERPL-MCNC: 655 PG/ML
PH URINE: 7
PLATELET # BLD AUTO: 229 K/UL
POTASSIUM SERPL-SCNC: 4.6 MMOL/L
PROTEIN URINE: NEGATIVE MG/DL
RBC # BLD: 4.11 M/UL
RBC # FLD: 14.8 %
RED BLOOD CELLS URINE: 0 /HPF
SODIUM SERPL-SCNC: 136 MMOL/L
SPECIFIC GRAVITY URINE: 1.02
T4 FREE SERPL-MCNC: 1.4 NG/DL
TSH SERPL-ACNC: 3.53 UIU/ML
UROBILINOGEN URINE: 0.2 MG/DL
WBC # FLD AUTO: 6.14 K/UL
WHITE BLOOD CELLS URINE: 1 /HPF

## 2024-09-30 LAB
ANION GAP SERPL CALC-SCNC: 15 MMOL/L
BUN SERPL-MCNC: 21 MG/DL
CALCIUM SERPL-MCNC: 10.2 MG/DL
CHLORIDE SERPL-SCNC: 100 MMOL/L
CO2 SERPL-SCNC: 24 MMOL/L
CREAT SERPL-MCNC: 1.23 MG/DL
EGFR: 58 ML/MIN/1.73M2
GLUCOSE SERPL-MCNC: 102 MG/DL
NT-PROBNP SERPL-MCNC: 444 PG/ML
POTASSIUM SERPL-SCNC: 4.2 MMOL/L
SODIUM SERPL-SCNC: 138 MMOL/L

## 2024-10-07 ENCOUNTER — RESULT REVIEW (OUTPATIENT)
Age: 84
End: 2024-10-07

## 2024-10-08 ENCOUNTER — APPOINTMENT (OUTPATIENT)
Dept: ORTHOPEDIC SURGERY | Facility: CLINIC | Age: 84
End: 2024-10-08
Payer: MEDICARE

## 2024-10-08 VITALS — HEIGHT: 68 IN | BODY MASS INDEX: 22.43 KG/M2 | WEIGHT: 148 LBS

## 2024-10-08 DIAGNOSIS — Z96.641 PRESENCE OF RIGHT ARTIFICIAL HIP JOINT: ICD-10-CM

## 2024-10-08 PROCEDURE — 73502 X-RAY EXAM HIP UNI 2-3 VIEWS: CPT

## 2024-10-08 PROCEDURE — 99213 OFFICE O/P EST LOW 20 MIN: CPT

## 2024-10-08 PROCEDURE — G2211 COMPLEX E/M VISIT ADD ON: CPT

## 2024-10-15 ENCOUNTER — APPOINTMENT (OUTPATIENT)
Dept: INTERNAL MEDICINE | Facility: CLINIC | Age: 84
End: 2024-10-15
Payer: MEDICARE

## 2024-10-15 VITALS
BODY MASS INDEX: 22.13 KG/M2 | HEART RATE: 55 BPM | WEIGHT: 146 LBS | SYSTOLIC BLOOD PRESSURE: 159 MMHG | OXYGEN SATURATION: 99 % | DIASTOLIC BLOOD PRESSURE: 72 MMHG | HEIGHT: 68 IN

## 2024-10-15 DIAGNOSIS — N39.0 URINARY TRACT INFECTION, SITE NOT SPECIFIED: ICD-10-CM

## 2024-10-15 DIAGNOSIS — I10 ESSENTIAL (PRIMARY) HYPERTENSION: ICD-10-CM

## 2024-10-15 PROCEDURE — G0008: CPT

## 2024-10-15 PROCEDURE — 99214 OFFICE O/P EST MOD 30 MIN: CPT

## 2024-10-15 PROCEDURE — G2211 COMPLEX E/M VISIT ADD ON: CPT

## 2024-10-15 PROCEDURE — 90662 IIV NO PRSV INCREASED AG IM: CPT

## 2024-10-16 LAB
APPEARANCE: CLEAR
BACTERIA: NEGATIVE /HPF
BILIRUBIN URINE: NEGATIVE
BLOOD URINE: NEGATIVE
CAST: 1 /LPF
COLOR: YELLOW
EPITHELIAL CELLS: 0 /HPF
GLUCOSE QUALITATIVE U: NEGATIVE MG/DL
KETONES URINE: NEGATIVE MG/DL
LEUKOCYTE ESTERASE URINE: NEGATIVE
MICROSCOPIC-UA: NORMAL
NITRITE URINE: NEGATIVE
PH URINE: 6.5
PROTEIN URINE: NEGATIVE MG/DL
RED BLOOD CELLS URINE: 0 /HPF
SPECIFIC GRAVITY URINE: 1.01
UROBILINOGEN URINE: 0.2 MG/DL
WHITE BLOOD CELLS URINE: 0 /HPF

## 2024-10-17 ENCOUNTER — NON-APPOINTMENT (OUTPATIENT)
Age: 84
End: 2024-10-17

## 2024-10-17 LAB — BACTERIA UR CULT: NORMAL

## 2024-11-12 ENCOUNTER — APPOINTMENT (OUTPATIENT)
Dept: CARDIOLOGY | Facility: CLINIC | Age: 84
End: 2024-11-12
Payer: MEDICARE

## 2024-11-12 VITALS
DIASTOLIC BLOOD PRESSURE: 60 MMHG | RESPIRATION RATE: 16 BRPM | SYSTOLIC BLOOD PRESSURE: 108 MMHG | HEART RATE: 51 BPM | OXYGEN SATURATION: 97 % | HEIGHT: 68 IN | TEMPERATURE: 98.1 F | WEIGHT: 148 LBS | BODY MASS INDEX: 22.43 KG/M2

## 2024-11-12 DIAGNOSIS — R06.09 OTHER FORMS OF DYSPNEA: ICD-10-CM

## 2024-11-12 DIAGNOSIS — R53.83 OTHER FATIGUE: ICD-10-CM

## 2024-11-12 DIAGNOSIS — I25.10 ATHEROSCLEROTIC HEART DISEASE OF NATIVE CORONARY ARTERY W/OUT ANGINA PECTORIS: ICD-10-CM

## 2024-11-12 DIAGNOSIS — E78.00 PURE HYPERCHOLESTEROLEMIA, UNSPECIFIED: ICD-10-CM

## 2024-11-12 DIAGNOSIS — M79.89 OTHER SPECIFIED SOFT TISSUE DISORDERS: ICD-10-CM

## 2024-11-12 DIAGNOSIS — M54.50 LOW BACK PAIN, UNSPECIFIED: ICD-10-CM

## 2024-11-12 DIAGNOSIS — N39.0 URINARY TRACT INFECTION, SITE NOT SPECIFIED: ICD-10-CM

## 2024-11-12 DIAGNOSIS — Z96.641 PRESENCE OF RIGHT ARTIFICIAL HIP JOINT: ICD-10-CM

## 2024-11-12 DIAGNOSIS — J32.9 CHRONIC SINUSITIS, UNSPECIFIED: ICD-10-CM

## 2024-11-12 DIAGNOSIS — R91.8 OTHER NONSPECIFIC ABNORMAL FINDING OF LUNG FIELD: ICD-10-CM

## 2024-11-12 PROCEDURE — 99214 OFFICE O/P EST MOD 30 MIN: CPT

## 2024-11-12 PROCEDURE — G2211 COMPLEX E/M VISIT ADD ON: CPT

## 2024-11-12 PROCEDURE — 93000 ELECTROCARDIOGRAM COMPLETE: CPT

## 2024-11-13 ENCOUNTER — APPOINTMENT (OUTPATIENT)
Dept: INTERNAL MEDICINE | Facility: CLINIC | Age: 84
End: 2024-11-13
Payer: MEDICARE

## 2024-11-13 VITALS
BODY MASS INDEX: 22.13 KG/M2 | TEMPERATURE: 98.6 F | OXYGEN SATURATION: 98 % | SYSTOLIC BLOOD PRESSURE: 146 MMHG | HEIGHT: 68 IN | HEART RATE: 58 BPM | WEIGHT: 146 LBS | DIASTOLIC BLOOD PRESSURE: 64 MMHG

## 2024-11-13 VITALS — DIASTOLIC BLOOD PRESSURE: 66 MMHG | SYSTOLIC BLOOD PRESSURE: 126 MMHG

## 2024-11-13 DIAGNOSIS — I10 ESSENTIAL (PRIMARY) HYPERTENSION: ICD-10-CM

## 2024-11-13 DIAGNOSIS — I50.20 UNSPECIFIED SYSTOLIC (CONGESTIVE) HEART FAILURE: ICD-10-CM

## 2024-11-13 DIAGNOSIS — R82.90 UNSPECIFIED ABNORMAL FINDINGS IN URINE: ICD-10-CM

## 2024-11-13 DIAGNOSIS — I77.810 THORACIC AORTIC ECTASIA: ICD-10-CM

## 2024-11-13 DIAGNOSIS — R42 DIZZINESS AND GIDDINESS: ICD-10-CM

## 2024-11-13 PROCEDURE — 99214 OFFICE O/P EST MOD 30 MIN: CPT

## 2024-11-14 LAB
ALBUMIN SERPL ELPH-MCNC: 4.4 G/DL
ALP BLD-CCNC: 118 U/L
ALT SERPL-CCNC: 24 U/L
ANION GAP SERPL CALC-SCNC: 15 MMOL/L
APPEARANCE: CLEAR
AST SERPL-CCNC: 24 U/L
BACTERIA: NEGATIVE /HPF
BASOPHILS # BLD AUTO: 0.05 K/UL
BASOPHILS NFR BLD AUTO: 0.6 %
BILIRUB SERPL-MCNC: 0.4 MG/DL
BILIRUBIN URINE: NEGATIVE
BLOOD URINE: NEGATIVE
BUN SERPL-MCNC: 20 MG/DL
CALCIUM SERPL-MCNC: 9.9 MG/DL
CAST: 0 /LPF
CHLORIDE SERPL-SCNC: 98 MMOL/L
CO2 SERPL-SCNC: 23 MMOL/L
COLOR: YELLOW
CREAT SERPL-MCNC: 1.27 MG/DL
EGFR: 56 ML/MIN/1.73M2
EOSINOPHIL # BLD AUTO: 0.14 K/UL
EOSINOPHIL NFR BLD AUTO: 1.6 %
EPITHELIAL CELLS: 0 /HPF
GLUCOSE QUALITATIVE U: NEGATIVE MG/DL
GLUCOSE SERPL-MCNC: 88 MG/DL
HCT VFR BLD CALC: 44.2 %
HGB BLD-MCNC: 14.5 G/DL
IMM GRANULOCYTES NFR BLD AUTO: 0.3 %
KETONES URINE: NEGATIVE MG/DL
LEUKOCYTE ESTERASE URINE: NEGATIVE
LYMPHOCYTES # BLD AUTO: 1.21 K/UL
LYMPHOCYTES NFR BLD AUTO: 13.6 %
MAN DIFF?: NORMAL
MCHC RBC-ENTMCNC: 32.4 PG
MCHC RBC-ENTMCNC: 32.8 G/DL
MCV RBC AUTO: 98.7 FL
MICROSCOPIC-UA: NORMAL
MONOCYTES # BLD AUTO: 0.89 K/UL
MONOCYTES NFR BLD AUTO: 10 %
NEUTROPHILS # BLD AUTO: 6.59 K/UL
NEUTROPHILS NFR BLD AUTO: 73.9 %
NITRITE URINE: NEGATIVE
NT-PROBNP SERPL-MCNC: 218 PG/ML
PH URINE: 6.5
PLATELET # BLD AUTO: 220 K/UL
POTASSIUM SERPL-SCNC: 4.5 MMOL/L
PROT SERPL-MCNC: 7 G/DL
PROTEIN URINE: NEGATIVE MG/DL
RBC # BLD: 4.48 M/UL
RBC # FLD: 13.3 %
RED BLOOD CELLS URINE: 0 /HPF
SODIUM SERPL-SCNC: 135 MMOL/L
SPECIFIC GRAVITY URINE: 1.02
UROBILINOGEN URINE: 0.2 MG/DL
WBC # FLD AUTO: 8.91 K/UL
WHITE BLOOD CELLS URINE: 0 /HPF

## 2024-11-15 LAB — BACTERIA UR CULT: NORMAL

## 2024-12-13 ENCOUNTER — APPOINTMENT (OUTPATIENT)
Dept: INTERNAL MEDICINE | Facility: CLINIC | Age: 84
End: 2024-12-13
Payer: MEDICARE

## 2024-12-13 VITALS
DIASTOLIC BLOOD PRESSURE: 73 MMHG | BODY MASS INDEX: 21.98 KG/M2 | HEART RATE: 56 BPM | WEIGHT: 145 LBS | SYSTOLIC BLOOD PRESSURE: 165 MMHG | HEIGHT: 68 IN

## 2024-12-13 DIAGNOSIS — I10 ESSENTIAL (PRIMARY) HYPERTENSION: ICD-10-CM

## 2024-12-13 PROCEDURE — 99213 OFFICE O/P EST LOW 20 MIN: CPT

## 2025-01-21 ENCOUNTER — APPOINTMENT (OUTPATIENT)
Dept: INTERNAL MEDICINE | Facility: CLINIC | Age: 85
End: 2025-01-21
Payer: MEDICARE

## 2025-01-21 VITALS
WEIGHT: 145 LBS | HEIGHT: 68 IN | OXYGEN SATURATION: 99 % | SYSTOLIC BLOOD PRESSURE: 157 MMHG | DIASTOLIC BLOOD PRESSURE: 58 MMHG | BODY MASS INDEX: 21.98 KG/M2 | HEART RATE: 58 BPM

## 2025-01-21 DIAGNOSIS — I10 ESSENTIAL (PRIMARY) HYPERTENSION: ICD-10-CM

## 2025-01-21 PROCEDURE — 36415 COLL VENOUS BLD VENIPUNCTURE: CPT

## 2025-01-21 PROCEDURE — 99214 OFFICE O/P EST MOD 30 MIN: CPT

## 2025-01-21 PROCEDURE — G0444 DEPRESSION SCREEN ANNUAL: CPT | Mod: 59

## 2025-01-21 PROCEDURE — G2211 COMPLEX E/M VISIT ADD ON: CPT

## 2025-01-22 ENCOUNTER — NON-APPOINTMENT (OUTPATIENT)
Age: 85
End: 2025-01-22

## 2025-02-19 NOTE — PATIENT PROFILE ADULT - TRANSPORTATION
The catheter was inserted into the ostium   right coronary artery. An angiography was performed of the right coronary arteries. Multiple views were taken. no

## 2025-03-05 ENCOUNTER — RX RENEWAL (OUTPATIENT)
Age: 85
End: 2025-03-05

## 2025-03-07 ENCOUNTER — RESULT REVIEW (OUTPATIENT)
Age: 85
End: 2025-03-07

## 2025-03-11 ENCOUNTER — LABORATORY RESULT (OUTPATIENT)
Age: 85
End: 2025-03-11

## 2025-03-11 ENCOUNTER — APPOINTMENT (OUTPATIENT)
Dept: CARDIOLOGY | Facility: CLINIC | Age: 85
End: 2025-03-11
Payer: MEDICARE

## 2025-03-11 VITALS
HEART RATE: 47 BPM | BODY MASS INDEX: 22.73 KG/M2 | SYSTOLIC BLOOD PRESSURE: 140 MMHG | WEIGHT: 150 LBS | TEMPERATURE: 97.9 F | HEIGHT: 68 IN | DIASTOLIC BLOOD PRESSURE: 70 MMHG | OXYGEN SATURATION: 99 %

## 2025-03-11 DIAGNOSIS — M79.89 OTHER SPECIFIED SOFT TISSUE DISORDERS: ICD-10-CM

## 2025-03-11 DIAGNOSIS — I50.20 UNSPECIFIED SYSTOLIC (CONGESTIVE) HEART FAILURE: ICD-10-CM

## 2025-03-11 DIAGNOSIS — R20.0 ANESTHESIA OF SKIN: ICD-10-CM

## 2025-03-11 DIAGNOSIS — M54.50 LOW BACK PAIN, UNSPECIFIED: ICD-10-CM

## 2025-03-11 DIAGNOSIS — Z96.641 PRESENCE OF RIGHT ARTIFICIAL HIP JOINT: ICD-10-CM

## 2025-03-11 DIAGNOSIS — R06.09 OTHER FORMS OF DYSPNEA: ICD-10-CM

## 2025-03-11 DIAGNOSIS — R53.83 OTHER FATIGUE: ICD-10-CM

## 2025-03-11 DIAGNOSIS — J32.9 CHRONIC SINUSITIS, UNSPECIFIED: ICD-10-CM

## 2025-03-11 DIAGNOSIS — I10 ESSENTIAL (PRIMARY) HYPERTENSION: ICD-10-CM

## 2025-03-11 DIAGNOSIS — E78.00 PURE HYPERCHOLESTEROLEMIA, UNSPECIFIED: ICD-10-CM

## 2025-03-11 DIAGNOSIS — Z13.228 ENCOUNTER FOR SCREENING FOR OTHER METABOLIC DISORDERS: ICD-10-CM

## 2025-03-11 DIAGNOSIS — I77.810 THORACIC AORTIC ECTASIA: ICD-10-CM

## 2025-03-11 DIAGNOSIS — R91.8 OTHER NONSPECIFIC ABNORMAL FINDING OF LUNG FIELD: ICD-10-CM

## 2025-03-11 DIAGNOSIS — R00.1 BRADYCARDIA, UNSPECIFIED: ICD-10-CM

## 2025-03-11 DIAGNOSIS — Z12.11 ENCOUNTER FOR SCREENING FOR MALIGNANT NEOPLASM OF COLON: ICD-10-CM

## 2025-03-11 DIAGNOSIS — I25.10 ATHEROSCLEROTIC HEART DISEASE OF NATIVE CORONARY ARTERY W/OUT ANGINA PECTORIS: ICD-10-CM

## 2025-03-11 DIAGNOSIS — N39.0 URINARY TRACT INFECTION, SITE NOT SPECIFIED: ICD-10-CM

## 2025-03-11 DIAGNOSIS — R42 DIZZINESS AND GIDDINESS: ICD-10-CM

## 2025-03-11 PROCEDURE — 99214 OFFICE O/P EST MOD 30 MIN: CPT

## 2025-03-11 PROCEDURE — G2211 COMPLEX E/M VISIT ADD ON: CPT

## 2025-03-11 PROCEDURE — 93000 ELECTROCARDIOGRAM COMPLETE: CPT

## 2025-03-12 ENCOUNTER — NON-APPOINTMENT (OUTPATIENT)
Age: 85
End: 2025-03-12

## 2025-03-12 LAB
25(OH)D3 SERPL-MCNC: 36.7 NG/ML
ALBUMIN SERPL ELPH-MCNC: 4.6 G/DL
ALP BLD-CCNC: 131 U/L
ALT SERPL-CCNC: 27 U/L
ANION GAP SERPL CALC-SCNC: 14 MMOL/L
APPEARANCE: CLEAR
AST SERPL-CCNC: 23 U/L
BACTERIA UR CULT: NORMAL
BACTERIA: NEGATIVE /HPF
BASOPHILS # BLD AUTO: 0.06 K/UL
BASOPHILS NFR BLD AUTO: 1 %
BILIRUB SERPL-MCNC: 0.5 MG/DL
BILIRUBIN URINE: NEGATIVE
BLOOD URINE: NEGATIVE
BUN SERPL-MCNC: 19 MG/DL
CALCIUM SERPL-MCNC: 10.3 MG/DL
CAST: 0 /LPF
CCP AB SER IA-ACNC: <8 U/ML
CHLORIDE SERPL-SCNC: 100 MMOL/L
CHOLEST SERPL-MCNC: 120 MG/DL
CK SERPL-CCNC: 67 U/L
CO2 SERPL-SCNC: 23 MMOL/L
COLOR: YELLOW
CREAT SERPL-MCNC: 1.07 MG/DL
CRP SERPL-MCNC: <3 MG/L
DSDNA AB SER-ACNC: <1 IU/ML
EGFRCR SERPLBLD CKD-EPI 2021: 68 ML/MIN/1.73M2
EOSINOPHIL # BLD AUTO: 0.14 K/UL
EOSINOPHIL NFR BLD AUTO: 2.4 %
EPITHELIAL CELLS: 0 /HPF
ERYTHROCYTE [SEDIMENTATION RATE] IN BLOOD BY WESTERGREN METHOD: 5 MM/HR
ESTIMATED AVERAGE GLUCOSE: 103 MG/DL
FERRITIN SERPL-MCNC: 129 NG/ML
FOLATE SERPL-MCNC: 12.2 NG/ML
GLUCOSE QUALITATIVE U: NEGATIVE MG/DL
GLUCOSE SERPL-MCNC: 92 MG/DL
HBA1C MFR BLD HPLC: 5.2 %
HCT VFR BLD CALC: 43.6 %
HDLC SERPL-MCNC: 61 MG/DL
HGB BLD-MCNC: 14.4 G/DL
IMM GRANULOCYTES NFR BLD AUTO: 0.3 %
IRON SATN MFR SERPL: 36 %
IRON SERPL-MCNC: 104 UG/DL
KETONES URINE: NEGATIVE MG/DL
LDLC SERPL CALC-MCNC: 44 MG/DL
LEUKOCYTE ESTERASE URINE: NEGATIVE
LYMPHOCYTES # BLD AUTO: 1.24 K/UL
LYMPHOCYTES NFR BLD AUTO: 21.3 %
MAGNESIUM SERPL-MCNC: 2.2 MG/DL
MAN DIFF?: NORMAL
MCHC RBC-ENTMCNC: 33 G/DL
MCHC RBC-ENTMCNC: 33 PG
MCV RBC AUTO: 99.8 FL
MICROSCOPIC-UA: NORMAL
MONOCYTES # BLD AUTO: 0.76 K/UL
MONOCYTES NFR BLD AUTO: 13.1 %
NEUTROPHILS # BLD AUTO: 3.6 K/UL
NEUTROPHILS NFR BLD AUTO: 61.9 %
NITRITE URINE: NEGATIVE
NONHDLC SERPL-MCNC: 59 MG/DL
NT-PROBNP SERPL-MCNC: 122 PG/ML
PH URINE: 6.5
PHOSPHATE SERPL-MCNC: 2.8 MG/DL
PLATELET # BLD AUTO: 199 K/UL
POTASSIUM SERPL-SCNC: 4.7 MMOL/L
PROT SERPL-MCNC: 6.7 G/DL
PROTEIN URINE: NEGATIVE MG/DL
RBC # BLD: 4.37 M/UL
RBC # FLD: 14 %
RED BLOOD CELLS URINE: 0 /HPF
RF+CCP IGG SER-IMP: NEGATIVE
RHEUMATOID FACT SER QL: <10 IU/ML
SODIUM SERPL-SCNC: 137 MMOL/L
SPECIFIC GRAVITY URINE: 1.02
T4 FREE SERPL-MCNC: 1.3 NG/DL
TIBC SERPL-MCNC: 288 UG/DL
TRANSFERRIN SERPL-MCNC: 242 MG/DL
TRIGL SERPL-MCNC: 74 MG/DL
TSH SERPL-ACNC: 2.97 UIU/ML
UIBC SERPL-MCNC: 184 UG/DL
URATE SERPL-MCNC: 5.5 MG/DL
UROBILINOGEN URINE: 0.2 MG/DL
VIT B12 SERPL-MCNC: 421 PG/ML
WBC # FLD AUTO: 5.82 K/UL
WHITE BLOOD CELLS URINE: 0 /HPF

## 2025-03-13 ENCOUNTER — NON-APPOINTMENT (OUTPATIENT)
Age: 85
End: 2025-03-13

## 2025-03-13 ENCOUNTER — APPOINTMENT (OUTPATIENT)
Dept: CT IMAGING | Facility: CLINIC | Age: 85
End: 2025-03-13
Payer: MEDICARE

## 2025-03-13 LAB — ANA SER IF-ACNC: NEGATIVE

## 2025-03-13 PROCEDURE — 75574 CT ANGIO HRT W/3D IMAGE: CPT

## 2025-03-17 ENCOUNTER — OUTPATIENT (OUTPATIENT)
Dept: OUTPATIENT SERVICES | Facility: HOSPITAL | Age: 85
LOS: 1 days | End: 2025-03-17
Payer: MEDICARE

## 2025-03-17 ENCOUNTER — TRANSCRIPTION ENCOUNTER (OUTPATIENT)
Age: 85
End: 2025-03-17

## 2025-03-17 VITALS
RESPIRATION RATE: 16 BRPM | TEMPERATURE: 98 F | SYSTOLIC BLOOD PRESSURE: 187 MMHG | HEIGHT: 69 IN | WEIGHT: 145.95 LBS | DIASTOLIC BLOOD PRESSURE: 79 MMHG | HEART RATE: 50 BPM | OXYGEN SATURATION: 100 %

## 2025-03-17 VITALS — TEMPERATURE: 97 F

## 2025-03-17 DIAGNOSIS — Z95.5 PRESENCE OF CORONARY ANGIOPLASTY IMPLANT AND GRAFT: Chronic | ICD-10-CM

## 2025-03-17 DIAGNOSIS — Z90.49 ACQUIRED ABSENCE OF OTHER SPECIFIED PARTS OF DIGESTIVE TRACT: Chronic | ICD-10-CM

## 2025-03-17 DIAGNOSIS — Z90.89 ACQUIRED ABSENCE OF OTHER ORGANS: Chronic | ICD-10-CM

## 2025-03-17 DIAGNOSIS — I25.10 ATHEROSCLEROTIC HEART DISEASE OF NATIVE CORONARY ARTERY WITHOUT ANGINA PECTORIS: ICD-10-CM

## 2025-03-17 LAB
ANION GAP SERPL CALC-SCNC: 14 MMOL/L — SIGNIFICANT CHANGE UP (ref 5–17)
CALCIUM SERPL-MCNC: 10 MG/DL — SIGNIFICANT CHANGE UP (ref 8.4–10.5)
CHLORIDE SERPL-SCNC: 94 MMOL/L — LOW (ref 96–108)
CO2 SERPL-SCNC: 24 MMOL/L — SIGNIFICANT CHANGE UP (ref 22–31)
EGFR: 68 ML/MIN/1.73M2 — SIGNIFICANT CHANGE UP
EGFR: 68 ML/MIN/1.73M2 — SIGNIFICANT CHANGE UP
GLUCOSE SERPL-MCNC: 81 MG/DL — SIGNIFICANT CHANGE UP (ref 70–99)
HCT VFR BLD CALC: 45.5 % — SIGNIFICANT CHANGE UP (ref 39–50)
HGB BLD-MCNC: 15.3 G/DL — SIGNIFICANT CHANGE UP (ref 13–17)
MCHC RBC-ENTMCNC: 33.1 PG — SIGNIFICANT CHANGE UP (ref 27–34)
MCHC RBC-ENTMCNC: 33.6 G/DL — SIGNIFICANT CHANGE UP (ref 32–36)
MCV RBC AUTO: 98.5 FL — SIGNIFICANT CHANGE UP (ref 80–100)
NRBC BLD AUTO-RTO: 0 /100 WBCS — SIGNIFICANT CHANGE UP (ref 0–0)
PLATELET # BLD AUTO: 211 K/UL — SIGNIFICANT CHANGE UP (ref 150–400)
POTASSIUM SERPL-MCNC: 4.4 MMOL/L — SIGNIFICANT CHANGE UP (ref 3.5–5.3)
POTASSIUM SERPL-SCNC: 4.4 MMOL/L — SIGNIFICANT CHANGE UP (ref 3.5–5.3)
RBC # FLD: 13.2 % — SIGNIFICANT CHANGE UP (ref 10.3–14.5)
SODIUM SERPL-SCNC: 132 MMOL/L — LOW (ref 135–145)
WBC # BLD: 6.87 K/UL — SIGNIFICANT CHANGE UP (ref 3.8–10.5)
WBC # FLD AUTO: 6.87 K/UL — SIGNIFICANT CHANGE UP (ref 3.8–10.5)

## 2025-03-17 PROCEDURE — 80048 BASIC METABOLIC PNL TOTAL CA: CPT

## 2025-03-17 PROCEDURE — 92928 PRQ TCAT PLMT NTRAC ST 1 LES: CPT | Mod: RC

## 2025-03-17 PROCEDURE — C1874: CPT

## 2025-03-17 PROCEDURE — C9600: CPT | Mod: RC

## 2025-03-17 PROCEDURE — 93010 ELECTROCARDIOGRAM REPORT: CPT | Mod: 77

## 2025-03-17 PROCEDURE — 99152 MOD SED SAME PHYS/QHP 5/>YRS: CPT

## 2025-03-17 PROCEDURE — 93458 L HRT ARTERY/VENTRICLE ANGIO: CPT | Mod: 59

## 2025-03-17 PROCEDURE — C1769: CPT

## 2025-03-17 PROCEDURE — 93010 ELECTROCARDIOGRAM REPORT: CPT

## 2025-03-17 PROCEDURE — C1894: CPT

## 2025-03-17 PROCEDURE — C1887: CPT

## 2025-03-17 PROCEDURE — 93458 L HRT ARTERY/VENTRICLE ANGIO: CPT | Mod: 26,59

## 2025-03-17 PROCEDURE — 85027 COMPLETE CBC AUTOMATED: CPT

## 2025-03-17 PROCEDURE — 36415 COLL VENOUS BLD VENIPUNCTURE: CPT

## 2025-03-17 PROCEDURE — 93005 ELECTROCARDIOGRAM TRACING: CPT

## 2025-03-17 RX ADMIN — Medication 75 MILLILITER(S): at 10:39

## 2025-03-17 RX ADMIN — Medication 75 MILLILITER(S): at 12:06

## 2025-03-17 RX ADMIN — Medication 500 MILLILITER(S): at 10:39

## 2025-03-17 NOTE — ASU DISCHARGE PLAN (ADULT/PEDIATRIC) - CALL YOUR DOCTOR IF YOU HAVE ANY OF THE FOLLOWING:
100.4/Bleeding that does not stop/Wound/Surgical Site with redness, or foul smelling discharge or pus/Numbness, tingling, color or temperature change to extremity

## 2025-03-17 NOTE — ASU DISCHARGE PLAN (ADULT/PEDIATRIC) - CARE PROVIDER_API CALL
Roberto Gomez  Cardiology  [address], [address]  [place] [address]  Phone: [phone]  Fax: [phone]  Established Patient  Follow Up Time: 2 weeks

## 2025-03-17 NOTE — H&P CARDIOLOGY - TIME:
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  Mod Rt deficit-resolved. Lt sided one les Sx also resolved , No residual weakness    Depression     Diabetes with neurologic complications     Diastolic dysfunction     Stress echo 3/17/2014; Stress 6/10/2015-Resting LV function is normal.     Encounter for blood transfusion     post cardiac surg.     General anesthetics causing adverse effect in therapeutic use     difficult to wake up    Hearing loss, functional     History of colon polyps 11/3/2014    Hyperlipidemia     Hypertension     Irritable bowel syndrome     NSTEMI (non-ST elevated myocardial infarction) 1/23/2016    PT DENIES    ANDREW on CPAP     Osteoarthritis     back, hands, knee    Peripheral vascular disease 2/5/2016    calcified arteries    Pneumonia of both lungs due to infectious organism 1/23/2016    Polyneuropathy     PONV (postoperative nausea and vomiting)     Primary insomnia 4/26/2018    Refractive error     Renal manifestation of secondary diabetes mellitus     Renal oncocytoma of left kidney 2015    Rotator cuff (capsule) sprain and strain 1/17/2014    Sternoclavicular (joint) (ligament) sprain 1/17/2014    Tobacco dependence     resolved    Type 2 diabetes with peripheral circulatory disorder, controlled     Vitamin D deficiency 3/10/2014       Past Surgical History:   Procedure Laterality Date    ANKLE SURGERY  2008    removal bone spurs    APPENDECTOMY  1970 approx     AUGMENTATION OF BREAST      axillary lipoma removal Right     BREAST BIOPSY Right 2007    BREAST RECONSTRUCTION Right 11/13/2020    Procedure: RECONSTRUCTION, BREAST;  Surgeon: Archana Mosley MD;  Location: [facility] OR;  Service: General;  Laterality: Right;    CARDIAC CATHETERIZATION      CARDIAC VALVE SURGERY  04/04/2017    mitral valve    CATHETERIZATION OF BOTH LEFT AND RIGHT HEART N/A 6/17/2021    Procedure: CATHETERIZATION, HEART, BOTH LEFT AND RIGHT;  Surgeon: Karson Romo MD;  Location: [facility] CATH LAB;  Service: Cardiology;  Laterality: N/A;  COVID-19, MRNA, LN-S, PF (Pfizer) 4/16/2021, 3/26/2021    CHOLECYSTECTOMY  1976 approx    COLONOSCOPY N/A 7/20/2017    Procedure: COLONOSCOPY;  Surgeon: Hernando Calderon MD;  Location: [facility] ENDO;  Service: Endoscopy;  Laterality: N/A;    CORONARY ANGIOGRAPHY N/A 6/17/2021    Procedure: ANGIOGRAM, CORONARY ARTERY;  Surgeon: Karson Romo MD;  Location: [facility] CATH LAB;  Service: Cardiology;  Laterality: N/A;    ECHOCARDIOGRAM,TRANSESOPHAGEAL N/A 5/8/2023    Procedure: Transesophageal echo (ELEUTERIO) intra-procedure log documentation;  Surgeon: Preston Trent MD;  Location: [facility] CATH LAB;  Service: Cardiology;  Laterality: N/A;    FAT GRAFTING, OTHER N/A 3/15/2021    Procedure: INJECTION, FAT GRAFT;  Surgeon: Archana Mosley MD;  Location: [facility] OR;  Service: General;  Laterality: N/A;  Fat graft    HYSTERECTOMY  1990s    INSERTION OF BREAST TISSUE EXPANDER Right 11/13/2020    Procedure: INSERTION, TISSUE EXPANDER, BREAST;  Surgeon: Archana Mosley MD;  Location: [facility] OR;  Service: General;  Laterality: Right;    INSERTION OF INTRAMEDULLARY MARINE Right 2/4/2023    Procedure: INSERTION, INTRAMEDULLARY MARINE;  Surgeon: Gavin Blackwell MD;  Location: [facility] OR;  Service: Orthopedics;  Laterality: Right;    LOOP RECORDER      MASTECTOMY Right 2020    MASTECTOMY WITH SENTINEL NODE BIOPSY AND AXILLARY LYMPH NODE DISSECTION Right 11/13/2020    Procedure:  MASTECTOMY, WITH SENTINEL NODE BIOPSY AND AXILLARY LYMPHADENECTOMY;  Surgeon: Valerie Gonsales MD;  Location: [facility] OR;  Service: General;  Laterality: Right;    MASTOPEXY Left 3/15/2021    Procedure: MASTOPEXY;  Surgeon: Archana Mosley MD;  Location: [facility] OR;  Service: General;  Laterality: Left;    NEPHRECTOMY Left 12/01/2015    Dr. Robertson for oncocytoma    PLACEMENT OF ACELLULAR HUMAN DERMAL ALLOGRAFT Right 11/13/2020    Procedure: APPLICATION, ACELLULAR HUMAN DERMAL ALLOGRAFT;  Surgeon: Archana Mosley MD;  Location: [facility] OR;  Service: General;  Laterality: Right;  Alloderm application    REPLACEMENT OF IMPLANT OF BREAST Right 3/15/2021    Procedure: REPLACEMENT, IMPLANT, BREAST;  Surgeon: Archana Mosley MD;  Location: [facility] OR;  Service: General;  Laterality: Right;    RIGHT HEART CATHETERIZATION Right 6/17/2021    Procedure: INSERTION, CATHETER, RIGHT HEART;  Surgeon: Karson Romo MD;  Location: [facility] CATH LAB;  Service: Cardiology;  Laterality: Right;    SHOULDER SURGERY Bilateral 2004    bilateral shoulders    TONSILLECTOMY  1956    TOTAL REDUCTION MAMMOPLASTY Left 2020    TRANSESOPHAGEAL ECHOCARDIOGRAPHY N/A 1/24/2023    Procedure: ECHOCARDIOGRAM, TRANSESOPHAGEAL;  Surgeon: Randy De La Torre MD;  Location: [facility] CATH LAB;  Service: Cardiology;  Laterality: N/A;    TRANSFORAMINAL EPIDURAL INJECTION OF STEROID Right 9/29/2022    Procedure: Right L2/L3 and L3/L4 TF WILBER;  Surgeon: Sushil Villarreal MD;  Location: [facility] PAIN MGT;  Service: Pain Management;  Laterality: Right;    TRIGGER FINGER RELEASE Right 2008    Thumb       Review of patient's allergies indicates:   Allergen Reactions    Simvastatin Shortness Of Breath and Other (See Comments)     Difficulty breathing    Adhesive Rash    Ibuprofen Rash    Nickel Rash     Contact allergy    Sulfa (sulfonamide antibiotics) Nausea And Vomiting and Other (See Comments)     Vomiting       No current facility-administered medications on file prior  to encounter.     Current Outpatient Medications on File Prior to Encounter   Medication Sig    anastrozole (ARIMIDEX) 1 mg Tab Take 1 tablet (1 mg total) by mouth once daily.    aspirin (ECOTRIN) 81 MG EC tablet Take 81 mg by mouth once daily.    cholecalciferol, vitamin D3, 125 mcg (5,000 unit) Tab Take 1 tablet (5,000 Units total) by mouth once daily.    fluticasone propionate (FLONASE) 50 mcg/actuation nasal spray USE 2 SPRAYS IN EACH NOSTRIL ONE TIME DAILY    furosemide (LASIX) 40 MG tablet Take 1 tablet (40 mg total) by mouth once daily.    lovastatin (MEVACOR) 20 MG tablet Take 1 tablet (20 mg total) by mouth every evening.    magnesium oxide (MAG-OX) 400 mg (241.3 mg magnesium) tablet Take 1 tablet (400 mg total) by mouth 2 (two) times daily.    metoprolol succinate (TOPROL-XL) 25 MG 24 hr tablet Take 1 tablet (25 mg total) by mouth 2 (two) times daily.    omeprazole (PRILOSEC) 40 MG capsule Take 40 mg by mouth once daily.    sacubitriL-valsartan (ENTRESTO) 24-26 mg per tablet Take 1 tablet by mouth 2 (two) times daily.    calcium carbonate (TUMS) 200 mg calcium (500 mg) chewable tablet Take 2 tablets (1,000 mg total) by mouth 2 (two) times daily.    [DISCONTINUED] citalopram (CELEXA) 10 MG tablet Take 1 tablet (10 mg total) by mouth once daily. TAKE 1 TABLET ONE TIME DAILY    [DISCONTINUED] lancets (ACCU-CHEK SOFTCLIX LANCETS) Misc Dispense what is covered by insurance    [DISCONTINUED] omeprazole (PRILOSEC) 20 MG capsule Take 2 capsules (40 mg total) by mouth once daily.     Family History       Problem Relation (Age of Onset)    Alzheimer's disease Mother, Maternal Uncle, Paternal Uncle    Cancer Father, Paternal Uncle, Brother    Colon cancer Maternal Grandmother, Paternal Uncle    Diabetes Paternal Grandmother    HIV Brother    Heart disease Father    Hypertension Son          Tobacco Use    Smoking status: Former     Packs/day: 1.50     Years: 22.00     Pack years: 33.00     Types: Cigarettes     Quit  date: 3/10/1987     Years since quittin.3    Smokeless tobacco: Never   Substance and Sexual Activity    Alcohol use: Yes     Alcohol/week: 0.0 standard drinks     Comment: occasional: hold 72hrs prior to surgery    Drug use: No    Sexual activity: Never     Review of Systems   Constitutional: Positive for malaise/fatigue.   HENT: Negative.     Cardiovascular:  Positive for dyspnea on exertion.   Respiratory:  Positive for shortness of breath.    Skin:         Redness/erythema to trunk   Gastrointestinal: Negative.    Genitourinary: Negative.    Neurological:  Positive for weakness.   Psychiatric/Behavioral:          Confusion     Allergic/Immunologic: Negative.    Objective:     Vital Signs (Most Recent):  Temp: 99.3 °F (37.4 °C) (23 1529)  Pulse: (!) 121 (23 1529)  Resp: (!) 36 (23 1529)  BP: (!) 115/57 (23 1430)  SpO2: 97 % (23 1529) Vital Signs (24h Range):  Temp:  [99 °F (37.2 °C)-102.8 °F (39.3 °C)] 99.3 °F (37.4 °C)  Pulse:  [121-146] 121  Resp:  [22-38] 36  SpO2:  [93 %-97 %] 97 %  BP: (115-165)/(57-90) 115/57     Weight: 84.2 kg (185 lb 10 oz)  Body mass index is 29.96 kg/m².    SpO2: 97 %         Intake/Output Summary (Last 24 hours) at 2023 1559  Last data filed at 2023 1505  Gross per 24 hour   Intake 1652.82 ml   Output 400 ml   Net 1252.82 ml       Lines/Drains/Airways       Drain  Duration                  Urethral Catheter 23 0935 Non-latex 16 Fr. <1 day              Peripheral Intravenous Line  Duration                  Peripheral IV - Single Lumen 23 0859 18 G Left;Lateral Antecubital <1 day                     Physical Exam  Vitals and nursing note reviewed.   Constitutional:       General: She is not in acute distress.     Appearance: She is well-developed. She is ill-appearing. She is not diaphoretic.   HENT:      Head: Normocephalic and atraumatic.   Eyes:      General:         Right eye: No discharge.         Left eye: No discharge.       Pupils: Pupils are equal, round, and reactive to light.   Neck:      Thyroid: No thyromegaly.      Vascular: No JVD.      Trachea: No tracheal deviation.   Cardiovascular:      Rate and Rhythm: Regular rhythm. Tachycardia present.      Heart sounds: Normal heart sounds, S1 normal and S2 normal. No murmur heard.  Pulmonary:      Effort: Pulmonary effort is normal. No respiratory distress.      Breath sounds: Rales present. No wheezing.   Abdominal:      General: There is no distension.      Tenderness: There is no rebound.   Musculoskeletal:      Cervical back: Neck supple.   Skin:     General: Skin is warm and dry.      Findings: No erythema.   Neurological:      Mental Status: She is alert.      Comments: Mildly confused   Psychiatric:         Mood and Affect: Mood normal.         Behavior: Behavior normal.        Significant Labs: CMP   Recent Labs   Lab 06/23/23  0844   *   K 5.9*      CO2 19*   *   BUN 28*   CREATININE 1.5*   CALCIUM 9.8   PROT 8.1   ALBUMIN 3.8   BILITOT 1.1*   ALKPHOS 82   AST 47*   ALT 22   ANIONGAP 11   , CBC   Recent Labs   Lab 06/23/23  0844   WBC 18.27*   HGB 10.7*   HCT 34.8*      , Troponin   Recent Labs   Lab 06/23/23  0844   TROPONINI 0.015   , and All pertinent lab results from the last 24 hours have been reviewed.    Significant Imaging: Echocardiogram: Transthoracic echo (TTE) complete (Cupid Only):   Results for orders placed or performed during the hospital encounter of 05/02/23   Echo   Result Value Ref Range    BSA 1.94 m2    TDI SEPTAL 0.05 m/s    LV LATERAL E/E' RATIO 21.57 m/s    LV SEPTAL E/E' RATIO 30.20 m/s    LA WIDTH 3.70 cm    Left Ventricular Outflow Tract Mean Velocity 0.72 cm/s    Left Ventricular Outflow Tract Mean Gradient 2.12 mmHg    TV mean gradient 32 mmHg    TDI LATERAL 0.07 m/s    LVIDd 4.19 3.5 - 6.0 cm    IVS 1.36 (A) 0.6 - 1.1 cm    Posterior Wall 1.37 (A) 0.6 - 1.1 cm    Ao root annulus 3.35 cm    LVIDs 3.78 2.1 - 4.0 cm    FS  10 28 - 44 %    STJ 2.85 cm    Ascending aorta 2.76 cm    LV mass 215.10 g    LA size 4.05 cm    RVDD 3.68 cm    TAPSE 1.30 cm    Left Ventricle Relative Wall Thickness 0.65 cm    AV mean gradient 5 mmHg    AV valve area 1.90 cm2    AV Velocity Ratio 0.65     AV index (prosthetic) 0.64     MV mean gradient 5 mmHg    MV valve area p 1/2 method 3.37 cm2    MV valve area by continuity eq 1.39 cm2    E/A ratio 2.22     Mean e' 0.06 m/s    E wave deceleration time 224.78 msec    IVRT 45.67 msec    LVOT diameter 1.95 cm    LVOT area 3.0 cm2    LVOT peak mu 0.84 m/s    LVOT peak VTI 12.40 cm    Ao peak mu 1.29 m/s    Ao VTI 19.5 cm    LVOT stroke volume 37.01 cm3    AV peak gradient 7 mmHg    MV peak gradient 9 mmHg    E/E' ratio 25.17 m/s    MV Peak E Mu 1.51 m/s    TR Max Mu 3.46 m/s    MV VTI 26.7 cm    MV stenosis pressure 1/2 time 65.19 ms    MV Peak A Mu 0.68 m/s    LV Systolic Volume 61.35 mL    LV Systolic Volume Index 32.1 mL/m2    LV Diastolic Volume 78.33 mL    LV Diastolic Volume Index 41.01 mL/m2    LV Mass Index 113 g/m2    RA Major Axis 5.15 cm    Left Atrium Major Axis 4.68 cm    Triscuspid Valve Regurgitation Peak Gradient 48 mmHg    RA Width 4.40 cm    EF 20 %    Narrative    · The left ventricle is moderately enlarged with concentric hypertrophy   and severely decreased systolic function.  · The estimated ejection fraction is 20%.  · Grade III left ventricular diastolic dysfunction.  · There is severe left ventricular global hypokinesis.  · Normal right ventricular size with normal right ventricular systolic   function.  · Mild left atrial enlargement.  · Mild right atrial enlargement.  · There is mild aortic valve stenosis.  · Aortic valve area is 1.90 cm2; peak velocity is 1.29 m/s; mean gradient   is 5 mmHg.  · There is a bioprosthetic mitral valve. There is no insufficiency   present. Prosthetic mitral valve is normal.  · Moderate tricuspid regurgitation.      , EKG: Reviewed, and X-Ray: CXR:  X-Ray Chest 1 View (CXR): No results found for this visit on 06/23/23. and X-Ray Chest PA and Lateral (CXR): No results found for this visit on 06/23/23.   10:04

## 2025-03-17 NOTE — H&P CARDIOLOGY - HISTORY OF PRESENT ILLNESS
This is a 84yr old male with PMH of CAD s/p PCI DESx 1 to LCx on 8/29 ( ASA/Plavix), HFrEF (echo 9/11/24 with EF 55%), HTN, HLD, BPH, osteoarthritis right hip s/p replacement 02/2024. Seen and evaluated by Dr Gomez for c/c of increased fatigue for the past few  weeks associated with dizziness and dyspnea on exertion at times. Denies chest pain, palpitations, diaphoresis, vision changes, HA, syncope, near syncope. cough, wheezing, edema, fever, chills, infection.  Given risk factors and symptomatology, pt referred here today for [facility] for further evaluation of CAD.     < from: CT Angio Abdomen and Pelvis w/ IV Cont (08.27.24 @ 19:42) >      INTERPRETATION:  INDICATION: Chest pain radiating to the neck. Evaluate   for aortic dissection.    TECHNIQUE: CT angiogram of the chest was obtained after the intravenous   administration of 90 cc administered mL of Omnipaque 350 10 cc discarded   discarded. Three-dimensional maximum intensity projection images were   generated.    COMPARISON: CT abdomen pelvis 7/21/2024, CT chest 7/20/2023    FINDINGS:  Aorta angiogram: No aortic dissection, aortic aneurysm, or intramural   hematoma. Aortic and coronary artery calcifications.  Lymph nodes: No lymphadenopathy.  Heart/vasculature: Heart size is normal. Coronary artery calcification.   No pericardial effusion.  Airways/lungs/pleura: Patent central airways. Unchanged 3 mm right upper   lobe nodule (302-64). No pleural effusion or pneumothorax.    LIVER: Within normal limits.  BILE DUCTS: Normal caliber.  GALLBLADDER: Within normal limits.  SPLEEN: Several hypodense foci are grossly unchanged.  PANCREAS: Within normal limits.  ADRENALS: Unchanged thickening of the left adrenal gland.  KIDNEYS/URETERS: Within normal limits.    BLADDER: Within normal limits.  REPRODUCTIVE ORGANS: Enlarged prostate.    BOWEL: No bowel obstruction. Sigmoid anastomosis. Colonic diverticulosis   without diverticulitis.  PERITONEUM/RETROPERITONEUM: Within normal limits.  VESSELS: No aortic aneurysm or dissection. Left-sided IVC.  LYMPH NODES: No lymphadenopathy.  ABDOMINAL WALL: Within normal limits.  BONES: Degenerative changes. Right total hip arthroplasty.    IMPRESSION:  No aortic dissection, aneurysm, or intramural hematoma.    < end of copied text >  < from: TTE W or WO Ultrasound Enhancing Agent (08.28.24 @ 10:56) >  CONCLUSIONS:      1. Left ventricular cavity is normal in size. Left ventricular wall thickness is normal. Left ventricular systolic function is mildly decreased with an ejection fraction visually estimated at 45 to 50 %. Regional wall motion abnormalities present.   2. Basal and mid anterolateral wall and basal and mid inferolateral wall are abnormal.   3. Normal right ventricular cavity size and normal right ventricular systolic function.   4. Estimated pulmonary artery systolic pressure is 30 mmHg.   5. No pericardial effusion seen.   6. Compared to the transthoracic echocardiogram performed on 2/23/2024, there is a new wall motion abnormality in the left circumflex artery territory.    < end of copied text >      < from: Cardiac Catheterization (08.30.24 @ 18:08) >    Coronary Angiography   Left Coronary System:   A catheter was positioned into the vessel ostia under fluoroscopic  guidance. Angiograms were obtained in multiple views.    Diagnostic Findings:     Coronary Angiography   LM   Left main artery: Angiography shows minor luminal irregularities.      LAD   Left anterior descending artery: Patent stents. .      CX   Circumflex: Patent stent. .      < end of copied text >

## 2025-03-17 NOTE — H&P CARDIOLOGY - NSICDXPASTSURGICALHX_GEN_ALL_CORE_FT
PAST SURGICAL HISTORY:  H/O heart artery stent     H/O heart artery stent     History of appendectomy     History of partial colectomy     History of tonsillectomy

## 2025-03-17 NOTE — ASU PATIENT PROFILE, ADULT - FALL HARM RISK - UNIVERSAL INTERVENTIONS
Bed in lowest position, wheels locked, appropriate side rails in place/Call bell, personal items and telephone in reach/Instruct patient to call for assistance before getting out of bed or chair/Non-slip footwear when patient is out of bed/[place] to call system/Physically safe environment - no spills, clutter or unnecessary equipment/Purposeful Proactive Rounding/Room/bathroom lighting operational, light cord in reach

## 2025-03-17 NOTE — ASU DISCHARGE PLAN (ADULT/PEDIATRIC) - FINANCIAL ASSISTANCE
[facility] provides services at a reduced cost to those who are determined to be eligible through [facility]’s financial assistance program. Information regarding [facility]’s financial assistance program can be found by going to [email or web address] or by calling [phone].

## 2025-03-17 NOTE — ASU PREOP CHECKLIST - HEIGHT IN CM
[facility] contacted office. They are not accepting this insurance plan. New referral needed. Family notified. Another audiologist is located in [place]. Zaid Schneider verbalized understanding and is receptive to the plan. She is to call our office as needed.     [address] [facility], [facility] [address], [address] [place] [address] Â P [phone] Â F [phone]

## 2025-03-17 NOTE — ASU DISCHARGE PLAN (ADULT/PEDIATRIC) - ASU DC SPECIAL INSTRUCTIONSFT
see printed instructions  We have provided you with a prescription for cardiac rehab which is medically supervised exercise program for your heart and has been shown to improve the quantity and quality of life of people with heart disease like yours. You should attend cardiac rehab 3 times per week for 12 weeks. We have provided you with a list of nearby facilities. Please call your insurance carrier to determine which of these facilities are covered under your plan. Please bring this prescription with you to your follow up appointment with your cardiologist who can then further assist you to enroll into a cardiac rehab program.

## 2025-03-18 LAB
ALBUMIN SERPL ELPH-MCNC: 4.5 G/DL
ALP BLD-CCNC: 115 IU/L
ALP BLD-CCNC: 123 U/L
ALP BONE CFR SERPL: 42 %
ALP INTEST CFR SERPL: 8 %
ALP LIVER CFR SERPL: 50 %
ALT SERPL-CCNC: 21 U/L
AST SERPL-CCNC: 22 U/L
BILIRUB DIRECT SERPL-MCNC: 0.2 MG/DL
BILIRUB INDIRECT SERPL-MCNC: 0.4 MG/DL
BILIRUB SERPL-MCNC: 0.6 MG/DL
GGT SERPL-CCNC: 29 U/L
PROT SERPL-MCNC: 6.8 G/DL

## 2025-03-24 ENCOUNTER — APPOINTMENT (OUTPATIENT)
Dept: CARDIOLOGY | Facility: CLINIC | Age: 85
End: 2025-03-24
Payer: MEDICARE

## 2025-03-24 ENCOUNTER — APPOINTMENT (OUTPATIENT)
Dept: CARDIOLOGY | Facility: CLINIC | Age: 85
End: 2025-03-24

## 2025-03-24 VITALS
DIASTOLIC BLOOD PRESSURE: 60 MMHG | WEIGHT: 151 LBS | SYSTOLIC BLOOD PRESSURE: 138 MMHG | BODY MASS INDEX: 22.88 KG/M2 | OXYGEN SATURATION: 98 % | TEMPERATURE: 97.6 F | HEIGHT: 68 IN | HEART RATE: 52 BPM

## 2025-03-24 DIAGNOSIS — M54.50 LOW BACK PAIN, UNSPECIFIED: ICD-10-CM

## 2025-03-24 DIAGNOSIS — N39.0 URINARY TRACT INFECTION, SITE NOT SPECIFIED: ICD-10-CM

## 2025-03-24 DIAGNOSIS — R20.0 ANESTHESIA OF SKIN: ICD-10-CM

## 2025-03-24 DIAGNOSIS — I25.10 ATHEROSCLEROTIC HEART DISEASE OF NATIVE CORONARY ARTERY W/OUT ANGINA PECTORIS: ICD-10-CM

## 2025-03-24 DIAGNOSIS — R91.8 OTHER NONSPECIFIC ABNORMAL FINDING OF LUNG FIELD: ICD-10-CM

## 2025-03-24 DIAGNOSIS — R53.83 OTHER FATIGUE: ICD-10-CM

## 2025-03-24 DIAGNOSIS — R06.09 OTHER FORMS OF DYSPNEA: ICD-10-CM

## 2025-03-24 DIAGNOSIS — I10 ESSENTIAL (PRIMARY) HYPERTENSION: ICD-10-CM

## 2025-03-24 DIAGNOSIS — R42 DIZZINESS AND GIDDINESS: ICD-10-CM

## 2025-03-24 DIAGNOSIS — E78.00 PURE HYPERCHOLESTEROLEMIA, UNSPECIFIED: ICD-10-CM

## 2025-03-24 DIAGNOSIS — I50.20 UNSPECIFIED SYSTOLIC (CONGESTIVE) HEART FAILURE: ICD-10-CM

## 2025-03-24 DIAGNOSIS — M79.89 OTHER SPECIFIED SOFT TISSUE DISORDERS: ICD-10-CM

## 2025-03-24 DIAGNOSIS — I77.810 THORACIC AORTIC ECTASIA: ICD-10-CM

## 2025-03-24 PROCEDURE — 93244 EXT ECG>48HR<7D REV&INTERPJ: CPT

## 2025-03-24 PROCEDURE — G2211 COMPLEX E/M VISIT ADD ON: CPT

## 2025-03-24 PROCEDURE — 93000 ELECTROCARDIOGRAM COMPLETE: CPT

## 2025-03-24 PROCEDURE — 99214 OFFICE O/P EST MOD 30 MIN: CPT

## 2025-03-24 RX ORDER — AMOXICILLIN AND CLAVULANATE POTASSIUM 875; 125 MG/1; MG/1
875-125 TABLET, COATED ORAL
Qty: 20 | Refills: 0 | Status: ACTIVE | COMMUNITY
Start: 2025-03-24 | End: 1900-01-01

## 2025-03-24 RX ORDER — METHYLPREDNISOLONE 4 MG/1
4 TABLET ORAL
Qty: 1 | Refills: 1 | Status: ACTIVE | COMMUNITY
Start: 2025-03-24 | End: 1900-01-01

## 2025-03-25 LAB
ANION GAP SERPL CALC-SCNC: 12 MMOL/L
BASOPHILS # BLD AUTO: 0.06 K/UL
BASOPHILS NFR BLD AUTO: 0.9 %
BUN SERPL-MCNC: 21 MG/DL
CALCIUM SERPL-MCNC: 9.5 MG/DL
CHLORIDE SERPL-SCNC: 100 MMOL/L
CO2 SERPL-SCNC: 24 MMOL/L
CREAT SERPL-MCNC: 1.01 MG/DL
EGFRCR SERPLBLD CKD-EPI 2021: 73 ML/MIN/1.73M2
EOSINOPHIL # BLD AUTO: 0.28 K/UL
EOSINOPHIL NFR BLD AUTO: 4.2 %
GLUCOSE SERPL-MCNC: 96 MG/DL
HCT VFR BLD CALC: 42.6 %
HGB BLD-MCNC: 14.1 G/DL
IMM GRANULOCYTES NFR BLD AUTO: 0.3 %
LYMPHOCYTES # BLD AUTO: 1.67 K/UL
LYMPHOCYTES NFR BLD AUTO: 25.1 %
MAN DIFF?: NORMAL
MCHC RBC-ENTMCNC: 33.1 G/DL
MCHC RBC-ENTMCNC: 33.1 PG
MCV RBC AUTO: 100 FL
MONOCYTES # BLD AUTO: 0.73 K/UL
MONOCYTES NFR BLD AUTO: 11 %
NEUTROPHILS # BLD AUTO: 3.89 K/UL
NEUTROPHILS NFR BLD AUTO: 58.5 %
PLATELET # BLD AUTO: 201 K/UL
POTASSIUM SERPL-SCNC: 4.8 MMOL/L
RBC # BLD: 4.26 M/UL
RBC # FLD: 13.6 %
SODIUM SERPL-SCNC: 136 MMOL/L
WBC # FLD AUTO: 6.65 K/UL

## 2025-03-28 ENCOUNTER — APPOINTMENT (OUTPATIENT)
Dept: OTOLARYNGOLOGY | Facility: CLINIC | Age: 85
End: 2025-03-28
Payer: MEDICARE

## 2025-03-28 VITALS — SYSTOLIC BLOOD PRESSURE: 140 MMHG | DIASTOLIC BLOOD PRESSURE: 54 MMHG | HEART RATE: 45 BPM | TEMPERATURE: 98 F

## 2025-03-28 DIAGNOSIS — R09.82 POSTNASAL DRIP: ICD-10-CM

## 2025-03-28 DIAGNOSIS — H57.9 UNSPECIFIED DISORDER OF EYE AND ADNEXA: ICD-10-CM

## 2025-03-28 DIAGNOSIS — H61.21 IMPACTED CERUMEN, RIGHT EAR: ICD-10-CM

## 2025-03-28 DIAGNOSIS — R09.81 NASAL CONGESTION: ICD-10-CM

## 2025-03-28 PROCEDURE — 31231 NASAL ENDOSCOPY DX: CPT

## 2025-03-28 PROCEDURE — 69210 REMOVE IMPACTED EAR WAX UNI: CPT | Mod: RT

## 2025-03-28 PROCEDURE — 99213 OFFICE O/P EST LOW 20 MIN: CPT | Mod: 25

## 2025-03-28 RX ORDER — FLUOCINONIDE 0.5 MG/G
0.05 CREAM TOPICAL
Qty: 60 | Refills: 0 | Status: ACTIVE | COMMUNITY
Start: 2025-02-14

## 2025-03-28 RX ORDER — MOMETASONE FUROATE 100 %
POWDER (GRAM) MISCELLANEOUS
Qty: 60 | Refills: 3 | Status: ACTIVE | COMMUNITY
Start: 2025-03-28 | End: 1900-01-01

## 2025-04-08 ENCOUNTER — APPOINTMENT (OUTPATIENT)
Dept: INTERNAL MEDICINE | Facility: CLINIC | Age: 85
End: 2025-04-08

## 2025-05-07 ENCOUNTER — APPOINTMENT (OUTPATIENT)
Dept: CARDIOLOGY | Facility: CLINIC | Age: 85
End: 2025-05-07

## 2025-06-09 ENCOUNTER — RX RENEWAL (OUTPATIENT)
Age: 85
End: 2025-06-09

## 2025-06-17 ENCOUNTER — LABORATORY RESULT (OUTPATIENT)
Age: 85
End: 2025-06-17

## 2025-06-17 ENCOUNTER — APPOINTMENT (OUTPATIENT)
Dept: INTERNAL MEDICINE | Facility: CLINIC | Age: 85
End: 2025-06-17
Payer: MEDICARE

## 2025-06-17 VITALS
HEIGHT: 68 IN | BODY MASS INDEX: 21.67 KG/M2 | SYSTOLIC BLOOD PRESSURE: 162 MMHG | WEIGHT: 143 LBS | HEART RATE: 48 BPM | OXYGEN SATURATION: 99 % | DIASTOLIC BLOOD PRESSURE: 68 MMHG

## 2025-06-17 PROCEDURE — 36415 COLL VENOUS BLD VENIPUNCTURE: CPT

## 2025-06-17 PROCEDURE — 99215 OFFICE O/P EST HI 40 MIN: CPT

## 2025-06-17 PROCEDURE — G2211 COMPLEX E/M VISIT ADD ON: CPT

## 2025-06-17 PROCEDURE — 93000 ELECTROCARDIOGRAM COMPLETE: CPT

## 2025-06-18 LAB
ALBUMIN SERPL ELPH-MCNC: 4.8 G/DL
ALP BLD-CCNC: 153 U/L
ALT SERPL-CCNC: 32 U/L
ANION GAP SERPL CALC-SCNC: 15 MMOL/L
APPEARANCE: CLEAR
AST SERPL-CCNC: 30 U/L
BACTERIA: NEGATIVE /HPF
BASOPHILS # BLD AUTO: 0.05 K/UL
BASOPHILS NFR BLD AUTO: 0.7 %
BILIRUB SERPL-MCNC: 0.6 MG/DL
BILIRUBIN URINE: NEGATIVE
BLOOD URINE: NEGATIVE
BUN SERPL-MCNC: 18 MG/DL
CALCIUM SERPL-MCNC: 10.8 MG/DL
CAST: 1 /LPF
CELIAC PANEL: NORMAL
CHLORIDE SERPL-SCNC: 99 MMOL/L
CHOLEST SERPL-MCNC: 123 MG/DL
CO2 SERPL-SCNC: 23 MMOL/L
COLOR: YELLOW
CREAT SERPL-MCNC: 1.14 MG/DL
EGFRCR SERPLBLD CKD-EPI 2021: 63 ML/MIN/1.73M2
EOSINOPHIL # BLD AUTO: 0.24 K/UL
EOSINOPHIL NFR BLD AUTO: 3.6 %
EPITHELIAL CELLS: 0 /HPF
ESTIMATED AVERAGE GLUCOSE: 103 MG/DL
GLUCOSE QUALITATIVE U: NEGATIVE MG/DL
GLUCOSE SERPL-MCNC: 99 MG/DL
HBA1C MFR BLD HPLC: 5.2 %
HCT VFR BLD CALC: 45 %
HDLC SERPL-MCNC: 67 MG/DL
HGB BLD-MCNC: 15.3 G/DL
IMM GRANULOCYTES NFR BLD AUTO: 0.3 %
KETONES URINE: NEGATIVE MG/DL
LDLC SERPL-MCNC: 43 MG/DL
LEUKOCYTE ESTERASE URINE: ABNORMAL
LYMPHOCYTES # BLD AUTO: 1.22 K/UL
LYMPHOCYTES NFR BLD AUTO: 18.1 %
MAN DIFF?: NORMAL
MCHC RBC-ENTMCNC: 33.5 PG
MCHC RBC-ENTMCNC: 34 G/DL
MCV RBC AUTO: 98.5 FL
MICROSCOPIC-UA: NORMAL
MONOCYTES # BLD AUTO: 0.75 K/UL
MONOCYTES NFR BLD AUTO: 11.1 %
NEUTROPHILS # BLD AUTO: 4.45 K/UL
NEUTROPHILS NFR BLD AUTO: 66.2 %
NITRITE URINE: NEGATIVE
NONHDLC SERPL-MCNC: 57 MG/DL
NT-PROBNP SERPL-MCNC: 163 PG/ML
PH URINE: 7
PLATELET # BLD AUTO: 205 K/UL
POTASSIUM SERPL-SCNC: 5.1 MMOL/L
PROT SERPL-MCNC: 7.5 G/DL
PROTEIN URINE: NEGATIVE MG/DL
RBC # BLD: 4.57 M/UL
RBC # FLD: 13.4 %
RED BLOOD CELLS URINE: 1 /HPF
SODIUM SERPL-SCNC: 137 MMOL/L
SPECIFIC GRAVITY URINE: 1.01
T3FREE SERPL-MCNC: 2.86 PG/ML
T4 FREE SERPL-MCNC: 1.2 NG/DL
TRIGL SERPL-MCNC: 66 MG/DL
TSH SERPL-ACNC: 3.44 UIU/ML
UROBILINOGEN URINE: 0.2 MG/DL
WBC # FLD AUTO: 6.73 K/UL
WHITE BLOOD CELLS URINE: 0 /HPF

## 2025-06-19 LAB
BACTERIA UR CULT: NORMAL
CALCIUM SERPL-MCNC: 10.5 MG/DL
GGT SERPL-CCNC: 29 U/L
PARATHYROID HORMONE INTACT: 17 PG/ML

## 2025-06-20 LAB
M TB IFN-G BLD-IMP: NEGATIVE
QUANTIFERON TB PLUS MITOGEN MINUS NIL: 0.65 IU/ML
QUANTIFERON TB PLUS NIL: 0.02 IU/ML
QUANTIFERON TB PLUS TB1 MINUS NIL: 0 IU/ML
QUANTIFERON TB PLUS TB2 MINUS NIL: 0 IU/ML

## 2025-07-10 ENCOUNTER — APPOINTMENT (OUTPATIENT)
Dept: CARDIOLOGY | Facility: CLINIC | Age: 85
End: 2025-07-10

## 2025-07-21 ENCOUNTER — APPOINTMENT (OUTPATIENT)
Dept: OTOLARYNGOLOGY | Facility: CLINIC | Age: 85
End: 2025-07-21
Payer: MEDICARE

## 2025-07-21 VITALS
HEIGHT: 68 IN | BODY MASS INDEX: 21.67 KG/M2 | SYSTOLIC BLOOD PRESSURE: 158 MMHG | WEIGHT: 143 LBS | DIASTOLIC BLOOD PRESSURE: 86 MMHG | HEART RATE: 51 BPM

## 2025-07-21 DIAGNOSIS — Z87.438 PERSONAL HISTORY OF OTHER DISEASES OF MALE GENITAL ORGANS: ICD-10-CM

## 2025-07-21 DIAGNOSIS — R42 DIZZINESS AND GIDDINESS: ICD-10-CM

## 2025-07-21 DIAGNOSIS — Z86.79 PERSONAL HISTORY OF OTHER DISEASES OF THE CIRCULATORY SYSTEM: ICD-10-CM

## 2025-07-21 DIAGNOSIS — H90.5 UNSPECIFIED SENSORINEURAL HEARING LOSS: ICD-10-CM

## 2025-07-21 PROCEDURE — 92567 TYMPANOMETRY: CPT

## 2025-07-21 PROCEDURE — 99214 OFFICE O/P EST MOD 30 MIN: CPT

## 2025-07-21 PROCEDURE — 92557 COMPREHENSIVE HEARING TEST: CPT

## 2025-07-25 ENCOUNTER — APPOINTMENT (OUTPATIENT)
Dept: MRI IMAGING | Facility: CLINIC | Age: 85
End: 2025-07-25
Payer: MEDICARE

## 2025-07-25 ENCOUNTER — OUTPATIENT (OUTPATIENT)
Dept: OUTPATIENT SERVICES | Facility: HOSPITAL | Age: 85
LOS: 1 days | End: 2025-07-25
Payer: MEDICARE

## 2025-07-25 DIAGNOSIS — Z95.5 PRESENCE OF CORONARY ANGIOPLASTY IMPLANT AND GRAFT: Chronic | ICD-10-CM

## 2025-07-25 DIAGNOSIS — Z90.89 ACQUIRED ABSENCE OF OTHER ORGANS: Chronic | ICD-10-CM

## 2025-07-25 DIAGNOSIS — Z90.49 ACQUIRED ABSENCE OF OTHER SPECIFIED PARTS OF DIGESTIVE TRACT: Chronic | ICD-10-CM

## 2025-07-25 DIAGNOSIS — R42 DIZZINESS AND GIDDINESS: ICD-10-CM

## 2025-07-25 PROBLEM — Z86.79 H/O: HTN (HYPERTENSION): Status: RESOLVED | Noted: 2025-07-21 | Resolved: 2025-07-25

## 2025-07-25 PROBLEM — H90.5 COCHLEAR HEARING LOSS: Status: ACTIVE | Noted: 2025-07-25

## 2025-07-25 PROCEDURE — 72141 MRI NECK SPINE W/O DYE: CPT

## 2025-07-25 PROCEDURE — 72141 MRI NECK SPINE W/O DYE: CPT | Mod: 26

## 2025-07-25 PROCEDURE — 70551 MRI BRAIN STEM W/O DYE: CPT

## 2025-07-25 PROCEDURE — 70551 MRI BRAIN STEM W/O DYE: CPT | Mod: 26

## 2025-08-01 ENCOUNTER — NON-APPOINTMENT (OUTPATIENT)
Age: 85
End: 2025-08-01

## 2025-08-04 ENCOUNTER — APPOINTMENT (OUTPATIENT)
Dept: NEUROLOGY | Facility: CLINIC | Age: 85
End: 2025-08-04
Payer: MEDICARE

## 2025-08-04 VITALS
SYSTOLIC BLOOD PRESSURE: 152 MMHG | HEART RATE: 67 BPM | WEIGHT: 144 LBS | HEIGHT: 68 IN | BODY MASS INDEX: 21.82 KG/M2 | DIASTOLIC BLOOD PRESSURE: 84 MMHG

## 2025-08-04 DIAGNOSIS — R06.83 SNORING: ICD-10-CM

## 2025-08-04 DIAGNOSIS — R42 DIZZINESS AND GIDDINESS: ICD-10-CM

## 2025-08-04 DIAGNOSIS — G44.86 CERVICOGENIC HEADACHE: ICD-10-CM

## 2025-08-04 DIAGNOSIS — G25.0 ESSENTIAL TREMOR: ICD-10-CM

## 2025-08-04 PROCEDURE — G2211 COMPLEX E/M VISIT ADD ON: CPT

## 2025-08-04 PROCEDURE — 99205 OFFICE O/P NEW HI 60 MIN: CPT

## 2025-08-05 ENCOUNTER — APPOINTMENT (OUTPATIENT)
Dept: PULMONOLOGY | Facility: CLINIC | Age: 85
End: 2025-08-05
Payer: MEDICARE

## 2025-08-05 VITALS
RESPIRATION RATE: 16 BRPM | WEIGHT: 146 LBS | HEART RATE: 47 BPM | OXYGEN SATURATION: 97 % | HEIGHT: 68 IN | DIASTOLIC BLOOD PRESSURE: 71 MMHG | SYSTOLIC BLOOD PRESSURE: 139 MMHG | BODY MASS INDEX: 22.13 KG/M2

## 2025-08-05 DIAGNOSIS — R91.8 OTHER NONSPECIFIC ABNORMAL FINDING OF LUNG FIELD: ICD-10-CM

## 2025-08-05 PROCEDURE — G2211 COMPLEX E/M VISIT ADD ON: CPT

## 2025-08-05 PROCEDURE — 99213 OFFICE O/P EST LOW 20 MIN: CPT

## 2025-08-27 ENCOUNTER — APPOINTMENT (OUTPATIENT)
Dept: OTOLARYNGOLOGY | Facility: CLINIC | Age: 85
End: 2025-08-27

## 2025-08-27 PROCEDURE — 92540 BASIC VESTIBULAR EVALUATION: CPT

## 2025-08-27 PROCEDURE — 92537 CALORIC VSTBLR TEST W/REC: CPT

## 2025-08-27 PROCEDURE — 92567 TYMPANOMETRY: CPT

## 2025-08-27 PROCEDURE — 92547 SUPPLEMENTAL ELECTRICAL TEST: CPT

## 2025-09-11 ENCOUNTER — APPOINTMENT (OUTPATIENT)
Dept: INTERNAL MEDICINE | Facility: CLINIC | Age: 85
End: 2025-09-11
Payer: MEDICARE

## 2025-09-11 VITALS
OXYGEN SATURATION: 99 % | DIASTOLIC BLOOD PRESSURE: 73 MMHG | TEMPERATURE: 98.1 F | HEART RATE: 50 BPM | HEIGHT: 68 IN | SYSTOLIC BLOOD PRESSURE: 155 MMHG | WEIGHT: 144 LBS | BODY MASS INDEX: 21.82 KG/M2

## 2025-09-11 DIAGNOSIS — R10.9 UNSPECIFIED ABDOMINAL PAIN: ICD-10-CM

## 2025-09-11 PROCEDURE — 99214 OFFICE O/P EST MOD 30 MIN: CPT

## 2025-09-11 PROCEDURE — 36415 COLL VENOUS BLD VENIPUNCTURE: CPT

## 2025-09-12 ENCOUNTER — APPOINTMENT (OUTPATIENT)
Dept: CT IMAGING | Facility: HOSPITAL | Age: 85
End: 2025-09-12
Payer: MEDICARE

## 2025-09-12 LAB
ALBUMIN SERPL ELPH-MCNC: 4.4 G/DL
ALP BLD-CCNC: 113 U/L
ALT SERPL-CCNC: 33 U/L
AMYLASE/CREAT SERPL: 69 U/L
ANION GAP SERPL CALC-SCNC: 11 MMOL/L
AST SERPL-CCNC: 26 U/L
BASOPHILS # BLD AUTO: 0.04 K/UL
BASOPHILS NFR BLD AUTO: 0.5 %
BILIRUB SERPL-MCNC: 0.4 MG/DL
BUN SERPL-MCNC: 17 MG/DL
CALCIUM SERPL-MCNC: 9.6 MG/DL
CHLORIDE SERPL-SCNC: 102 MMOL/L
CO2 SERPL-SCNC: 25 MMOL/L
CREAT SERPL-MCNC: 1.17 MG/DL
EGFRCR SERPLBLD CKD-EPI 2021: 61 ML/MIN/1.73M2
EOSINOPHIL # BLD AUTO: 0.21 K/UL
EOSINOPHIL NFR BLD AUTO: 2.8 %
GLUCOSE SERPL-MCNC: 111 MG/DL
HCT VFR BLD CALC: 44.3 %
HGB BLD-MCNC: 14.8 G/DL
IMM GRANULOCYTES NFR BLD AUTO: 0.3 %
LPL SERPL-CCNC: 39 U/L
LYMPHOCYTES # BLD AUTO: 1.36 K/UL
LYMPHOCYTES NFR BLD AUTO: 17.9 %
MAN DIFF?: NORMAL
MCHC RBC-ENTMCNC: 32.8 PG
MCHC RBC-ENTMCNC: 33.4 G/DL
MCV RBC AUTO: 98.2 FL
MONOCYTES # BLD AUTO: 0.78 K/UL
MONOCYTES NFR BLD AUTO: 10.2 %
NEUTROPHILS # BLD AUTO: 5.2 K/UL
NEUTROPHILS NFR BLD AUTO: 68.3 %
PLATELET # BLD AUTO: 192 K/UL
POTASSIUM SERPL-SCNC: 4.9 MMOL/L
PROT SERPL-MCNC: 6.8 G/DL
RBC # BLD: 4.51 M/UL
RBC # FLD: 13.2 %
SODIUM SERPL-SCNC: 138 MMOL/L
WBC # FLD AUTO: 7.61 K/UL

## 2025-09-12 PROCEDURE — 74176 CT ABD & PELVIS W/O CONTRAST: CPT | Mod: 26

## 2025-09-20 ENCOUNTER — RX RENEWAL (OUTPATIENT)
Age: 85
End: 2025-09-20

## (undated) DEVICE — GLV 8.5 PROTEXIS (BLUE)

## (undated) DEVICE — GLV 8 PROTEXIS (CREAM) NEU-THERA

## (undated) DEVICE — DRSG AQUACEL 3.5 X 10"

## (undated) DEVICE — SOL IRR POUR H2O 1000ML

## (undated) DEVICE — SOL BETADINE POUCH 0.75OZ STERILE

## (undated) DEVICE — SUT QUILL MONODERM 0 1/2 CIRCLE TAPR 45CM 26MM

## (undated) DEVICE — DRAPE 3/4 SHEET W REINFORCEMENT 56X77"

## (undated) DEVICE — HOOD FLYTE STRYKER SURGICOOL W PEELAWAY

## (undated) DEVICE — DRAPE SUPINE ESYSUIT

## (undated) DEVICE — SUT PDO 2 1/2 CIRCLE 40MM NDL 45CM

## (undated) DEVICE — VENODYNE/SCD SLEEVE CALF LARGE

## (undated) DEVICE — SOL IRR POUR NS 0.9% 500ML

## (undated) DEVICE — SUT MONOCRYL 2-0 27" SH UNDYED

## (undated) DEVICE — ELCTR BOVIE PENCIL SMOKE EVACUATION

## (undated) DEVICE — SPECIMEN CONTAINER 100ML

## (undated) DEVICE — STRYKER INTERPULSE HANDPIECE W IRR SUCTION TUBE

## (undated) DEVICE — HOOD FLYTE STRYKER HELMET SHIELD

## (undated) DEVICE — SAW BLADE STRYKER DUAL CUT 1.27X11 X90MM

## (undated) DEVICE — DRAPE U 47X51" LF STERILE

## (undated) DEVICE — SUT QUILL MONODERM 2-0 3/8 CIRCLE 45CM

## (undated) DEVICE — SUT STRATAFIX SYMMETRIC PDS PLUS 1 18" CTX VIOLET

## (undated) DEVICE — GRIPPER MEDENVISION

## (undated) DEVICE — GLV 8.5 PROTEXIS (WHITE)

## (undated) DEVICE — SOL IRR BAG NS 0.9% 1000ML

## (undated) DEVICE — WARMING BLANKET UPPER ADULT

## (undated) DEVICE — DRSG DERMABOND 0.7ML

## (undated) DEVICE — DRAPE 1/2 SHEET 40X57"

## (undated) DEVICE — GLV 7.5 PROTEXIS (WHITE)

## (undated) DEVICE — TUBING TUR 2 PRONG

## (undated) DEVICE — GLV 6.5 PROTEXIS (WHITE)

## (undated) DEVICE — LIGHT PIPE

## (undated) DEVICE — GLV 7 PROTEXIS (WHITE)